# Patient Record
Sex: MALE | Race: WHITE | Employment: OTHER | URBAN - METROPOLITAN AREA
[De-identification: names, ages, dates, MRNs, and addresses within clinical notes are randomized per-mention and may not be internally consistent; named-entity substitution may affect disease eponyms.]

---

## 2022-09-15 ENCOUNTER — OFFICE VISIT (OUTPATIENT)
Dept: ORTHOPEDIC SURGERY | Age: 87
End: 2022-09-15
Payer: MEDICARE

## 2022-09-15 VITALS — HEIGHT: 68 IN | WEIGHT: 182 LBS | BODY MASS INDEX: 27.58 KG/M2

## 2022-09-15 DIAGNOSIS — M25.561 RIGHT KNEE PAIN, UNSPECIFIED CHRONICITY: Primary | ICD-10-CM

## 2022-09-15 DIAGNOSIS — M25.551 RIGHT HIP PAIN: ICD-10-CM

## 2022-09-15 PROCEDURE — 99204 OFFICE O/P NEW MOD 45 MIN: CPT | Performed by: ORTHOPAEDIC SURGERY

## 2022-09-15 PROCEDURE — G8428 CUR MEDS NOT DOCUMENT: HCPCS | Performed by: ORTHOPAEDIC SURGERY

## 2022-09-15 PROCEDURE — G8417 CALC BMI ABV UP PARAM F/U: HCPCS | Performed by: ORTHOPAEDIC SURGERY

## 2022-09-15 PROCEDURE — 4004F PT TOBACCO SCREEN RCVD TLK: CPT | Performed by: ORTHOPAEDIC SURGERY

## 2022-09-15 PROCEDURE — 1123F ACP DISCUSS/DSCN MKR DOCD: CPT | Performed by: ORTHOPAEDIC SURGERY

## 2022-09-15 NOTE — PROGRESS NOTES
Patient ID:    Leny Russell  [id number]  43 y.o.  1934    Today: September 15, 2022          Chief Complaint: Right hip pain        Patient reports longstanding history of right hip pain. The pain is predominately localized to the anterior groin and is described as a  general ache with occasional sharp pain. They rate the pain ranging from 3-8 on the pain scale occurring in a cyclical fashion with periods of acute exacerbation. Symptoms are exacerbated with getting into and out of their vehicle, crossing their legs, and attempting to put on socks/shoes. No significant pain noted with laying on the affected hip. The patient's hip pain cause moderate to severe limitations in the activities of rising from bed, putting on socks/shoes, rising from a sitting position, bending towards the floor and kneeling, twisting and pivoting on the limb and squatting. In addition, at times the patient reports extreme difficulty with these activities. No numbness of tingling going down the extremity. Patient has attempted prior conservative treatment including medications, activity modification, strengthening exercise, weight loss (if applicable), +/-hip injections consisting or either trochanteric bursitis injections or intra-articular injections.         Past Medical History:  Past Medical History:   Diagnosis Date    Arthritis     mild    Arthropathy, unspecified, site unspecified 7/8/2015    Back pain     Chronic renal insufficiency     Diverticulosis of large intestine without diverticulitis 2016    Essential hypertension, benign 7/8/2015    Full dentures     GERD (gastroesophageal reflux disease)     occ    Hand pain, left     Hip pain, right     Hx of colonic polyps 2016    adenoma    Osteoarthritis of finger of left hand     Other abnormal glucose 7/8/2015    Other and unspecified hyperlipidemia 7/8/2015    Shoulder pain        Past Surgical History:  Past Surgical History:   Procedure Laterality Date    COLONOSCOPY last 3/21/16    Dorie--single 3 mm asc TA--5 year recall if healthy and doing well        Medications:     Prior to Admission medications    Medication Sig Start Date End Date Taking? Authorizing Provider   amLODIPine (NORVASC) 5 MG tablet Take 5 mg by mouth 17   Ar Automatic Reconciliation   aspirin 81 MG chewable tablet Take 81 mg by mouth    Ar Automatic Reconciliation   hydroCHLOROthiazide (MICROZIDE) 12.5 MG capsule Take 12.5 mg by mouth 17   Ar Automatic Reconciliation   losartan (COZAAR) 100 MG tablet Take 100 mg by mouth 17   Ar Automatic Reconciliation   traMADol (ULTRAM) 50 MG tablet Take 50 mg by mouth every 6 hours as needed. 10/7/16   Ar Automatic Reconciliation       Family History:     Family History   Problem Relation Age of Onset    Diabetes Daughter     Heart Disease Father        Social History:      Social History     Tobacco Use    Smoking status: Former     Packs/day: 3.00     Types: Cigarettes     Quit date: 1979     Years since quittin.7    Smokeless tobacco: Never   Substance Use Topics    Alcohol use: Yes     Alcohol/week: 6.0 standard drinks         Allergies:    Not on File       ROS:  Review of systems has been performed and reviewed. Pertinent positives and negatives pertaining to orthopaedic issues has been noted. Non-orthopaedic issues are deferred to PCP      Objective:     Vitals:   Ht 5' 8\" (1.727 m)   Wt 182 lb (82.6 kg)   BMI 27.67 kg/m²     General: Awake and alert. AAOx3. The patient ambulates with an antalgic gait. Psych: Mood appropriate  HEENT: Normocephalic, atraumatic  Neck: Supple without obvious mass  CV/Pulm: Breathing even and unlabored  Abdomen: Nondistended on gross examination  Circulation: Normal without obvious arterial or venous deficiency. Pulses palpable bilateral lower extremities. Lymphatic: No obvious lymphedema or lymphadenopathy noted. Skin: No obvious lacerations, lesions or rash noted.   Musculoskeletal: No obvious deformity or pain with active movement of the upper extremities. Neuro: No obvious deficiency or weakness noted of the upper or lower extremities    Hip Exam:   Exam of the hip reveals anterior groin pain with resisted leg raise and FADIR testing. Internal and external rotation is decreased in the hip. No evidence of arterial of venous insufficiency. DP/PT pulses appreciable. Able to plantar- and dorsi-flex the foot/ankle. Imaging:    Images obtained today or prior    XR HIP RT W OR WO PELVIS 2-3 VWS  Views Obtained: AP pelvis, lateral right hip  Indication: Right Hip Pain  Findings:  Xrays including A/P Pelvis and lateral of the hip(s) were reviewed which demonstrate severe joint space narrowing of the right hip. There is osteophyte formation around the acetabulum and femoral head. Subchondral sclerosis noted. No obvious fracture appreciated. There is no acute bony abnormality such as malignancy appreciated. Impression: Osteoarthritis of the right hip    XR Knee 3/4 View  Views: AP knee, skiers PA, lateral knee, sunrise view right knee  Clinical indication: Right Knee Pain   Findings: Evidence of mild osteoarthritic changes. The joint line appears to be fairly well maintained although there appears to be some narrowing of the space. No significant osteophyte formation noted. No advanced subchondral cyst formation noted or sclerosis appreciated. Impression:Knee Osteoarthritis    Migue Baxter MD      Assessment:   Hip Arthritis    Plan:  Differential diagnosis and treatment options have been discussed with the patient. Risks, benefits and alternatives of each were discussed and patient questions answered. At this point the patient would like to proceed with total hip replacement    Treatment:    Total Hip Replacement- The patient has failed previous conservative treatment for this condition.  The patient has pain in the hip which causes daily symptoms which affects the patient's activities of daily living and quality of life. The risks, benefits, alternatives and possible complications of right total hip arthroplasty have been discussed with the patient including but not limited to infection, bleeding, damage to nerves and blood vessels with particular attention given to risk of damage of the femoral, obturator, lateral femoral cutaneous, superior gluteal, inferior gluteal, and sciatic nerve, risk of dislocation, fracture both intra-op and post-op, limb length inequality, polyethylene wear, implant loosening, risk for continued pain, and risk for revision surgery secondary to but not limited to all of these discussed risks. Further we discussed risk of venous thrombo-embolism including deep vein thrombosis and pulmonary embolism despite being on prophylaxis. We have also previously discussed the potential of morbidity and mortality associated with, but not limited to, the actual surgical procedure, anesthesia, prior medical conditions, and/or the administration of medications perioperatively. I have made no guarantees to the patient regarding outcomes and the patient has voiced understanding of that. The patient has been given the opportunity to ask questions all of which have been answered and the patient wishes to proceed.         Signed By: Shabnam Bryson MD  September 15, 2022

## 2022-09-19 DIAGNOSIS — M16.11 PRIMARY OSTEOARTHRITIS OF RIGHT HIP: Primary | ICD-10-CM

## 2022-10-20 ENCOUNTER — PREP FOR PROCEDURE (OUTPATIENT)
Dept: ORTHOPEDIC SURGERY | Age: 87
End: 2022-10-20

## 2022-10-20 DIAGNOSIS — M16.11 PRIMARY OSTEOARTHRITIS OF RIGHT HIP: Primary | ICD-10-CM

## 2022-10-20 RX ORDER — SODIUM CHLORIDE 0.9 % (FLUSH) 0.9 %
5-40 SYRINGE (ML) INJECTION PRN
OUTPATIENT
Start: 2022-10-20

## 2022-10-20 RX ORDER — SODIUM CHLORIDE 0.9 % (FLUSH) 0.9 %
5-40 SYRINGE (ML) INJECTION EVERY 12 HOURS SCHEDULED
OUTPATIENT
Start: 2022-10-20

## 2022-10-20 RX ORDER — SODIUM CHLORIDE 9 MG/ML
INJECTION, SOLUTION INTRAVENOUS PRN
OUTPATIENT
Start: 2022-10-20

## 2022-10-25 ENCOUNTER — HOSPITAL ENCOUNTER (OUTPATIENT)
Dept: REHABILITATION | Age: 87
Discharge: HOME | End: 2022-10-28
Payer: MEDICARE

## 2022-10-25 ENCOUNTER — HOSPITAL ENCOUNTER (OUTPATIENT)
Dept: SURGERY | Age: 87
Discharge: HOME | End: 2022-10-28
Payer: MEDICARE

## 2022-10-25 VITALS
HEIGHT: 69 IN | OXYGEN SATURATION: 96 % | RESPIRATION RATE: 18 BRPM | SYSTOLIC BLOOD PRESSURE: 161 MMHG | HEART RATE: 70 BPM | TEMPERATURE: 97.8 F | DIASTOLIC BLOOD PRESSURE: 60 MMHG | WEIGHT: 178 LBS | BODY MASS INDEX: 26.36 KG/M2

## 2022-10-25 DIAGNOSIS — M16.11 PRIMARY OSTEOARTHRITIS OF RIGHT HIP: ICD-10-CM

## 2022-10-25 LAB
ALBUMIN SERPL-MCNC: 4.1 G/DL (ref 3.2–4.6)
ANION GAP SERPL CALC-SCNC: 7 MMOL/L (ref 2–11)
APTT PPP: 26.5 SEC (ref 24.5–34.2)
BACTERIA SPEC CULT: ABNORMAL
BASOPHILS # BLD: 0 K/UL (ref 0–0.2)
BASOPHILS NFR BLD: 0 % (ref 0–2)
BUN SERPL-MCNC: 14 MG/DL (ref 8–23)
CALCIUM SERPL-MCNC: 9.6 MG/DL (ref 8.3–10.4)
CHLORIDE SERPL-SCNC: 105 MMOL/L (ref 101–110)
CO2 SERPL-SCNC: 28 MMOL/L (ref 21–32)
CREAT SERPL-MCNC: 0.85 MG/DL (ref 0.8–1.5)
DIFFERENTIAL METHOD BLD: NORMAL
EKG ATRIAL RATE: 69 BPM
EKG DIAGNOSIS: NORMAL
EKG P AXIS: 32 DEGREES
EKG P-R INTERVAL: 134 MS
EKG Q-T INTERVAL: 430 MS
EKG QRS DURATION: 136 MS
EKG QTC CALCULATION (BAZETT): 460 MS
EKG R AXIS: 149 DEGREES
EKG T AXIS: 29 DEGREES
EKG VENTRICULAR RATE: 69 BPM
EOSINOPHIL # BLD: 0.1 K/UL (ref 0–0.8)
EOSINOPHIL NFR BLD: 1 % (ref 0.5–7.8)
ERYTHROCYTE [DISTWIDTH] IN BLOOD BY AUTOMATED COUNT: 12.3 % (ref 11.9–14.6)
EST. AVERAGE GLUCOSE BLD GHB EST-MCNC: 123 MG/DL
GLUCOSE SERPL-MCNC: 96 MG/DL (ref 65–100)
HBA1C MFR BLD: 5.9 % (ref 4.8–5.6)
HCT VFR BLD AUTO: 42.9 % (ref 41.1–50.3)
HGB BLD-MCNC: 14.3 G/DL (ref 13.6–17.2)
IMM GRANULOCYTES # BLD AUTO: 0 K/UL (ref 0–0.5)
IMM GRANULOCYTES NFR BLD AUTO: 0 % (ref 0–5)
INR PPP: 0.9
LYMPHOCYTES # BLD: 2.2 K/UL (ref 0.5–4.6)
LYMPHOCYTES NFR BLD: 26 % (ref 13–44)
MCH RBC QN AUTO: 28.9 PG (ref 26.1–32.9)
MCHC RBC AUTO-ENTMCNC: 33.3 G/DL (ref 31.4–35)
MCV RBC AUTO: 86.7 FL (ref 82–102)
MONOCYTES # BLD: 0.7 K/UL (ref 0.1–1.3)
MONOCYTES NFR BLD: 9 % (ref 4–12)
NEUTS SEG # BLD: 5.5 K/UL (ref 1.7–8.2)
NEUTS SEG NFR BLD: 64 % (ref 43–78)
NRBC # BLD: 0 K/UL (ref 0–0.2)
PLATELET # BLD AUTO: 208 K/UL (ref 150–450)
PMV BLD AUTO: 9.7 FL (ref 9.4–12.3)
POTASSIUM SERPL-SCNC: 3.9 MMOL/L (ref 3.5–5.1)
PROTHROMBIN TIME: 13 SEC (ref 12.6–14.3)
RBC # BLD AUTO: 4.95 M/UL (ref 4.23–5.6)
SERVICE CMNT-IMP: ABNORMAL
SODIUM SERPL-SCNC: 140 MMOL/L (ref 133–143)
WBC # BLD AUTO: 8.5 K/UL (ref 4.3–11.1)

## 2022-10-25 PROCEDURE — 82040 ASSAY OF SERUM ALBUMIN: CPT

## 2022-10-25 PROCEDURE — 94760 N-INVAS EAR/PLS OXIMETRY 1: CPT

## 2022-10-25 PROCEDURE — 93005 ELECTROCARDIOGRAM TRACING: CPT

## 2022-10-25 PROCEDURE — 87641 MR-STAPH DNA AMP PROBE: CPT

## 2022-10-25 PROCEDURE — 85610 PROTHROMBIN TIME: CPT

## 2022-10-25 PROCEDURE — 85730 THROMBOPLASTIN TIME PARTIAL: CPT

## 2022-10-25 PROCEDURE — 80048 BASIC METABOLIC PNL TOTAL CA: CPT

## 2022-10-25 PROCEDURE — 85025 COMPLETE CBC W/AUTO DIFF WBC: CPT

## 2022-10-25 PROCEDURE — 83036 HEMOGLOBIN GLYCOSYLATED A1C: CPT

## 2022-10-25 PROCEDURE — 97161 PT EVAL LOW COMPLEX 20 MIN: CPT

## 2022-10-25 PROCEDURE — 80307 DRUG TEST PRSMV CHEM ANLYZR: CPT

## 2022-10-25 RX ORDER — VALSARTAN 160 MG/1
160 TABLET ORAL DAILY
COMMUNITY
Start: 2022-09-17

## 2022-10-25 RX ORDER — M-VIT,TX,IRON,MINS/CALC/FOLIC 27MG-0.4MG
1 TABLET ORAL DAILY
Status: ON HOLD | COMMUNITY
End: 2022-11-19 | Stop reason: HOSPADM

## 2022-10-25 RX ORDER — COVID-19 ANTIGEN TEST
KIT MISCELLANEOUS
Status: ON HOLD | COMMUNITY
End: 2022-11-19 | Stop reason: HOSPADM

## 2022-10-25 ASSESSMENT — HOOS JR
SITTING: 2
LYING IN BED (TURNING OVER, MAINTAINING HIP POSITION): 3
HOOS JR RAW SCORE: 16
HOOS JR RAW SCORE: 16
BENDING TO THE FLOOR TO PICK UP OBJECT: 3
RISING FROM SITTING: 3
GOING UP OR DOWN STAIRS: 2
WALKING ON UNEVEN SURFACE: 3
HOOS JR TOTAL INTERVAL SCORE: 39.902

## 2022-10-25 ASSESSMENT — PAIN DESCRIPTION - LOCATION: LOCATION: HIP

## 2022-10-25 ASSESSMENT — PAIN DESCRIPTION - DESCRIPTORS: DESCRIPTORS: ACHING;THROBBING

## 2022-10-25 ASSESSMENT — PULMONARY FUNCTION TESTS
FEV1 (LITERS): 1.97
FEV1 (%PREDICTED): 77

## 2022-10-25 NOTE — PROGRESS NOTES
10/25/22 1202   Treatment   Treatment Type Bedside spirometry   Oxygen Therapy/Pulse Ox   O2 Therapy Room air   Heart Rate 82   SpO2 95 %   Pulse Oximeter Device Mode Intermittent   $Pulse Oximeter $Spot check (single)   Bedside Spirometry   FEV-1/Actual (Liters) 1.97 Liters   FEV-1/Predicted (Liters) 77 Liters   Initial respiratory Assessment completed with pt. Pt was interviewed and evaluated in [facility] prior to surgery. Patient ID:  Francie Jimenez  [id number]  13 y.o.  1934  Surgeon: Dr. Brandon Arguello  Date of Surgery: [email or web address]  Procedure: Total Right Hip Arthroplasty  Primary Care Physician: Garold Saint, [phone]  Specialists:     BS:clear      Pt taught proper COUGH technique  IS REVIEWED WITH PT AS WELL AS BENEFITS OF USING IS IN SEDENTARY PTS.   DIAPHRAGMATIC BREATHING EXERCISE INSTRUCTIONS GIVEN    History of smoking:   3-4 PPD FOR 20 YEARS                 Quit date:     5    Secondhand smoke:DENIES    Past procedures with Oxygen desaturation or delayed awakening:DENIES    Past Medical History:   Diagnosis Date    Arthritis     mild    Arthropathy, unspecified, site unspecified 7/8/2015    Back pain     Chronic renal insufficiency     pt denies 10/25/22    Diverticulosis of large intestine without diverticulitis 2016    Essential hypertension, benign 7/8/2015    managed with medication    Full dentures     GERD (gastroesophageal reflux disease)     hx-no medication at this time (noted 10/25/22)    Hand pain, left     Hip pain, right     Hx of colonic polyps 2016    adenoma    Osteoarthritis of finger of left hand     Other abnormal glucose 7/8/2015    Other and unspecified hyperlipidemia 7/8/2015    Shoulder pain       HX OF PNA ONCE  Respiratory history:DENIES SOB                                                                  Respiratory meds:  DENIES    FAMILY PRESENT:           WIFE   PAST SLEEP STUDY:                         DENIES  HX OF SANTA: DENIES  SANTA assessment:     DANGERS OF UNTREATED SANTA EXPLAINED TO PT.                                          SLEEPS ON SIDE         PHYSICAL EXAM   Body mass index is 26.29 kg/m².    Vitals:    10/25/22 1204   BP: (!) 161/60   Pulse: 70   Resp: 18   Temp: 97.8 °F (36.6 °C)   SpO2: 96%     Neck circumference: 41     cm    Loud snoring:                                                 SNORES SOME- WIFE SAYS HE GRUNTS  Witnessed apnea or wakening gasping or choking:        DENIES         Awakens with headaches:                                               DENIES  Morning or daytime tiredness/ sleepiness:                             TIRED  Dry mouth or sore throat in morning:            YES                                               Colón stage:  3                                   SACS score:18  Stop Bang                                CS HS  RESPIRATORY ASSESSMENT Q SHIFT   O2 PRN    ALBUTEROL  NEBULIZER Q6 PRN WHEEZING                                               Referrals:    Pt. Phone Number:

## 2022-10-25 NOTE — PERIOP NOTE
The below lab results are within anesthesia limits.       Latest Reference Range & Units 10/25/22 12:25   Sodium 133 - 143 mmol/L 140   Potassium 3.5 - 5.1 mmol/L 3.9   Chloride 101 - 110 mmol/L 105   CO2 21 - 32 mmol/L 28   BUN,BUNPL 8 - 23 MG/DL 14   Creatinine 0.8 - 1.5 MG/DL 0.85   Anion Gap 2 - 11 mmol/L 7   Est, Glom Filt Rate >60 ml/min/1.73m2 >60   Glucose, Random 65 - 100 mg/dL 96   CALCIUM, SERUM, 361346 8.3 - 10.4 MG/DL 9.6   Albumin 3.2 - 4.6 g/dL 4.1   Hemoglobin A1C 4.8 - 5.6 % 5.9 (H)   eAG (mg/dL) mg/dL 123   WBC 4.3 - 11.1 K/uL 8.5   RBC 4.23 - 5.6 M/uL 4.95   Hemoglobin Quant 13.6 - 17.2 g/dL 14.3   Hematocrit 41.1 - 50.3 % 42.9   MCV 82.0 - 102.0 FL 86.7   MCH 26.1 - 32.9 PG 28.9   MCHC 31.4 - 35.0 g/dL 33.3   MPV 9.4 - 12.3 FL 9.7   RDW 11.9 - 14.6 % 12.3   Platelet Count 071 - 450 K/uL 208   Absolute Mono # 0.1 - 1.3 K/UL 0.7   Eosinophils % 0.5 - 7.8 % 1   Basophils Absolute 0.0 - 0.2 K/UL 0.0   Differential Type -   AUTOMATED   Seg Neutrophils 43 - 78 % 64   Segs Absolute 1.7 - 8.2 K/UL 5.5   Lymphocytes 13 - 44 % 26   Absolute Lymph # 0.5 - 4.6 K/UL 2.2   Monocytes 4.0 - 12.0 % 9   Absolute Eos # 0.0 - 0.8 K/UL 0.1   Basophils 0.0 - 2.0 % 0   Immature Granulocytes 0.0 - 5.0 % 0   Nucleated Red Blood Cells 0.0 - 0.2 K/uL 0.00   Absolute Immature Granulocyte 0.0 - 0.5 K/UL 0.0   Prothrombin Time 12.6 - 14.3 sec 13.0   INR -   0.9   PTT 24.5 - 34.2 SEC 26.5   MSSA/MRSA SCREEN BY PCR  Rpt   (H): Data is abnormally high  Rpt: View report in Results Review for more information

## 2022-10-25 NOTE — PROGRESS NOTES
Escobar Paiz  : 1934  Primary: Medicare Part A And B  Secondary:  FOR [address] at [facility]  [phone]        Physical Therapy Prehab Evaluation Summary:10/25/2022   Time In/Out   PT Charge Capture  Episode     MEDICAL/REFERRING DIAGNOSIS: Unilateral primary osteoarthritis, right hip [M16.11]  REFERRING PHYSICIAN: Ana Moseley MD    ICD-10: Treatment Diagnosis:   Pain in Right Hip (M25.551)  Stiffness of Right Hip, Not elsewhere classified (M25.651)  Difficulty in walking, Not elsewhere classified (R26.2)    DATE OF SURGERY: 22  Assessment:   COMMENTS:  Pt. Plans to go home with wife who is here today. 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 He is using one loftstrand crutch. He did report an occasional fall. PROBLEM LIST:   (Impacting functional limitations):  Mr. Abebe Felipe presents with the following lower extremity(s) problems:  Strength  Range of Motion  Home Exercise Program  Pain INTERVENTIONS PLANNED:   (Benefits and precautions of physical therapy have been discussed with the patient.)  Home Exercise Program  Educational Discussion       GOALS: (Goals have been discussed and agreed upon with patient.)  Discharge Goals: Time Frame: 1 Day  Patient will demonstrate independence with a home exercise program designed to increase strength, range of motion, and pain control to minimize functional deficits and optimize patient for total joint replacement.     Subjective:   Past Medical History/Comorbidities:   Mr. Abebe Felipe  has a past medical history of Arthritis, Arthropathy, unspecified, site unspecified, Back pain, Chronic renal insufficiency, Diverticulosis of large intestine without diverticulitis, Essential hypertension, benign, Full dentures, GERD (gastroesophageal reflux disease), Hand pain, left, Hip pain, right, Hx of colonic polyps, Osteoarthritis of finger of left hand, Other abnormal glucose, Other and unspecified hyperlipidemia, and Shoulder pain. Mr. Ronald Worthy  has a past surgical history that includes Colonoscopy (last 3/21/16). Allergies:  Patient has no known allergies.     Current Medications:  Refer to pre-assessment nursing note    Home Set-Up/Prior Level of Function:  Lives With: Spouse  Type of Home: House  Home Layout: One level  Home Access: Stairs to enter with rails  Entrance Stairs - Number of Steps: 3  Bathroom Shower/Tub: Walk-in shower  Bathroom Equipment: Shower chair, Toilet raiser  Home Equipment: Cane, Crutches, Walker, rolling (loftstrand)    Dominant Side:  Dominant Hand: : Right    Current Functional Status:   Ambulation:  [x] Independent  [] Walk Indoors Only  [] Walk Outdoors  [] Use Assistive Device  [] Use Wheelchair Only Dressing:  [x] [address] from Someone for:  [] Sock/Shoes  [] Pants  [] Everything   Bathing/Showering:   [x] Independent  [] Requires Assistance from Someone  [] [address]:  [x] Routine house and yard work  [] Light Housework Only  [] None     Objective:   PAIN:   Pre-Treatment Pain  Pain Assessment: 0-10  Pain Level:  (7 at worst)  Pain Location: Hip  Pain Descriptors: Aching, Throbbing    Post Treatment: hip pain    Outcome Measure:   HOOS-JR:  Total Raw Score (0-24 Scale): 16    KOOS-JR:       LOWER EXTREMITY GROSS EVALUATION:  RIGHT LE   Within Functional Limits   Abnormal   Comments   Strength [] []  Hip <3/5   Range of Motion [] [] AROM RLE (degrees)  RLE AROM: Exceptions  R Hip Flexion 0-125: 90  R Hip ABduction 0-45: 10  R Knee Extension 0: -5      LEFT LE Within Functional Limits   Abnormal   Comments   Strength [x] []     Range of Motion [x] []       UPPER EXTREMITY GROSS EVALUATION:     Within Functional Limits   Abnormal   Comments   Strength [] []    Range of Motion [] []      SENSATION  Sensation [x] COGNITION/  PERCEPTION: Intact Impaired (Comments):   Orientation [x]     Vision [x]     Hearing [x]     Cognition  [x]       TRANSFERS: I Mod I S SBA CGA Min Mod Max Total  NT x2 Comments:   Sit to Stand [] [x] [] [] [] [] [] [] [] [] []    Stand to Sit [] [x] [] [] [] [] [] [] [] [] []    Stand pivot [] [] [] [] [] [] [] [] [] [] []     [] [] [] [] [] [] [] [] [] [] []    I=Independent, Mod I=Modified Independent, S=Supervision, SBA=Standby Assistance, CGA=Contact Guard Assistance,   Min=Minimal Assistance, Mod=Moderate Assistance, Max=Maximal Assistance, Total=Total Assistance, NT=Not Tested    BALANCE: Good Fair+ Fair Fair- Poor NT Comments   Sitting Static [x] [] [] [] [] []    Sitting Dynamic [x] [] [] [] [] []              Standing Static [] [x] [] [] [] [] With crutch   Standing Dynamic [] [x] [] [] [] [] With crutch     Postural Assessment:   Rounded Shoulders  Trunk Flexion    GAIT: I Mod I S SBA CGA Min Mod Max Total  NT x2 Comments:   Level of Assistance [] [x] [] [] [] [] [] [] [] [] []    Weightbearing Status       Distance  200 feet    Gait Quality Decreased jesus , Decreased step length, and Decreased stance    DME One loftstrand crutch      Stairs      Ramp     I=Independent, Mod I=Modified Independent, S=Supervision, SBA=Standby Assistance, CGA=Contact Guard Assistance,   Min=Minimal Assistance, Mod=Moderate Assistance, Max=Maximal Assistance, Total=Total Assistance, NT=Not Tested    TREATMENT:   EVALUATION: LOW COMPLEXITY: (Untimed Charge)    TREATMENT PLAN:   Effective Dates: 10/25/2022 TO 10/25/2022. Treatment/Session Assessment:  Patient was instructed in PT- HEP to increase strength and ROM in LEs. Answered all questions. Frequency/Duration: Patient to continue to perform home exercise program at least twice per day up until his surgery.     EDUCATION: Education Given To: Patient, Family  Education Provided: Role of Therapy, Home Exercise Program  Education Method: Verbal, Demonstration  Education Outcome: Verbalized understanding    MEDICAL NECESSITY: Mr. Donne Spurling is expected to optimize hislower extremity strength and ROM in preparation for joint replacement surgery. REASON FOR CONTINUED SERVICES: Achieve baseline assesment of musculoskeletal system, functional mobility and home environment. , educate in PT HEP in preparation for surgery, educate in hospital plan of care. COMPLIANCE WITH PROGRAM/EXERCISE: compliant most of the time. TOTAL TREATMENT DURATION:  Time In: 1300  Time Out: 1310  Minutes: 10    Regarding Pradip Brady's therapy, I certify that the treatment plan above will be carried out by a therapist or under their direction.   Thank you for this referral,  Felton Arana, PT

## 2022-10-25 NOTE — PERIOP NOTE
Patient verified name and . Order for consent found in EHR and matches case posting; patient verified. Type 3 surgery, joint assessment complete. Labs per surgeon: CBC,BMP, PT/PTT, Albumin, HgbA1c, Nicotine ; results pending. Labs per anesthesia protocol: no additional  EKG: Completed today; results to be reviewed by anesthesia. Charge nurse to f/u. Previous EKG and most recent office note requested from Dr. Alfie Gallegos office for anesthesia reference. EKG results will be reviewed by anesthesia after above records have been received. MRSA/MSSA swab collected; pharmacy to review and dose antibiotic as appropriate. Hospital approved surgical skin cleanser and instructions to return bottle on DOS given per hospital policy. Patient provided with handouts including Guide to Surgery, Pain Management, Hand Hygiene, Blood Transfusion Education, and [facility] Brochure. Patient answered medical/surgical history questions at their best of ability. All prior to admission medications documented in [facility]. Original medication prescription bottle not visualized during patient appointment. Patient instructed to hold all vitamins, supplements, herbals 3 weeks prior to surgery and NSAIDS 5 days prior to surgery. Patient teach back successful and patient demonstrates knowledge of instruction.

## 2022-10-25 NOTE — PERIOP NOTE
PLEASE CONTINUE TAKING ALL PRESCRIPTION MEDICATIONS UP TO THE DAY OF SURGERY UNLESS OTHERWISE DIRECTED BELOW. DISCONTINUE all vitamins, herbals and supplements 21 days prior to surgery. DISCONTINUE Non-Steriodal Anti-Inflammatory (NSAIDS) such as Advil, Ibuprofen, and Aleve 5 days prior to surgery. Home Medications to HOLD       All vitamins, supplements, and herbals stop 21 days prior to surgery. All NSAIDs such as Advil, Aleve, Ibuprofen, Diclofenac, Naproxen, etc. Stop 5 days prior to surgery. *IT IS OK TO TAKE TYLENOL*      Home Medications to take  the day of surgery   Amlodipine           Comments   *The day before surgery, 11/17/22, take Acetaminophen (Tylenol) 1000mg in the morning and again at bedtime. Can substitute 650mg Tylenol*   BRING: incentive spirometer, bottle of tyrel-hex soap             Please do not bring home medications with you on the day of surgery unless otherwise directed by your nurse. If you are instructed to bring home medications, please give them to your nurse as they will be administered by the nursing staff. If you have any questions, please call [address] [place] [address] [phone] or [address] [place] [address] [phone].

## 2022-10-25 NOTE — CARE COORDINATION
[facility] Case Management note:  Patient screened in Prehab for discharge planning needs. Patient scheduled for a future total joint replacement. We discussed Home Health and equipment needed after surgery. List of Home Health providers offered. Patient w/o preference towards provider. We will arrange [facility] on the day of surgery. Has a walker but will need a 3-1 BSC. His wife will be his caregiver.

## 2022-10-26 LAB
COMMENT:: NORMAL
COTININE UR QL SCN: NEGATIVE NG/ML

## 2022-10-26 NOTE — PROGRESS NOTES
Records received from PCP office, no EKG found per office. Scanned into EHR if needed for reference.

## 2022-10-27 NOTE — PROGRESS NOTES
Nicotine Metabolites, Urine  Order: [id number]  Status: Final result    Visible to patient: No (not released)    Next appt: 11/10/2022 at 01:40 PM in Orthopedic Surgery Hong Jackson MD)    Dx: Primary osteoarthritis of right hip    0 Result Notes  Component Ref Range & Units 10/25/22 1225  Resulting Agency   Cotinine Screen, Ur Qgjvrz=756 ng/mL Negative  [facility]   Comment:   Comment  [facility]   Comment: (NOTE)   This analysis is performed by immunoassay. Positive findings are   unconfirmed analytical test results; if results do not support   expected clinical finding, confirmation by an alternate methodology   is recommended. Patient metabolic variables, specific drug chemistry,   and specimen characteristics can affect test outcome. Technical consultation is available at [email or web address], or   call toll free [phone].    Performed At: [facility] & [address] [id number]   Sahil Hamilton MD OK:[id number]   Performed At: [facility]   [address] [id number]   Francisco Hutchison PhD BB:[id number]               Specimen Collected: 10/25/22 12:25 EDT Last Resulted: 10/26/22 18:36 EDT

## 2022-10-31 NOTE — PROGRESS NOTES
Total Joint Surgery Preoperative Chart Review      Patient ID:  Sheeba Mcmahon  [id number]  73 y.o.  1934  Surgeon: Dr. Yordan Jimenez  Date of Surgery: 2022  Procedure: Total Right Hip Arthroplasty  Primary Care Physician: Marky Plunkett [phone]  Specialty Physician(s):      Subjective:   Sheeba Mcmahon is a 80 y.o. White (non-) male who presents for preoperative evaluation for Total Right Hip arthroplasty. This is a preoperative chart review note based on data collected by the nurse at the surgical Pre-Assessment visit. Past Medical History:   Diagnosis Date    Arthritis     mild    Arthropathy, unspecified, site unspecified 2015    Back pain     Chronic renal insufficiency     pt denies 10/25/22    Diverticulosis of large intestine without diverticulitis 2016    Essential hypertension, benign 2015    managed with medication    Full dentures     GERD (gastroesophageal reflux disease)     hx-no medication at this time (noted 10/25/22)    Hand pain, left     Hip pain, right     Hx of colonic polyps     adenoma    Osteoarthritis of finger of left hand     Other abnormal glucose 2015    Other and unspecified hyperlipidemia 2015    Shoulder pain       Past Surgical History:   Procedure Laterality Date    COLONOSCOPY  last 3/21/16    Dorie--single 3 mm asc TA--5 year recall if healthy and doing well     Family History   Problem Relation Age of Onset    Diabetes Daughter     Heart Disease Father       Social History     Tobacco Use    Smoking status: Former     Packs/day: 3.00     Types: Cigarettes     Quit date: 1979     Years since quittin.8    Smokeless tobacco: Never   Substance Use Topics    Alcohol use: Yes     Alcohol/week: 6.0 standard drinks     Comment: social       Prior to Admission medications    Medication Sig Start Date End Date Taking?  Authorizing Provider   Naproxen Sodium (ALEVE) 220 MG CAPS Take by mouth   Yes Historical Provider, MD   Multiple Vitamins-Minerals (THERAPEUTIC MULTIVITAMIN-MINERALS) tablet Take 1 tablet by mouth daily   Yes Historical Provider, MD   valsartan (DIOVAN) 160 MG tablet Take 160 mg by mouth daily 9/17/22   Historical Provider, MD   amLODIPine (NORVASC) 5 MG tablet Take 5 mg by mouth 4/12/17   Ar Automatic Reconciliation     No Known Allergies       Objective:     Physical Exam:   No data found. ECG:    No results found for this or any previous visit (from the past 4464 hour(s)). Data Review:   Labs:   Labs reviewed    Problem List:  )  Patient Active Problem List   Diagnosis    Sciatica    Osteoarthritis of finger of left hand    Arthritis    Abnormal glucose    Benign hypertension    Chronic renal insufficiency    Hyperlipidemia    Other abnormal glucose    Primary osteoarthritis of right hip       Total Joint Surgery Pre-Assessment Recommendations:           Continuous saturation monitoring during hospitalization. O2 prn per respiratory protocol.      Signed By: CALE Beaver - CNP-C    October 31, 2022

## 2022-11-07 DIAGNOSIS — G89.18 POSTOPERATIVE PAIN: Primary | ICD-10-CM

## 2022-11-10 ENCOUNTER — OFFICE VISIT (OUTPATIENT)
Dept: ORTHOPEDIC SURGERY | Age: 87
End: 2022-11-10

## 2022-11-10 DIAGNOSIS — M16.11 PRIMARY OSTEOARTHRITIS OF RIGHT HIP: Primary | ICD-10-CM

## 2022-11-10 NOTE — PROGRESS NOTES
Patient ID:  Alex Claire  [id number]  5 y.o.  1934    Today: November 10, 2022       CC:  Right hip pain    HPI:   The patient has end stage arthritis of the right hip. The patient was evaluated and examined in the office prior to today and was found to have a history on physical exam consistent with intra-articular pathology of the right hip. Patient complains of anterior groin pain predominately. Pain occurs during activity. Patient has difficulty putting on socks/shoes and has notable pain when getting up from a chair and getting out of a car. Patient does not complain of significant lateral hip pain or radicular pain down the leg. There have been no changes to the patient's orthopedic condition since the last office visit    Past Medical History:  Past Medical History:   Diagnosis Date    Arthritis     mild    Arthropathy, unspecified, site unspecified 7/8/2015    Back pain     Chronic renal insufficiency     pt denies 10/25/22    Diverticulosis of large intestine without diverticulitis 2016    Essential hypertension, benign 7/8/2015    managed with medication    Full dentures     GERD (gastroesophageal reflux disease)     hx-no medication at this time (noted 10/25/22)    Hand pain, left     Hip pain, right     Hx of colonic polyps 2016    adenoma    Osteoarthritis of finger of left hand     Other abnormal glucose 7/8/2015    Other and unspecified hyperlipidemia 7/8/2015    Shoulder pain        Past Surgical History:  Past Surgical History:   Procedure Laterality Date    COLONOSCOPY  last 3/21/16    Dorie--single 3 mm asc TA--5 year recall if healthy and doing well        Medications:     Prior to Admission medications    Medication Sig Start Date End Date Taking?  Authorizing Provider   valsartan (DIOVAN) 160 MG tablet Take 160 mg by mouth daily 9/17/22   Historical Provider, MD   Naproxen Sodium (ALEVE) 220 MG CAPS Take by mouth    Historical Provider, MD   Multiple Vitamins-Minerals (THERAPEUTIC MULTIVITAMIN-MINERALS) tablet Take 1 tablet by mouth daily    Historical Provider, MD   amLODIPine (NORVASC) 5 MG tablet Take 5 mg by mouth 17   Ar Automatic Reconciliation       Family History:     Family History   Problem Relation Age of Onset    Diabetes Daughter     Heart Disease Father        Social History:      Social History     Tobacco Use    Smoking status: Former     Packs/day: 3.00     Types: Cigarettes     Quit date: 1979     Years since quittin.8    Smokeless tobacco: Never   Substance Use Topics    Alcohol use: Yes     Alcohol/week: 6.0 standard drinks     Comment: social         Allergies:    No Known Allergies     Vitals: There were no vitals taken for this visit. Objective:         General: No Acute distress                   HEENT: Normocephalic/atramatic                   Lungs:  Breathing non-labored                   Heart:   RRR                    Abdomen: soft       Extremities:  Prior exam done in office has been consistent with end-stage hip arthritis. We have noted pain with ROM of the right hip which manifests as anterior groin pain. There is decreased internal and external rotation of the affected hip. No significant pain with palpation over the greater trochanteric bursa. No radicular pain with straight leg raise. Patient walks with and antalgic gait. Distally patient has no neurologic deficit. Patient Active Problem List   Diagnosis    Sciatica    Osteoarthritis of finger of left hand    Arthritis    Abnormal glucose    Benign hypertension    Chronic renal insufficiency    Hyperlipidemia    Other abnormal glucose    Primary osteoarthritis of right hip       Assessment:    Arthritis of the Right hip    Plan:  The patient has failed previous conservative treatment for this condition. The patient has pain in the right hip which causes daily symptoms which affects the patient's activities of daily living and quality of life.  The risks, benefits, alternatives and possible complications of right total hip arthroplasty have been discussed with the patient including but not limited to infection, bleeding, damage to nerves and blood vessels with particular attention given to risk of damage of the femoral, obturator, lateral femoral cutaneous, superior gluteal, inferior gluteal, and sciatic nerve, risk of dislocation, fracture both intra-op and post-op, limb length inequality, polyethylene wear, implant loosening, risk for continued pain, and risk for revision surgery secondary to but not limited to all of these discussed risks. Further we discussed risk of venous thrombo-embolism including deep vein thrombosis and pulmonary embolism despite being on prophylaxis. We have also previously discussed the potential of morbidity and mortality associated with, but not limited to, the actual surgical procedure, anesthesia, prior medical conditions, and/or the administration of medications perioperatively. I have made no guarantees to the patient regarding outcomes and the patient has voiced understanding of that. The patient has been given the opportunity to ask questions all of which have been answered and the patient wishes to proceed. The patient will be admitted the day of surgery for right total hip replacement.           Signed By: CALE Terry - FREEDOM  November 10, 2022

## 2022-11-11 RX ORDER — ASPIRIN 81 MG/1
81 TABLET ORAL 2 TIMES DAILY
Qty: 60 TABLET | Refills: 0 | Status: SHIPPED | OUTPATIENT
Start: 2022-11-11

## 2022-11-11 RX ORDER — ACETAMINOPHEN 500 MG
1000 TABLET ORAL EVERY 6 HOURS PRN
Qty: 180 TABLET | Refills: 2 | Status: SHIPPED | OUTPATIENT
Start: 2022-11-11

## 2022-11-11 RX ORDER — OMEPRAZOLE 40 MG/1
40 CAPSULE, DELAYED RELEASE ORAL DAILY
Qty: 30 CAPSULE | Refills: 0 | Status: SHIPPED | OUTPATIENT
Start: 2022-11-11

## 2022-11-11 RX ORDER — SENNA PLUS 8.6 MG/1
1 TABLET ORAL 2 TIMES DAILY
Qty: 30 TABLET | Refills: 2 | Status: SHIPPED | OUTPATIENT
Start: 2022-11-11

## 2022-11-11 RX ORDER — OMEPRAZOLE 40 MG/1
40 CAPSULE, DELAYED RELEASE ORAL DAILY
Qty: 90 CAPSULE | OUTPATIENT
Start: 2022-11-11

## 2022-11-11 RX ORDER — OXYCODONE HYDROCHLORIDE 10 MG/1
10 TABLET ORAL EVERY 4 HOURS PRN
Qty: 40 TABLET | Refills: 0 | Status: ON HOLD | OUTPATIENT
Start: 2022-11-11 | End: 2022-11-19 | Stop reason: HOSPADM

## 2022-11-11 RX ORDER — ONDANSETRON 4 MG/1
4 TABLET, FILM COATED ORAL 3 TIMES DAILY PRN
Qty: 30 TABLET | Refills: 1 | Status: ON HOLD | OUTPATIENT
Start: 2022-11-11 | End: 2022-11-19 | Stop reason: HOSPADM

## 2022-11-17 ENCOUNTER — ANESTHESIA EVENT (OUTPATIENT)
Dept: SURGERY | Age: 87
End: 2022-11-17
Payer: MEDICARE

## 2022-11-17 RX ORDER — ACETAMINOPHEN 500 MG
1000 TABLET ORAL ONCE
Status: CANCELLED | OUTPATIENT
Start: 2022-11-17 | End: 2022-11-17

## 2022-11-18 ENCOUNTER — HOSPITAL ENCOUNTER (OUTPATIENT)
Age: 87
Discharge: HOME | End: 2022-11-19
Attending: ORTHOPAEDIC SURGERY | Admitting: ORTHOPAEDIC SURGERY
Payer: MEDICARE

## 2022-11-18 ENCOUNTER — APPOINTMENT (OUTPATIENT)
Dept: GENERAL RADIOLOGY | Age: 87
End: 2022-11-18
Attending: ORTHOPAEDIC SURGERY
Payer: MEDICARE

## 2022-11-18 ENCOUNTER — HOME HEALTH ADMISSION (OUTPATIENT)
Dept: HOME HEALTH SERVICES | Facility: HOME HEALTH | Age: 87
End: 2022-11-18
Payer: MEDICARE

## 2022-11-18 ENCOUNTER — ANESTHESIA (OUTPATIENT)
Dept: SURGERY | Age: 87
End: 2022-11-18
Payer: MEDICARE

## 2022-11-18 DIAGNOSIS — G89.18 POSTOPERATIVE PAIN: ICD-10-CM

## 2022-11-18 DIAGNOSIS — M16.11 PRIMARY OSTEOARTHRITIS OF RIGHT HIP: ICD-10-CM

## 2022-11-18 PROCEDURE — 6360000002 HC RX W HCPCS: Performed by: NURSE PRACTITIONER

## 2022-11-18 PROCEDURE — 2500000003 HC RX 250 WO HCPCS: Performed by: NURSE PRACTITIONER

## 2022-11-18 PROCEDURE — 2500000003 HC RX 250 WO HCPCS: Performed by: NURSE ANESTHETIST, CERTIFIED REGISTERED

## 2022-11-18 PROCEDURE — 94761 N-INVAS EAR/PLS OXIMETRY MLT: CPT

## 2022-11-18 PROCEDURE — 7100000001 HC PACU RECOVERY - ADDTL 15 MIN: Performed by: ORTHOPAEDIC SURGERY

## 2022-11-18 PROCEDURE — 2580000003 HC RX 258: Performed by: ANESTHESIOLOGY

## 2022-11-18 PROCEDURE — C1776 JOINT DEVICE (IMPLANTABLE): HCPCS | Performed by: ORTHOPAEDIC SURGERY

## 2022-11-18 PROCEDURE — 3700000000 HC ANESTHESIA ATTENDED CARE: Performed by: ORTHOPAEDIC SURGERY

## 2022-11-18 PROCEDURE — 6360000002 HC RX W HCPCS: Performed by: ANESTHESIOLOGY

## 2022-11-18 PROCEDURE — 2709999900 HC NON-CHARGEABLE SUPPLY: Performed by: ORTHOPAEDIC SURGERY

## 2022-11-18 PROCEDURE — 6360000002 HC RX W HCPCS: Performed by: ORTHOPAEDIC SURGERY

## 2022-11-18 PROCEDURE — C1713 ANCHOR/SCREW BN/BN,TIS/BN: HCPCS | Performed by: ORTHOPAEDIC SURGERY

## 2022-11-18 PROCEDURE — 97530 THERAPEUTIC ACTIVITIES: CPT

## 2022-11-18 PROCEDURE — 3600000015 HC SURGERY LEVEL 5 ADDTL 15MIN: Performed by: ORTHOPAEDIC SURGERY

## 2022-11-18 PROCEDURE — 27130 TOTAL HIP ARTHROPLASTY: CPT | Performed by: NURSE PRACTITIONER

## 2022-11-18 PROCEDURE — 6360000002 HC RX W HCPCS: Performed by: NURSE ANESTHETIST, CERTIFIED REGISTERED

## 2022-11-18 PROCEDURE — 6370000000 HC RX 637 (ALT 250 FOR IP): Performed by: ANESTHESIOLOGY

## 2022-11-18 PROCEDURE — 7100000000 HC PACU RECOVERY - FIRST 15 MIN: Performed by: ORTHOPAEDIC SURGERY

## 2022-11-18 PROCEDURE — 27130 TOTAL HIP ARTHROPLASTY: CPT | Performed by: ORTHOPAEDIC SURGERY

## 2022-11-18 PROCEDURE — 3600000005 HC SURGERY LEVEL 5 BASE: Performed by: ORTHOPAEDIC SURGERY

## 2022-11-18 PROCEDURE — 2720000010 HC SURG SUPPLY STERILE: Performed by: ORTHOPAEDIC SURGERY

## 2022-11-18 PROCEDURE — 97535 SELF CARE MNGMENT TRAINING: CPT

## 2022-11-18 PROCEDURE — 6370000000 HC RX 637 (ALT 250 FOR IP): Performed by: NURSE PRACTITIONER

## 2022-11-18 PROCEDURE — 97165 OT EVAL LOW COMPLEX 30 MIN: CPT

## 2022-11-18 PROCEDURE — 94760 N-INVAS EAR/PLS OXIMETRY 1: CPT

## 2022-11-18 PROCEDURE — 72170 X-RAY EXAM OF PELVIS: CPT

## 2022-11-18 PROCEDURE — 97161 PT EVAL LOW COMPLEX 20 MIN: CPT

## 2022-11-18 PROCEDURE — 3700000001 HC ADD 15 MINUTES (ANESTHESIA): Performed by: ORTHOPAEDIC SURGERY

## 2022-11-18 DEVICE — STEM FEM SZ 4 L105MM NK L35MM 42MM OFFSET 127DEG HIP TI: Type: IMPLANTABLE DEVICE | Site: HIP | Status: FUNCTIONAL

## 2022-11-18 DEVICE — INSERT ACET F 10 DEG 36 MM HIP X3 TRIDENT: Type: IMPLANTABLE DEVICE | Site: HIP | Status: FUNCTIONAL

## 2022-11-18 DEVICE — SHELL ACET SZ F DIA58MM 5 CLUS H TRITANIUM PRESSFIT PRI: Type: IMPLANTABLE DEVICE | Site: HIP | Status: FUNCTIONAL

## 2022-11-18 DEVICE — HEAD FEM DIA36MM +0MM OFFSET HIP BIOLOX DELT CERAMIC TAPR: Type: IMPLANTABLE DEVICE | Site: HIP | Status: FUNCTIONAL

## 2022-11-18 DEVICE — COMPONENT TOT HIP CAPPED LNR POLYETH H2STRYKER] STRYKER CORP]: Type: IMPLANTABLE DEVICE | Site: HIP | Status: FUNCTIONAL

## 2022-11-18 DEVICE — SCREW BNE L35MM DIA65MM LO PROF HEX TRIDENT LL: Type: IMPLANTABLE DEVICE | Site: HIP | Status: FUNCTIONAL

## 2022-11-18 RX ORDER — ONDANSETRON 4 MG/1
4 TABLET, ORALLY DISINTEGRATING ORAL EVERY 8 HOURS PRN
Status: DISCONTINUED | OUTPATIENT
Start: 2022-11-18 | End: 2022-11-19 | Stop reason: HOSPADM

## 2022-11-18 RX ORDER — OXYCODONE HYDROCHLORIDE 5 MG/1
10 TABLET ORAL PRN
Status: COMPLETED | OUTPATIENT
Start: 2022-11-18 | End: 2022-11-18

## 2022-11-18 RX ORDER — EPHEDRINE SULFATE/0.9% NACL/PF 50 MG/5 ML
SYRINGE (ML) INTRAVENOUS PRN
Status: DISCONTINUED | OUTPATIENT
Start: 2022-11-18 | End: 2022-11-18 | Stop reason: SDUPTHER

## 2022-11-18 RX ORDER — ACETAMINOPHEN 500 MG
1000 TABLET ORAL EVERY 6 HOURS
Status: DISCONTINUED | OUTPATIENT
Start: 2022-11-18 | End: 2022-11-19 | Stop reason: HOSPADM

## 2022-11-18 RX ORDER — SODIUM CHLORIDE 0.9 % (FLUSH) 0.9 %
5-40 SYRINGE (ML) INJECTION PRN
Status: DISCONTINUED | OUTPATIENT
Start: 2022-11-18 | End: 2022-11-18 | Stop reason: HOSPADM

## 2022-11-18 RX ORDER — PROPOFOL 10 MG/ML
INJECTION, EMULSION INTRAVENOUS CONTINUOUS PRN
Status: DISCONTINUED | OUTPATIENT
Start: 2022-11-18 | End: 2022-11-18 | Stop reason: SDUPTHER

## 2022-11-18 RX ORDER — NALOXONE HYDROCHLORIDE 0.4 MG/ML
0.4 INJECTION, SOLUTION INTRAMUSCULAR; INTRAVENOUS; SUBCUTANEOUS PRN
Status: DISCONTINUED | OUTPATIENT
Start: 2022-11-18 | End: 2022-11-19 | Stop reason: HOSPADM

## 2022-11-18 RX ORDER — HYDROMORPHONE HYDROCHLORIDE 1 MG/ML
0.5 INJECTION, SOLUTION INTRAMUSCULAR; INTRAVENOUS; SUBCUTANEOUS EVERY 5 MIN PRN
Status: DISCONTINUED | OUTPATIENT
Start: 2022-11-18 | End: 2022-11-18 | Stop reason: HOSPADM

## 2022-11-18 RX ORDER — DIPHENHYDRAMINE HYDROCHLORIDE 50 MG/ML
12.5 INJECTION INTRAMUSCULAR; INTRAVENOUS
Status: DISCONTINUED | OUTPATIENT
Start: 2022-11-18 | End: 2022-11-18 | Stop reason: HOSPADM

## 2022-11-18 RX ORDER — SODIUM CHLORIDE, SODIUM LACTATE, POTASSIUM CHLORIDE, CALCIUM CHLORIDE 600; 310; 30; 20 MG/100ML; MG/100ML; MG/100ML; MG/100ML
INJECTION, SOLUTION INTRAVENOUS CONTINUOUS
Status: DISCONTINUED | OUTPATIENT
Start: 2022-11-18 | End: 2022-11-18 | Stop reason: HOSPADM

## 2022-11-18 RX ORDER — AMLODIPINE BESYLATE 5 MG/1
5 TABLET ORAL DAILY
Status: DISCONTINUED | OUTPATIENT
Start: 2022-11-19 | End: 2022-11-19 | Stop reason: HOSPADM

## 2022-11-18 RX ORDER — SODIUM CHLORIDE 0.9 % (FLUSH) 0.9 %
5-40 SYRINGE (ML) INJECTION PRN
Status: DISCONTINUED | OUTPATIENT
Start: 2022-11-18 | End: 2022-11-19 | Stop reason: HOSPADM

## 2022-11-18 RX ORDER — ONDANSETRON 2 MG/ML
INJECTION INTRAMUSCULAR; INTRAVENOUS PRN
Status: DISCONTINUED | OUTPATIENT
Start: 2022-11-18 | End: 2022-11-18 | Stop reason: SDUPTHER

## 2022-11-18 RX ORDER — KETOROLAC TROMETHAMINE 30 MG/ML
15 INJECTION, SOLUTION INTRAMUSCULAR; INTRAVENOUS EVERY 8 HOURS
Status: DISCONTINUED | OUTPATIENT
Start: 2022-11-18 | End: 2022-11-19 | Stop reason: HOSPADM

## 2022-11-18 RX ORDER — MIDAZOLAM HYDROCHLORIDE 2 MG/2ML
2 INJECTION, SOLUTION INTRAMUSCULAR; INTRAVENOUS
Status: DISCONTINUED | OUTPATIENT
Start: 2022-11-18 | End: 2022-11-18 | Stop reason: HOSPADM

## 2022-11-18 RX ORDER — SODIUM CHLORIDE 0.9 % (FLUSH) 0.9 %
5-40 SYRINGE (ML) INJECTION PRN
Status: DISCONTINUED | OUTPATIENT
Start: 2022-11-18 | End: 2022-11-18 | Stop reason: SDUPTHER

## 2022-11-18 RX ORDER — OXYCODONE HYDROCHLORIDE 5 MG/1
5 TABLET ORAL PRN
Status: COMPLETED | OUTPATIENT
Start: 2022-11-18 | End: 2022-11-18

## 2022-11-18 RX ORDER — CELECOXIB 100 MG/1
100 CAPSULE ORAL ONCE
Status: COMPLETED | OUTPATIENT
Start: 2022-11-18 | End: 2022-11-18

## 2022-11-18 RX ORDER — TRANEXAMIC ACID 100 MG/ML
INJECTION, SOLUTION INTRAVENOUS PRN
Status: DISCONTINUED | OUTPATIENT
Start: 2022-11-18 | End: 2022-11-18 | Stop reason: SDUPTHER

## 2022-11-18 RX ORDER — DEXAMETHASONE SODIUM PHOSPHATE 10 MG/ML
10 INJECTION INTRAMUSCULAR; INTRAVENOUS ONCE
Status: DISCONTINUED | OUTPATIENT
Start: 2022-11-19 | End: 2022-11-19 | Stop reason: HOSPADM

## 2022-11-18 RX ORDER — DIPHENHYDRAMINE HCL 25 MG
25 CAPSULE ORAL EVERY 6 HOURS PRN
Status: DISCONTINUED | OUTPATIENT
Start: 2022-11-18 | End: 2022-11-19 | Stop reason: HOSPADM

## 2022-11-18 RX ORDER — SENNA AND DOCUSATE SODIUM 50; 8.6 MG/1; MG/1
1 TABLET, FILM COATED ORAL 2 TIMES DAILY
Status: DISCONTINUED | OUTPATIENT
Start: 2022-11-18 | End: 2022-11-19 | Stop reason: HOSPADM

## 2022-11-18 RX ORDER — PROCHLORPERAZINE EDISYLATE 5 MG/ML
5 INJECTION INTRAMUSCULAR; INTRAVENOUS
Status: DISCONTINUED | OUTPATIENT
Start: 2022-11-18 | End: 2022-11-18 | Stop reason: HOSPADM

## 2022-11-18 RX ORDER — SODIUM CHLORIDE 9 MG/ML
INJECTION, SOLUTION INTRAVENOUS PRN
Status: DISCONTINUED | OUTPATIENT
Start: 2022-11-18 | End: 2022-11-18 | Stop reason: SDUPTHER

## 2022-11-18 RX ORDER — SODIUM CHLORIDE 0.9 % (FLUSH) 0.9 %
5-40 SYRINGE (ML) INJECTION EVERY 12 HOURS SCHEDULED
Status: DISCONTINUED | OUTPATIENT
Start: 2022-11-18 | End: 2022-11-19 | Stop reason: HOSPADM

## 2022-11-18 RX ORDER — ASPIRIN 81 MG/1
81 TABLET ORAL 2 TIMES DAILY
Status: DISCONTINUED | OUTPATIENT
Start: 2022-11-18 | End: 2022-11-19 | Stop reason: HOSPADM

## 2022-11-18 RX ORDER — LIDOCAINE HYDROCHLORIDE 10 MG/ML
1 INJECTION, SOLUTION INFILTRATION; PERINEURAL
Status: DISCONTINUED | OUTPATIENT
Start: 2022-11-18 | End: 2022-11-18 | Stop reason: HOSPADM

## 2022-11-18 RX ORDER — VALSARTAN 160 MG/1
160 TABLET ORAL DAILY
Status: DISCONTINUED | OUTPATIENT
Start: 2022-11-18 | End: 2022-11-19 | Stop reason: HOSPADM

## 2022-11-18 RX ORDER — SODIUM CHLORIDE 9 MG/ML
INJECTION, SOLUTION INTRAVENOUS CONTINUOUS
Status: DISCONTINUED | OUTPATIENT
Start: 2022-11-18 | End: 2022-11-19 | Stop reason: HOSPADM

## 2022-11-18 RX ORDER — ONDANSETRON 2 MG/ML
4 INJECTION INTRAMUSCULAR; INTRAVENOUS EVERY 6 HOURS PRN
Status: DISCONTINUED | OUTPATIENT
Start: 2022-11-18 | End: 2022-11-19 | Stop reason: HOSPADM

## 2022-11-18 RX ORDER — LIDOCAINE HYDROCHLORIDE 20 MG/ML
INJECTION, SOLUTION EPIDURAL; INFILTRATION; INTRACAUDAL; PERINEURAL PRN
Status: DISCONTINUED | OUTPATIENT
Start: 2022-11-18 | End: 2022-11-18 | Stop reason: SDUPTHER

## 2022-11-18 RX ORDER — KETOROLAC TROMETHAMINE 30 MG/ML
INJECTION, SOLUTION INTRAMUSCULAR; INTRAVENOUS PRN
Status: DISCONTINUED | OUTPATIENT
Start: 2022-11-18 | End: 2022-11-18 | Stop reason: HOSPADM

## 2022-11-18 RX ORDER — ONDANSETRON 4 MG/1
8 TABLET, ORALLY DISINTEGRATING ORAL EVERY 8 HOURS PRN
Status: DISCONTINUED | OUTPATIENT
Start: 2022-11-18 | End: 2022-11-19 | Stop reason: HOSPADM

## 2022-11-18 RX ORDER — OXYCODONE HYDROCHLORIDE 5 MG/1
5 TABLET ORAL EVERY 4 HOURS PRN
Status: DISCONTINUED | OUTPATIENT
Start: 2022-11-18 | End: 2022-11-19 | Stop reason: HOSPADM

## 2022-11-18 RX ORDER — MELOXICAM 7.5 MG/1
7.5 TABLET ORAL 2 TIMES DAILY
Status: DISCONTINUED | OUTPATIENT
Start: 2022-11-21 | End: 2022-11-19 | Stop reason: HOSPADM

## 2022-11-18 RX ORDER — SODIUM CHLORIDE 0.9 % (FLUSH) 0.9 %
5-40 SYRINGE (ML) INJECTION EVERY 12 HOURS SCHEDULED
Status: DISCONTINUED | OUTPATIENT
Start: 2022-11-18 | End: 2022-11-18 | Stop reason: SDUPTHER

## 2022-11-18 RX ORDER — PANTOPRAZOLE SODIUM 40 MG/1
40 TABLET, DELAYED RELEASE ORAL
Status: DISCONTINUED | OUTPATIENT
Start: 2022-11-19 | End: 2022-11-19 | Stop reason: HOSPADM

## 2022-11-18 RX ORDER — HYDROMORPHONE HYDROCHLORIDE 1 MG/ML
0.25 INJECTION, SOLUTION INTRAMUSCULAR; INTRAVENOUS; SUBCUTANEOUS EVERY 5 MIN PRN
Status: DISCONTINUED | OUTPATIENT
Start: 2022-11-18 | End: 2022-11-18 | Stop reason: HOSPADM

## 2022-11-18 RX ORDER — SODIUM CHLORIDE 0.9 % (FLUSH) 0.9 %
5-40 SYRINGE (ML) INJECTION EVERY 12 HOURS SCHEDULED
Status: DISCONTINUED | OUTPATIENT
Start: 2022-11-18 | End: 2022-11-18 | Stop reason: HOSPADM

## 2022-11-18 RX ORDER — DIPHENHYDRAMINE HYDROCHLORIDE 50 MG/ML
25 INJECTION INTRAMUSCULAR; INTRAVENOUS EVERY 6 HOURS PRN
Status: DISCONTINUED | OUTPATIENT
Start: 2022-11-18 | End: 2022-11-19 | Stop reason: HOSPADM

## 2022-11-18 RX ORDER — ONDANSETRON 2 MG/ML
4 INJECTION INTRAMUSCULAR; INTRAVENOUS
Status: DISCONTINUED | OUTPATIENT
Start: 2022-11-18 | End: 2022-11-18 | Stop reason: HOSPADM

## 2022-11-18 RX ORDER — SODIUM CHLORIDE 9 MG/ML
INJECTION, SOLUTION INTRAVENOUS PRN
Status: DISCONTINUED | OUTPATIENT
Start: 2022-11-18 | End: 2022-11-19 | Stop reason: HOSPADM

## 2022-11-18 RX ORDER — SODIUM CHLORIDE 9 MG/ML
INJECTION, SOLUTION INTRAVENOUS PRN
Status: DISCONTINUED | OUTPATIENT
Start: 2022-11-18 | End: 2022-11-18 | Stop reason: HOSPADM

## 2022-11-18 RX ORDER — ROPIVACAINE HYDROCHLORIDE 2 MG/ML
INJECTION, SOLUTION EPIDURAL; INFILTRATION; PERINEURAL PRN
Status: DISCONTINUED | OUTPATIENT
Start: 2022-11-18 | End: 2022-11-18 | Stop reason: HOSPADM

## 2022-11-18 RX ORDER — TRANEXAMIC ACID 650 MG/1
1300 TABLET ORAL
Status: COMPLETED | OUTPATIENT
Start: 2022-11-18 | End: 2022-11-18

## 2022-11-18 RX ADMIN — PROPOFOL 30 MG: 10 INJECTION, EMULSION INTRAVENOUS at 09:09

## 2022-11-18 RX ADMIN — SODIUM CHLORIDE, SODIUM LACTATE, POTASSIUM CHLORIDE, AND CALCIUM CHLORIDE: 600; 310; 30; 20 INJECTION, SOLUTION INTRAVENOUS at 08:13

## 2022-11-18 RX ADMIN — Medication 5 MG: at 10:17

## 2022-11-18 RX ADMIN — Medication 5 MG: at 09:44

## 2022-11-18 RX ADMIN — Medication 10 MG: at 10:09

## 2022-11-18 RX ADMIN — PHENYLEPHRINE HYDROCHLORIDE 150 MCG: 0.1 INJECTION, SOLUTION INTRAVENOUS at 10:08

## 2022-11-18 RX ADMIN — SENNOSIDES AND DOCUSATE SODIUM 1 TABLET: 50; 8.6 TABLET ORAL at 20:22

## 2022-11-18 RX ADMIN — SODIUM CHLORIDE, SODIUM LACTATE, POTASSIUM CHLORIDE, AND CALCIUM CHLORIDE: 600; 310; 30; 20 INJECTION, SOLUTION INTRAVENOUS at 09:45

## 2022-11-18 RX ADMIN — PHENYLEPHRINE HYDROCHLORIDE 150 MCG: 0.1 INJECTION, SOLUTION INTRAVENOUS at 10:00

## 2022-11-18 RX ADMIN — Medication 10 MG: at 09:16

## 2022-11-18 RX ADMIN — TRANEXAMIC ACID 1000 MG: 100 INJECTION, SOLUTION INTRAVENOUS at 09:42

## 2022-11-18 RX ADMIN — PROPOFOL 75 MCG/KG/MIN: 10 INJECTION, EMULSION INTRAVENOUS at 09:08

## 2022-11-18 RX ADMIN — OXYCODONE 5 MG: 5 TABLET ORAL at 11:08

## 2022-11-18 RX ADMIN — ONDANSETRON 4 MG: 2 INJECTION INTRAMUSCULAR; INTRAVENOUS at 10:09

## 2022-11-18 RX ADMIN — CEFAZOLIN 2000 MG: 10 INJECTION, POWDER, FOR SOLUTION INTRAVENOUS at 16:09

## 2022-11-18 RX ADMIN — PHENYLEPHRINE HYDROCHLORIDE 100 MCG: 0.1 INJECTION, SOLUTION INTRAVENOUS at 09:43

## 2022-11-18 RX ADMIN — TRANEXAMIC ACID 1300 MG: 650 TABLET ORAL at 16:08

## 2022-11-18 RX ADMIN — Medication 10 MG: at 10:14

## 2022-11-18 RX ADMIN — KETOROLAC TROMETHAMINE 15 MG: 30 INJECTION, SOLUTION INTRAMUSCULAR; INTRAVENOUS at 16:13

## 2022-11-18 RX ADMIN — Medication 5 MG: at 09:57

## 2022-11-18 RX ADMIN — OXYCODONE 5 MG: 5 TABLET ORAL at 12:37

## 2022-11-18 RX ADMIN — CELECOXIB 100 MG: 100 CAPSULE ORAL at 08:07

## 2022-11-18 RX ADMIN — HYDROMORPHONE HYDROCHLORIDE 0.25 MG: 1 INJECTION, SOLUTION INTRAMUSCULAR; INTRAVENOUS; SUBCUTANEOUS at 11:22

## 2022-11-18 RX ADMIN — OXYCODONE 5 MG: 5 TABLET ORAL at 16:08

## 2022-11-18 RX ADMIN — PHENYLEPHRINE HYDROCHLORIDE 150 MCG: 0.1 INJECTION, SOLUTION INTRAVENOUS at 10:14

## 2022-11-18 RX ADMIN — ASPIRIN 81 MG: 81 TABLET, COATED ORAL at 20:22

## 2022-11-18 RX ADMIN — MEPIVACAINE HYDROCHLORIDE 55 MG: 20 INJECTION, SOLUTION EPIDURAL; INFILTRATION at 09:02

## 2022-11-18 RX ADMIN — Medication 5 MG: at 09:51

## 2022-11-18 RX ADMIN — Medication 5 MG: at 10:21

## 2022-11-18 RX ADMIN — Medication 5 MG: at 09:36

## 2022-11-18 RX ADMIN — PHENYLEPHRINE HYDROCHLORIDE 150 MCG: 0.1 INJECTION, SOLUTION INTRAVENOUS at 09:48

## 2022-11-18 RX ADMIN — PHENYLEPHRINE HYDROCHLORIDE 100 MCG: 0.1 INJECTION, SOLUTION INTRAVENOUS at 09:30

## 2022-11-18 RX ADMIN — TRANEXAMIC ACID 1000 MG: 100 INJECTION, SOLUTION INTRAVENOUS at 09:21

## 2022-11-18 RX ADMIN — PHENYLEPHRINE HYDROCHLORIDE 100 MCG: 0.1 INJECTION, SOLUTION INTRAVENOUS at 09:36

## 2022-11-18 RX ADMIN — Medication 2000 MG: at 09:11

## 2022-11-18 RX ADMIN — PHENYLEPHRINE HYDROCHLORIDE 50 MCG: 0.1 INJECTION, SOLUTION INTRAVENOUS at 09:51

## 2022-11-18 RX ADMIN — Medication 10 MG: at 10:00

## 2022-11-18 RX ADMIN — LIDOCAINE HYDROCHLORIDE 100 MG: 20 INJECTION, SOLUTION EPIDURAL; INFILTRATION; INTRACAUDAL; PERINEURAL at 09:08

## 2022-11-18 RX ADMIN — PHENYLEPHRINE HYDROCHLORIDE 150 MCG: 0.1 INJECTION, SOLUTION INTRAVENOUS at 09:57

## 2022-11-18 RX ADMIN — PHENYLEPHRINE HYDROCHLORIDE 100 MCG: 0.1 INJECTION, SOLUTION INTRAVENOUS at 09:24

## 2022-11-18 RX ADMIN — ACETAMINOPHEN 1000 MG: 500 TABLET, FILM COATED ORAL at 16:13

## 2022-11-18 RX ADMIN — Medication 5 MG: at 09:30

## 2022-11-18 RX ADMIN — PHENYLEPHRINE HYDROCHLORIDE 50 MCG: 0.1 INJECTION, SOLUTION INTRAVENOUS at 10:17

## 2022-11-18 RX ADMIN — PHENYLEPHRINE HYDROCHLORIDE 100 MCG: 0.1 INJECTION, SOLUTION INTRAVENOUS at 09:21

## 2022-11-18 RX ADMIN — PHENYLEPHRINE HYDROCHLORIDE 50 MCG: 0.1 INJECTION, SOLUTION INTRAVENOUS at 10:21

## 2022-11-18 RX ADMIN — PHENYLEPHRINE HYDROCHLORIDE 100 MCG: 0.1 INJECTION, SOLUTION INTRAVENOUS at 10:24

## 2022-11-18 RX ADMIN — ACETAMINOPHEN 1000 MG: 500 TABLET, FILM COATED ORAL at 12:37

## 2022-11-18 RX ADMIN — OXYCODONE 5 MG: 5 TABLET ORAL at 20:22

## 2022-11-18 RX ADMIN — Medication 5 MG: at 09:24

## 2022-11-18 RX ADMIN — TRANEXAMIC ACID 1300 MG: 650 TABLET ORAL at 12:37

## 2022-11-18 ASSESSMENT — PAIN SCALES - GENERAL
PAINLEVEL_OUTOF10: 3
PAINLEVEL_OUTOF10: 4
PAINLEVEL_OUTOF10: 7
PAINLEVEL_OUTOF10: 7

## 2022-11-18 ASSESSMENT — PAIN DESCRIPTION - LOCATION
LOCATION: HIP

## 2022-11-18 ASSESSMENT — HOOS JR
BENDING TO THE FLOOR TO PICK UP OBJECT: 2
HOOS JR RAW SCORE: 12
HOOS JR RAW SCORE: 12
RISING FROM SITTING: 2
GOING UP OR DOWN STAIRS: 2
SITTING: 2
HOOS JR TOTAL INTERVAL SCORE: 52.965
WALKING ON UNEVEN SURFACE: 2
LYING IN BED (TURNING OVER, MAINTAINING HIP POSITION): 2

## 2022-11-18 ASSESSMENT — PAIN DESCRIPTION - DESCRIPTORS
DESCRIPTORS: DISCOMFORT
DESCRIPTORS: ACHING

## 2022-11-18 ASSESSMENT — PAIN DESCRIPTION - ORIENTATION
ORIENTATION: RIGHT

## 2022-11-18 ASSESSMENT — PAIN - FUNCTIONAL ASSESSMENT: PAIN_FUNCTIONAL_ASSESSMENT: 0-10

## 2022-11-18 NOTE — ANESTHESIA POSTPROCEDURE EVALUATION
Department of Anesthesiology  Postprocedure Note    Patient: Padmini Hidalgo  MRN: [id number]  YOB: 1934  Date of evaluation: 11/18/2022      Procedure Summary     Date: 11/18/22 Room / Location: [facility] OR  / [facility] [place] [facility] MAIN OR    Anesthesia Start: 7783 Anesthesia Stop: 7122    Procedure: RIGHT HIP TOTAL ARTHROPLASTY  EARL (Right: Hip) Diagnosis:       Primary osteoarthritis of right hip      (Primary osteoarthritis of right hip [M16.11])    Surgeons: Hill Johnson MD Responsible Provider: Issa Montoya MD    Anesthesia Type: Not recorded ASA Status: Not recorded          Anesthesia Type: No value filed.     Saige Phase I: Saige Score: 9    Saige Phase II:        Anesthesia Post Evaluation    Patient location during evaluation: PACU  Patient participation: complete - patient participated  Level of consciousness: awake  Airway patency: patent  Nausea & Vomiting: no nausea  Complications: no  Cardiovascular status: blood pressure returned to baseline  Respiratory status: acceptable  Hydration status: euvolemic  Multimodal analgesia pain management approach

## 2022-11-18 NOTE — PROGRESS NOTES
TRANSFER - IN REPORT:    Verbal report received from Hayden Marley RN on Bello Graham  being received from PACU for routine post-op      Report consisted of patient's Situation, Background, Assessment and   Recommendations(SBAR). Information from the following report(s) Nurse Handoff Report was reviewed with the receiving nurse. Opportunity for questions and clarification was provided. Assessment completed upon patient's arrival to unit and care assumed. Oriented to room and bed controls. Family member in room. Foam tape bandage to R hip dry and intact. SCDs and yellow gripper socks to LES. Moving LES and c/o pain.

## 2022-11-18 NOTE — CARE COORDINATION
Medical record reviewed. Pt is an 81 y/o male admitted for a RTHA. CM met with patient and wife to introduce self and explain role in planning. Prior to admission, pt was living independently in his home with his wife. He plans to discharge home when medically stable, likely tomorrow. Pt in agreement with plan for home health services and referral to [facility]. CM will initiate referral. Pt in need of a 3 in 1 bedside commode. No preference in providers. Referral initiated to [facility] DME delivered to pts room by CM. No additional discharge planning needs anticipated. 11/18/22 1204   Service Assessment   Patient Orientation Alert and Oriented;Person;Place;Situation;Self   Cognition Alert   History Provided By Patient   Primary Caregiver Self   Accompanied By/Relationship Wife, Jack Plasencia is: Legal Next of Kin   PCP Verified by CM Yes   Last Visit to PCP Within last 3 months   Prior Functional Level Independent in ADLs/IADLs   Current Functional Level Assistance with the following:;Cooking;Housework; Shopping;Mobility   Family able to assist with home care needs: Yes   Would you like for me to discuss the discharge plan with any other family members/significant others, and if so, who?  Yes  (wife)   Social/Functional History   Lives With Spouse   Type of Home House   ADL Assistance Independent   Homemaking Assistance Independent   Ambulation Assistance Independent   Transfer Assistance Independent

## 2022-11-18 NOTE — PROGRESS NOTES
11/18/22 1413   Treatment   Treatment Type IS   Treatment Tolerance Well   Duration 5   Is patient on O2?  Y   Oxygen Therapy/Pulse Ox   O2 Therapy Room air   O2 Device None (Room air)   Heart Rate 80   SpO2 92 %   $Pulse Oximeter $Spot check (single)   Incentive Spirometry Tx   Treatment Effort Assisted by RT   Achieved Volume (mL) 2000 mL

## 2022-11-18 NOTE — ANESTHESIA PROCEDURE NOTES
Spinal Block    Patient location during procedure: OR  End time: 11/18/2022 9:07 AM  Reason for block: primary anesthetic  Staffing  Performed: anesthesiologist   Anesthesiologist: Anibal Lilly MD  Spinal Block  Patient position: sitting  Prep: ChloraPrep and site prepped and draped  Patient monitoring: cardiac monitor, continuous pulse ox and frequent blood pressure checks  Approach: left paramedian  Location: L2/L3  Provider prep: mask and sterile gloves  Needle  Needle type: Quincke   Needle gauge: 25 G  Needle length: 3.5 in  Assessment  Events: SAB placement uncomplicated. Swirl obtained: Yes  CSF: clear  Attempts: 1  Hemodynamics: stable  Additional Notes  3 cc 1% lidocaine local injected at needle insertion site.   Preanesthetic Checklist  Completed: patient identified, IV checked, risks and benefits discussed, equipment checked, pre-op evaluation, timeout performed, anesthesia consent given, oxygen available and monitors applied/VS acknowledged

## 2022-11-18 NOTE — PERIOP NOTE
TRANSFER - OUT REPORT:    Verbal report given to [facility] RN on Johnny Graves  being transferred to [facility] access hospital for routine progression of patient care       Report consisted of patient's Situation, Background, Assessment and   Recommendations(SBAR). Information from the following report(s) Nurse Handoff Report was reviewed with the receiving nurse. [place] Assessment: No data recorded  Lines:   Peripheral IV 11/18/22 Distal;Left;Posterior Forearm (Active)   Site Assessment Clean, dry & intact 11/18/22 1055   Line Status Infusing 11/18/22 [address] Connections checked and tightened 11/18/22 1055   Phlebitis Assessment No symptoms 11/18/22 1055   Infiltration Assessment 0 11/18/22 1055   Alcohol Cap Used No 11/18/22 1055   Dressing Status Clean, dry & intact 11/18/22 1055   Dressing Type Transparent 11/18/22 1055        Opportunity for questions and clarification was provided.       Patient transported with:  [facility]

## 2022-11-18 NOTE — OP NOTE
Total Hip Procedure Note    Randall Ortar,  [id number],  1934    Pre-operative Diagnosis: Primary osteoarthritis of right hip [M16.11]    Post-operative Diagnosis: Same    Procedure: Right Total Hip Arthroplasty    BMI: Body mass index is 26.73 kg/m². [facility] Location: [address] [place] [address]    Surgeon: Ciaran Roth MD    Assistant: Roderick Figueroa CFA and Nani Clayton NP CFA    Anesthesia: Spinal     Complications: None    EBL: 200cc    Drains: None    Intra-op Findings: Pre-operatively leg lengths were assessed using preop Xrays and with the patient in the lateral decubitus position and operative leg was felt to be similar in length compared to the contralateral leg. The operative hip showed notable cartilage loss of both the femoral head and acetabulum. No intra-operative periprosthetic fracture was encountered. Sciatic nerve was noted to be intact at the end of the procedure. We measured the distance of our resected femoral head/neck from the cut surface to the center of the femoral head to be approximately 35mm. The overall length replaced with the implanted head/neck was 35mm. Intra-operatively we felt that we restored the patients leg lengths to equal lengths using the method described later. Postop with the patient supine we assessed leg lengths and felt they were similar. Patient condition at completion of Procedure: Stable    Implants:   Implant Name Type Inv.  Item Serial No.  Lot No. LRB No. Used Action   SHELL ACET SZ F [id number] CLUS H TRITANIUM PRESSFIT DARINEL - [id number]  SHELL ACET SZ F [id number] CLUS H TRITANIUM PRESSFIT DARINEL  [employer] [facility] [id number] Right 1 Implanted   SCREW BNE L35MM [id number] LO PROF HEX TRIDENT LL - [id number]  SCREW BNE L35MM [id number] LO PROF HEX TRIDENT   [employer] [facility] [id number] Right 1 Implanted   INSERT ACET F 10 DEG 36 MM HIP X3 TRIDENT - [id number]  INSERT ACET F 10 DEG 36 MM HIP X3 TRIDENT  [employer] [facility] [id number] Right 1 Implanted   STEM FEM SZ 4 L105MM NK L35MM 42MM OFFSET 127DEG HIP TI - [id number]  STEM FEM SZ 4 L105MM NK L35MM 42MM OFFSET 127DEG HIP TI  [employer] [facility] [id number] Right 1 Implanted   HEAD FEM [id number] +0MM OFFSET HIP BIOLOX DELT CERAMIC TAPR - [id number]  HEAD FEM [id number] +0MM OFFSET HIP BIOLOX DELT CERAMIC TAPR  [facility] [id number] Right 1 Implanted       Description of Procedure    The complexity of the total joint surgery requires the use of a first assistant. The above individual assisted with positioning, prepping, draping of the patient before the procedure and instrument manipulation, extremity repositioning and retraction during the procedure as well as wound closure, dressing application and brace application after the procedure (if applicable). No intern, resident, or other hospital staff was available to assist during the surgery. Colleen Otero was brought to the operating room. Patient was transferred from the stretcher to OR bed. Anesthesia was induced. IV antibiotics were administered per protocol. Colleen Otero was positioned in the lateral decubitus position and the pelvis/torso stabilized with posts. The right leg was prepped and draped in the usual sterile fashion. A time out identifying the patient, procedure, operative side and surgeon was administered and charted by the OR Nurse. Prior to incision one gram of TXA was given intravenously. A standard posterior approach was utilized to expose the right hip. The incision was carried through the subcutaneous tissue and underlying fascia with hemostasis obtained using the bovie cauterization and Aquamantys. A Charmley retractor was inserted. We resected any redundent bursal tissue off the external rotators. We were able to identify the piriformis tendon. The short external rotators and capsule were dissected off the posterior femur as a single layer just superior to the piriformis tendon.  The sciatic nerve was palpated and protected during dissection. The femoral head was dislocated. The articular surface revealed loss of cartilage, exposed bone and bone spurring. The neck was osteotomized approximately 1cm above the top of the lesser trochanter. We were careful to protect the greater trochanter during osteotomy and protect it from iatrogenic injury. Acetabular retractors were placed both anterior and posterior just superficial to the acetabular labrum. Remaining acetabular labrum was resected and any soft tissue was removed from the acetabulum including any tissue within the codyloid fossa. The acetabular surface revealed loss of cartilage with exposed bone. The acetabulum was sequentially reamed noting proper anteversion and inclination during reaming. The transverse acetabular ligament was used as the primary anatomic landmark to determine version and inclination. The acetabulum was sized to a 58 mm acetabular component. The prepared acetabulum was irrigated of any residual reamings and soft tissue. The permanent acetabular component was impacted into place to achieve appropriate horizontal tilt, anteversion, bone coverage and stability. We visualize that the acetabular component was fully seated. A trial liner was impacted into position. We did not have to excise overhanging osteophytes anterior and posterior  in order to minimize the risk of impingement. We then turned our attention to the proximal femur. A 2-prong proximal femoral retractor was placed. We gained access to the proximal femur using a  and femoral canal finder. The canal was prepared with appropriate lateralization in which we used the initial smaller broaches to remove lateral bone to avoid varus placement of the femoral component. The canal was then broached progressively. The broach was positioned with the appropriate anatomic femoral anteversion. We broached up to a size 4 Accolade II stem. A calcar planar was used.  A trial reduction with a size #4 127 degree Accolade II stem with a +0 neck length and 36 mm head was performed. This was found to be the most stable with maximum hip flexion and with internal/external rotation testing. We did not appreciate any evidence of component or bony impingement. Limb lengths were assessed using three techniques. First, the position of the tip of the operative greater trochanter was compared to the center of the femoral head component and was felt to match this same anatomic relationship as the non-operative hip based on preoperative X-rays. Next, we measured the length of our resected femoral head neck and felt that the trial components matched this resected length as closely as possible when accounting for the length provided by the femoral neck/head. Finally, we compared leg lengths by comparing the possible of the patella with the patients heels together with the patient in the lateral decubitus position. Using these methods it was felt that the patients leg lengths were equilibrated and with appropriate stability as mentioned above. All trial components were removed. Prior to final polyethylene insertion one screw was placed to further stabilize the cup. The final liner was impacted into place which was a 10 degree elevated liner. A cementless size 4 127 degree Accolade II stem was impacted into place carefully. We were careful to observe the calcar region for any iatrogenic fractures during insertion. The permanent femoral head was impacted into place which was a +0mm 36mm ceramic head. Josphine Herminia Jose Antonio's hip was reduced and stability was as mentioned above. Dilute Betadine solution was allowed to sit in the surgical site for a 3 minute period and copious saline was used to irrigate the surgical site. The sciatic nerve was palpated and noted to be intact.  A periarticular of ropivicaine, toradol, and morphine was injected about the surgical site with care being taken not to inject in the vicinity of neurovascular structures. Prior to wound closure one additional gram of TXA was given for a total of 2 grams. The capsule was repaired with a three #2 Fiberwires secured through drill holes placed in the posterior aspect of the trochanter. No drain was placed. The fascia was closed with a #0 Bidirectional Stratafix barbed suture. The sub-Q layer was closed with 2-0 monocril suture. A 3-0 moncril stratafix suture in a running subcuticular fashion was used to close the skin. The incision site was thoroughly cleaned with alcohol and a Prineo wound closure system was applied to seal it off from external contamination after the overlying skin was thoroughly cleaned with alcohol. A thin layer of [place] lubricant was applied over the wound to keep the dressing from adhering to the overlying sterile bandage and said bandage was placed. Drapes were then broken down and patient was transferred carefully back to the OR stretcher. The sponge and needle counts were correct. The patient tolerated the procedure without difficulty and left the operating room in satisfactory condition.     Signed By: Stanley Alva MD

## 2022-11-18 NOTE — PROGRESS NOTES
ACUTE PHYSICAL THERAPY GOALS:   (Developed with and agreed upon by patient and/or caregiver.)  GOALS (1-4 days):  (1.)Mr. Brady will move from supine to sit and sit to supine  in bed with SUPERVISION. (2.)Mr. Brady will transfer from bed to chair and chair to bed with STAND BY ASSIST using the least restrictive device. (3.)Mr. Brady will ambulate with STAND BY ASSIST for 300 feet with the least restrictive device. (4.)Mr. Brady will ambulate up/down 3 steps with right railing with CONTACT GUARD ASSIST.  (5.)Mr. Brady will state/observe MARCELL precautions with No verbal cues.   ________________________________________________________________________________________________     PHYSICAL THERAPY JOINT CAMP: TOTAL HIP ARTHROPLASTY Initial Assessment and PM  (Link to Caseload Tracking: PT Visit Days : 1  Acknowledge Orders  Time In/Out  PT Charge Capture  Rehab Caseload Tracker  Episode   Iron Alvarez is a 80 y.o. male   PRIMARY DIAGNOSIS: Primary osteoarthritis of right hip  Primary osteoarthritis of right hip [M16.11]  Osteoarthritis of right hip, unspecified osteoarthritis type [M16.11]  Procedure(s) (LRB):  RIGHT HIP TOTAL ARTHROPLASTY  EARL (Right)  Day of Surgery  Reason for Referral: Pain in Right Hip (M25.551)  Stiffness of Right Hip, Not elsewhere classified (M25.651)  Difficulty in walking, Not elsewhere classified (R26.2)  Outpatient in a bed: Payor: MEDICARE / Plan: MEDICARE PART A AND B / Product Type: *No Product type* /     REHAB RECOMMENDATIONS:   Recommendation to date pending progress:  Setting:  Home Health Therapy    Equipment:    3 in 1 Bedside Commode  Pt has rolling walker     GAIT: I Mod I S SBA CGA Min Mod Max Total  NT x2 Comments:   Level of Assistance [] [] [] [] [x] [] [] [] [] [] []    Weightbearing Status  Right Lower Extremity Weight Bearing: Weight Bearing As Tolerated    Distance  additional 150ft after an initial 50ft gait assessment     Gait Quality Antalgic, Decreased jesus , Decreased step clearance, Decreased step length, and Decreased stance    DME Rolling Walker     Stairs  NT    Ramp N/A    I=Independent, Mod I=Modified Independent, S=Supervision, SBA=Standby Assistance, CGA=Contact Guard Assistance,   Min=Minimal Assistance, Mod=Moderate Assistance, Max=Maximal Assistance, Total=Total Assistance, NT=Not Tested      ASSESSMENT:   ASSESSMENT:  Mr. Yulia Woodward presented with impaired strength & mobility s/p right MARCELL. Patient also showed decreased stability during out of bed activity. This patient will benefit from follow up therapy to help restore safe function prior to returning home with caregiver. Outcome Measure:   HOOS-JR:  Total Raw Score (0-24 Scale): 12    SUBJECTIVE:   Mr. Yulia Woodward states, that he is agreeable to therapy activity    Home Environment/Prior Level of Function Lives With: Spouse  Type of Home: House  Home Layout: One level  Home Access: Stairs to enter with rails  Entrance Stairs - Number of Steps: 3  Entrance Stairs - Rails: Right  ADL Assistance: Independent  Homemaking Assistance: Independent  Ambulation Assistance:  (with left loft strand for last 6 weeks)  Transfer Assistance: Independent    OBJECTIVE:     PAIN: VITAL SIGNS: LINES/DRAINS:   Pre Treatment:  Pain Assessment: 0-10  Pain Level: 3  Pain Location: Hip  Pain Orientation: Right  Non-Pharmaceutical Pain Intervention(s): Ambulation/Increased Activity; Exercise      Post Treatment: 3/10 Vitals NT       Oxygen NT        IV    RESTRICTIONS/PRECAUTIONS:  Restrictions/Precautions: Weight Bearing, Fall Risk  Right Lower Extremity Weight Bearing: Weight Bearing As Tolerated        Hip dislocation precautions        LOWER EXTREMITY GROSS EVALUATION:  RIGHT LE   Within Functional Limits   Abnormal   Comments   Strength [] [x]  Decreased but functional   Range of Motion [] [x]  Decreased but functional      LEFT LE Within Functional Limits   Abnormal   Comments   Strength [x] []     Range of Motion [x] []       UPPER EXTREMITY GROSS EVALUATION:     Within Functional Limits   Abnormal   Comments   Strength [x] []    Range of Motion [x] []      SENSATION  Sensation [x]  grossly     COGNITION/  PERCEPTION: Intact Impaired (Comments):   Orientation [x]     Vision [x]     Hearing [x]     Cognition  [x]       MOBILITY: I Mod I S SBA CGA Min Mod Max Total  NT x2 Comments:   Bed Mobility    Rolling [] [] [] [x] [] [] [] [] [] [] []    Supine to Sit [] [] [] [x] [] [] [] [] [] [] []    Scooting [] [] [] [x] [] [] [] [] [] [] []    Sit to Supine [] [] [] [] [] [] [] [] [] [] []    Transfers    Sit to Stand [] [] [] [] [x] [] [] [] [] [] []    Bed to Chair [] [] [] [] [x] [] [] [] [] [] [] With walker   Stand to Sit [] [] [] [] [x] [] [] [] [] [] []    Stand Pivot [] [] [] [] [x] [] [] [] [] [] []    Toilet [] [] [] [] [] [] [] [] [] [] []     [] [] [] [] [] [] [] [] [] [] []    I=Independent, Mod I=Modified Independent, S=Supervision, SBA=Standby Assistance, CGA=Contact Guard Assistance,   Min=Minimal Assistance, Mod=Moderate Assistance, Max=Maximal Assistance, Total=Total Assistance, NT=Not Tested    BALANCE: Good Fair+ Fair Fair- Poor NT Comments   Sitting Static [] [] [x] [] [] []    Sitting Dynamic [] [] [x] [] [] []              Standing Static [] [] [x] [x] [] [] With walker   Standing Dynamic [] [] [x] [x] [] [] With walker     PLAN:   FREQUENCY AND DURATION: BID for duration of hospital stay or until stated goals are met, whichever comes first.    THERAPY PROGNOSIS: Good    PROBLEM LIST:   (Skilled intervention is medically necessary to address:)  Decreased ADL/Functional Activities, Decreased Activity Tolerance, Decreased AROM/PROM, Decreased Gait Ability, Decreased Strength, Decreased Transfer Abilities, and Increased Pain   INTERVENTIONS PLANNED:   (Benefits and precautions of physical therapy have been discussed with the patient.)  Therapeutic Activity, Therapeutic Exercise/HEP, Gait Training, Modalities, and Education       TREATMENT:   EVALUATION: LOW COMPLEXITY: (Untimed Charge)    TREATMENT:   Therapeutic Activity (38 Minutes): Therapeutic activity included MARCELL exercises, reviewed PT role, POC, & PT expectations, reviewed hip precautions, reviewed diagonal wt shift to reduce the risk of blood clots & for prep to wean off walker, progressive gait training an additional 150 ft after an initial 50 ft gait assessment to improve functional Activity tolerance, Balance, Coordination, Mobility, and Strength. TREATMENT GRID:  THERAPEUTIC  EXERCISES: DATE:  11/18 DATE:   DATE:      AM PM AM PM AM PM    [] [] [] [] [] []   Ankle Pumps  20       Quad Sets  20       Gluteal Sets  20       Hip Abd/ADduction  20       Knee Slides  20       Short Arc Quads  20       Long Arc Quads  20                                  B = bilateral; AA = active assistive; A = active; P = passive      EDUCATION: Education Given To: Patient, Family, Caregiver  Education Provided: Role of Therapy, Plan of Care, Home Exercise Program, Precautions, Transfer Training, IADL Safety, Family Education, Equipment, Fall Prevention Strategies  Education Method: Demonstration, Verbal, Teach Back  Education Outcome: Continued education needed  EDUCATION:  [x] Home Exercises  [x] Fall Precautions  [x] Hip Precautions  [] D/C Instruction Review [] Hip Prosthesis Review  [x] Walker Management/Safety  [] Adaptive Equipment as Needed     AFTER TREATMENT PRECAUTIONS: Bed/Chair Locked, Call light within reach, Chair, Needs within reach, RN notified, and Visitors at bedside    INTERDISCIPLINARY COLLABORATION:  RN/ PCT and OT/ BRUNSON    Compliance with Program/Exercises: Will assess as treatment progresses. Recommendations/Intent for next treatment session:  Treatment next visit will focus on increasing Mr. Fritz Baez independence with bed mobility, transfers, gait training, strength/ROM exercises, modalities for pain, and patient education.      TIME IN/OUT:  Time In: 1509  Time Out: 1365 [facility]  Minutes: [facility].  Ricci Root

## 2022-11-18 NOTE — H&P
Patient ID:  Renny Pandey  [id number]  72 y.o.  1934    Today: November 18, 2022       CC:  Right hip pain    HPI:   The patient has end stage arthritis of the right hip. The patient was evaluated and examined in the office prior to today and was found to have a history on physical exam consistent with intra-articular pathology of the right hip. Patient complains of anterior groin pain predominately. Pain occurs during activity. Patient has difficulty putting on socks/shoes and has notable pain when getting up from a chair and getting out of a car. Patient does not complain of significant lateral hip pain or radicular pain down the leg. There have been no changes to the patient's orthopedic condition since the last office visit    Past Medical History:  Past Medical History:   Diagnosis Date    Arthritis     mild    Arthropathy, unspecified, site unspecified 7/8/2015    Back pain     Chronic renal insufficiency     pt denies 10/25/22    Diverticulosis of large intestine without diverticulitis 2016    Essential hypertension, benign 7/8/2015    managed with medication    Full dentures     GERD (gastroesophageal reflux disease)     hx-no medication at this time (noted 10/25/22)    Hand pain, left     Hip pain, right     Hx of colonic polyps 2016    adenoma    Osteoarthritis of finger of left hand     Other abnormal glucose 7/8/2015    Other and unspecified hyperlipidemia 7/8/2015    Shoulder pain        Past Surgical History:  Past Surgical History:   Procedure Laterality Date    COLONOSCOPY  last 3/21/16    Dorie--single 3 mm asc TA--5 year recall if healthy and doing well        Medications:     Prior to Admission medications    Medication Sig Start Date End Date Taking?  Authorizing Provider   acetaminophen (TYLENOL) 500 MG tablet Take 2 tablets by mouth every 6 hours as needed for Pain 11/11/22   Mildred Bills MD   aspirin EC 81 MG EC tablet Take 1 tablet by mouth in the morning and at bedtime 22   Sanchez Sher MD   omeprazole (PRILOSEC) 40 MG delayed release capsule Take 1 capsule by mouth daily 22   Sanchez Sher MD   ondansetron (ZOFRAN) 4 MG tablet Take 1 tablet by mouth 3 times daily as needed for Nausea or Vomiting 22   Sanchez Sher MD   oxyCODONE HCl (OXY-IR) 10 MG immediate release tablet Take 1 tablet by mouth every 4 hours as needed for Pain for up to 7 days. 7 day supply 22  Sanchez Sher MD   senna (SENOKOT) 8.6 MG tablet Take 1 tablet by mouth 2 times daily 22   Sanchez Sher MD   valsartan (DIOVAN) 160 MG tablet Take 160 mg by mouth daily 22   Historical Provider, MD   Naproxen Sodium (ALEVE) 220 MG CAPS Take by mouth    Historical Provider, MD   Multiple Vitamins-Minerals (THERAPEUTIC MULTIVITAMIN-MINERALS) tablet Take 1 tablet by mouth daily    Historical Provider, MD   amLODIPine (NORVASC) 5 MG tablet Take 5 mg by mouth 17   Ar Automatic Reconciliation       Family History:     Family History   Problem Relation Age of Onset    Diabetes Daughter     Heart Disease Father        Social History:      Social History     Tobacco Use    Smoking status: Former     Packs/day: 3.00     Types: Cigarettes     Quit date: 1979     Years since quittin.9    Smokeless tobacco: Never   Substance Use Topics    Alcohol use: Yes     Alcohol/week: 6.0 standard drinks     Comment: social         Allergies:    No Known Allergies     Vitals: There were no vitals taken for this visit. Objective:         General: No Acute distress                   HEENT: Normocephalic/atramatic                   Lungs:  Breathing non-labored                   Heart:   RRR                    Abdomen: soft       Extremities:  Prior exam done in office has been consistent with end-stage hip arthritis. We have noted pain with ROM of the right hip which manifests as anterior groin pain. There is decreased internal and external rotation of the affected hip.  No significant pain with palpation over the greater trochanteric bursa. No radicular pain with straight leg raise. Patient walks with and antalgic gait. Distally patient has no neurologic deficit. Patient Active Problem List   Diagnosis    Sciatica    Osteoarthritis of finger of left hand    Arthritis    Abnormal glucose    Benign hypertension    Chronic renal insufficiency    Hyperlipidemia    Other abnormal glucose    Primary osteoarthritis of right hip       Assessment:   1. Arthritis of the Right hip    Plan:   The patient has failed previous conservative treatment for this condition. The patient has pain in the right hip which causes daily symptoms which affects the patient's activities of daily living and quality of life. The risks, benefits, alternatives and possible complications of right total hip arthroplasty have been discussed with the patient including but not limited to infection, bleeding, damage to nerves and blood vessels with particular attention given to risk of damage of the femoral, obturator, lateral femoral cutaneous, superior gluteal, inferior gluteal, and sciatic nerve, risk of dislocation, fracture both intra-op and post-op, limb length inequality, polyethylene wear, implant loosening, risk for continued pain, and risk for revision surgery secondary to but not limited to all of these discussed risks. Further we discussed risk of venous thrombo-embolism including deep vein thrombosis and pulmonary embolism despite being on prophylaxis. We have also previously discussed the potential of morbidity and mortality associated with, but not limited to, the actual surgical procedure, anesthesia, prior medical conditions, and/or the administration of medications perioperatively. I have made no guarantees to the patient regarding outcomes and the patient has voiced understanding of that.  The patient has been given the opportunity to ask questions all of which have been answered and the patient wishes to proceed. The patient will be admitted the day of surgery for right total hip replacement. The patient was counseled at length about the risks of kale Covid-19 during their perioperative period and any recovery window from their procedure. The patient was made aware that kale Covid-19  may worsen their prognosis for recovering from their procedure and lend to a higher morbidity and/or mortality risk. All material risks, benefits, and reasonable alternatives including postponing the procedure were discussed. The patient does  wish to proceed with the procedure at this time.         Signed By: Dontae Peters MD  November 18, 2022

## 2022-11-18 NOTE — ANESTHESIA PRE PROCEDURE
Department of Anesthesiology  Preprocedure Note       Name:  Johnny Graves   Age:  80 y.o.  :  1934                                          MRN:  [id number]         Date:  2022      Surgeon: Mic Stephenson):  Elonda Cushing, MD    Procedure: Procedure(s):  RIGHT HIP TOTAL ARTHROPLASTY  EARL    Medications prior to admission:   Prior to Admission medications    Medication Sig Start Date End Date Taking? Authorizing Provider   acetaminophen (TYLENOL) 500 MG tablet Take 2 tablets by mouth every 6 hours as needed for Pain 22   Elonda Cushing, MD   aspirin EC 81 MG EC tablet Take 1 tablet by mouth in the morning and at bedtime  Patient not taking: Reported on 2022   Elonda Cushing, MD   omeprazole (PRILOSEC) 40 MG delayed release capsule Take 1 capsule by mouth daily  Patient not taking: Reported on 2022   Elonda Cushing, MD   ondansetron (ZOFRAN) 4 MG tablet Take 1 tablet by mouth 3 times daily as needed for Nausea or Vomiting  Patient not taking: Reported on 2022   Elonda Cushing, MD   oxyCODONE HCl (OXY-IR) 10 MG immediate release tablet Take 1 tablet by mouth every 4 hours as needed for Pain for up to 7 days.  7 day supply  Patient not taking: Reported on 2022  Elonda Cushing, MD   senna (SENOKOT) 8.6 MG tablet Take 1 tablet by mouth 2 times daily  Patient not taking: Reported on 2022   Elonda Cushing, MD   valsartan (DIOVAN) 160 MG tablet Take 160 mg by mouth daily 22   Historical Provider, MD   Naproxen Sodium 220 MG CAPS Take by mouth    Historical Provider, MD   Multiple Vitamins-Minerals (THERAPEUTIC MULTIVITAMIN-MINERALS) tablet Take 1 tablet by mouth daily    Historical Provider, MD   amLODIPine (NORVASC) 5 MG tablet Take 5 mg by mouth 17   Ar Automatic Reconciliation       Current medications:    Current Facility-Administered Medications   Medication Dose Route Frequency Provider Last Rate Last Admin    [START ON 11/19/2022] amLODIPine (NORVASC) tablet 5 mg  5 mg Oral Daily Evelin Limb, APRN - CNP        valsartan (DIOVAN) tablet 160 mg  160 mg Oral Daily Evelin Limb, APRN - CNP        0.9 % sodium chloride infusion   IntraVENous Continuous Evelin Limb, APRN - CNP   Stopped at 11/18/22 1159    sodium chloride flush 0.9 % injection 5-40 mL  5-40 mL IntraVENous 2 times per day Veelin Limb, APRN - CNP        sodium chloride flush 0.9 % injection 5-40 mL  5-40 mL IntraVENous PRN Evelin Limb, APRN - CNP        0.9 % sodium chloride infusion   IntraVENous PRN Evelin Limb, APRN - CNP        ceFAZolin (ANCEF) 2000 mg in sterile water 20 mL IV syringe  2,000 mg IntraVENous Q8H Evelin Limb, APRN - CNP        acetaminophen (TYLENOL) tablet 1,000 mg  1,000 mg Oral Q6H Evelin Limb, APRN - CNP   1,000 mg at 11/18/22 1237    HYDROmorphone (DILAUDID) injection 0.5 mg  0.5 mg IntraVENous Q3H PRN Evelin Limb, APRN - CNP        sennosides-docusate sodium (SENOKOT-S) 8.6-50 MG tablet 1 tablet  1 tablet Oral BID Evelin Limb, APRN - CNP        ondansetron (ZOFRAN-ODT) disintegrating tablet 4 mg  4 mg Oral Q8H PRN Evelin Limb, APRN - CNP        Or    ondansetron [facility]) injection 4 mg  4 mg IntraVENous Q6H PRN Evelin Limb, APRN - CNP        diphenhydrAMINE (BENADRYL) capsule 25 mg  25 mg Oral Q6H PRN Evelin Limb, APRN - CNP        Or    diphenhydrAMINE (BENADRYL) injection 25 mg  25 mg IntraVENous Q6H PRN Evelin Limb, APRN - CNP        aspirin EC tablet 81 mg  81 mg Oral BID Evelin Limb, APRN - CNP        oxyCODONE (ROXICODONE) immediate release tablet 5 mg  5 mg Oral Q4H PRN Evelin Limb, APRN - CNP   5 mg at 11/18/22 1237    [START ON 11/19/2022] dexamethasone (DECADRON) injection 10 mg  10 mg IntraVENous Once Evelin Limb, APRN - CNP        ketorolac (TORADOL) injection 15 mg  15 mg IntraVENous q8h Evelin Limb, APRN - CNP       Tyson Johnston ON 11/21/2022] meloxicam (MOBIC) tablet 7.5 mg  7.5 mg Oral BID CALE Fonseca CNP        naloxone [facility]) injection 0.4 mg  0.4 mg IntraVENous PRN CALE Fonseca CNP        ondansetron (ZOFRAN-ODT) disintegrating tablet 8 mg  8 mg Oral Q8H PRN CALE Fonseca CNP        [START ON 11/19/2022] pantoprazole (PROTONIX) tablet 40 mg  40 mg Oral QAM AC CALE Fonseca - CNP        tranexamic acid (LYSTEDA) tablet 1,300 mg  1,300 mg Oral Q3H (SCHEDULED) CALE Fonseca CNP   1,300 mg at 11/18/22 1237       Allergies:  No Known Allergies    Problem List:    Patient Active Problem List   Diagnosis Code    Sciatica M54.30    Osteoarthritis of finger of left hand M19.042    Arthritis M19.90    Abnormal glucose R73.09    Benign hypertension I10    Chronic renal insufficiency N18.9    Hyperlipidemia E78.5    Other abnormal glucose R73.09    Primary osteoarthritis of right hip M16.11    Osteoarthritis of right hip, unspecified osteoarthritis type M16.11       Past Medical History:        Diagnosis Date    Arthritis     mild    Arthropathy, unspecified, site unspecified 7/8/2015    Back pain     Chronic renal insufficiency     pt denies 10/25/22    Diverticulosis of large intestine without diverticulitis 2016    Essential hypertension, benign 7/8/2015    managed with medication    Full dentures     GERD (gastroesophageal reflux disease)     hx-no medication at this time (noted 10/25/22)    Hand pain, left     Hip pain, right     Hx of colonic polyps 2016    adenoma    Osteoarthritis of finger of left hand     Other abnormal glucose 7/8/2015    Other and unspecified hyperlipidemia 7/8/2015    Shoulder pain        Past Surgical History:        Procedure Laterality Date    COLONOSCOPY  last 3/21/16    Dorie--single 3 mm asc TA--5 year recall if healthy and doing well       Social History:    Social History     Tobacco Use    Smoking status: Former Packs/day: 3.00     Types: Cigarettes     Quit date: 1979     Years since quittin.9    Smokeless tobacco: Never   Substance Use Topics    Alcohol use: Yes     Alcohol/week: 6.0 standard drinks     Comment: social                                Counseling given: Not Answered      Vital Signs (Current):   Vitals:    22 1055 22 1100 22 1138 22 1413   BP: (!) 136/98 134/64 (!) 130/58    Pulse: 82 77 72 80   Resp:     Temp:       TempSrc:       SpO2: 97% 97% 92% 92%   Weight:   181 lb (82.1 kg)    Height:   5' 9\" (1.753 m)                                               BP Readings from Last 3 Encounters:   22 (!) 130/58   10/25/22 (!) 161/60       NPO Status: Time of last liquid consumption:                         Time of last solid consumption:                         Date of last liquid consumption: 22                        Date of last solid food consumption: 22    BMI:   Wt Readings from Last 3 Encounters:   22 181 lb (82.1 kg)   10/25/22 178 lb (80.7 kg)   09/15/22 182 lb (82.6 kg)     Body mass index is 26.73 kg/m². CBC:   Lab Results   Component Value Date/Time    WBC 8.5 10/25/2022 12:25 PM    RBC 4.95 10/25/2022 12:25 PM    HGB 14.3 10/25/2022 12:25 PM    HCT 42.9 10/25/2022 12:25 PM    MCV 86.7 10/25/2022 12:25 PM    RDW 12.3 10/25/2022 12:25 PM     10/25/2022 12:25 PM       CMP:   Lab Results   Component Value Date/Time     10/25/2022 12:25 PM    K 3.9 10/25/2022 12:25 PM     10/25/2022 12:25 PM    CO2 28 10/25/2022 12:25 PM    BUN 14 10/25/2022 12:25 PM    CREATININE 0.85 10/25/2022 12:25 PM    LABGLOM >60 10/25/2022 12:25 PM    GLUCOSE 96 10/25/2022 12:25 PM    CALCIUM 9.6 10/25/2022 12:25 PM       POC Tests: No results for input(s): POCGLU, POCNA, POCK, POCCL, POCBUN, POCHEMO, POCHCT in the last 72 hours.     Coags:   Lab Results   Component Value Date/Time    PROTIME 13.0 10/25/2022 12:25 PM    INR 0.9 10/25/2022 12:25 PM    APTT 26.5 10/25/2022 12:25 PM       HCG (If Applicable): No results found for: PREGTESTUR, PREGSERUM, HCG, HCGQUANT     ABGs: No results found for: PHART, PO2ART, RII2DUP, SKS1VIZ, BEART, K5JBWLPZ     Type & Screen (If Applicable):  No results found for: LABABO, LABRH    Drug/Infectious Status (If Applicable):  No results found for: HIV, HEPCAB    COVID-19 Screening (If Applicable): No results found for: COVID19        Anesthesia Evaluation  Patient summary reviewed and Nursing notes reviewed  Airway: Mallampati: II  TM distance: >3 FB   Neck ROM: full  Mouth opening: > = 3 FB   Dental:    (+) edentulous      Pulmonary:Negative Pulmonary ROS breath sounds clear to auscultation                             Cardiovascular:  Exercise tolerance: Limited by hip pain  (+) hypertension:,     (-) murmur      Rhythm: regular  Rate: normal                    Neuro/Psych:   Negative Neuro/Psych ROS              GI/Hepatic/Renal:   (+) renal disease: CRI,           Endo/Other:                     Abdominal:             Vascular: negative vascular ROS. Other Findings:           Anesthesia Plan      spinal     ASA 2           MIPS: Postoperative opioids intended and Prophylactic antiemetics administered. Anesthetic plan and risks discussed with patient and spouse.                         Christine Sánchez MD   11/18/2022

## 2022-11-18 NOTE — PROGRESS NOTES
OCCUPATIONAL THERAPY Initial Assessment, Daily Note, and PM      (Link to Caseload Tracking:    OT Orders   Time  OT Charge Capture  Rehab Caseload Tracker  Episode     Bello Graham is a 80 y.o. male   PRIMARY DIAGNOSIS: Primary osteoarthritis of right hip  Primary osteoarthritis of right hip [M16.11]  Osteoarthritis of right hip, unspecified osteoarthritis type [M16.11]  Procedure(s) (LRB):  RIGHT HIP TOTAL ARTHROPLASTY  EARL (Right)  Day of Surgery  Reason for Referral: Pain in Right Hip (M25.551)  Stiffness of Right Hip, Not elsewhere classified (M25.651)  Outpatient in a bed: Payor: MEDICARE / Plan: MEDICARE PART A AND B / Product Type: *No Product type* /     ASSESSMENT:     REHAB RECOMMENDATIONS:   Recommendation to date pending progress:  Setting:  Home Health Therapy    Equipment:    3 in 1 Bedside Commode  Rolling Walker     ASSESSMENT:  Mr. Vita Padilla is s/p right MARCELL and presents with decreased independence with functional mobility and activities of daily living as compared to baseline level of function and safety. Patient would benefit from skilled Occupational Therapy to maximize independence and safety with self-care task and functional mobility. Patient able to complete  lower body dressing, upper body dressing, and functional mobility  at recliner/chair with minimal assist .  Mobilized from hospital bed to recliner/chair using a rolling walker with assist. Patient is hopeful to spend the night to better prepare for safe discharge home. Pt moves fairly well but will spend the night. OT will follow in the morning for further self care training.       [address] [facility] [address] [address] AM-PAC 6 Clicks Daily Activity Inpatient Short Form:    AM-PAC Daily Activity Inpatient   How much help for putting on and taking off regular lower body clothing?: A Little  How much help for Bathing?: A Little  How much help for Toileting?: A Little  How much help for putting on and taking off regular upper body clothing?: None  How much help for taking care of personal grooming?: None  How much help for eating meals?: None  AM-PAC Inpatient Daily Activity Raw Score: 21  AM-PAC Inpatient ADL T-Scale Score : 44.27  ADL Inpatient CMS 0-100% Score: 32.79  ADL Inpatient CMS G-Code Modifier : CJ     SUBJECTIVE:     Mr. Vonnie Bernal states, his wife went home and he is spending the night      Social/Functional   Lives With: Spouse  Type of Home: House  Home Layout: One level  Home Access: Stairs to enter with rails  Entrance Stairs - Number of Steps: 3  Entrance Stairs - Rails: Right  ADL Assistance: Independent  Homemaking Assistance: Independent  Ambulation Assistance:  (with left loft strand for last 6 weeks)  Transfer Assistance: Independent    OBJECTIVE:     Oscar Victoria / Leigh Ann Rodriguez / Ranjith Hooksp: None    RESTRICTIONS/PRECAUTIONS:  Restrictions/Precautions: Weight Bearing, Fall Risk  Right Lower Extremity Weight Bearing: Weight Bearing As Tolerated    PAIN: VITALS / O2:   Pre Treatment:          Post Treatment: none Vitals          Oxygen        GROSS EVALUATION: INTACT IMPAIRED   (See Comments)   UE AROM [x] []   UE PROM [x] []   Strength [x]       Posture / Balance []  Decreased standing balance    Sensation [x]     Coordination [x]       Tone [x]       Edema []    Activity Tolerance [x]       Hand Dominance R [] L []      COGNITION/  PERCEPTION: INTACT IMPAIRED   (See Comments)   Orientation [x]     Vision [x]     Hearing [x]     Cognition  [x]     Perception [x]       MOBILITY: I Mod I S SBA CGA Min Mod Max Total  NT x2 Comments:   Bed Mobility    Rolling [] [] [] [] [] [] [] [] [] [] []    Supine to Sit [] [] [] [] [] [] [] [] [] [] []    Scooting [] [] [] [] [] [] [] [] [] [] []    Sit to Supine [] [] [] [] [] [] [] [] [] [] []    Transfers    Sit to Stand [] [] [] [] [x] [] [] [] [] [] []    Bed to Chair [] [] [] [] [x] [] [] [] [] [] []    Stand to Sit [] [] [] [] [x] [] [] [] [] [] []    Tub/Shower [] [] [] [] [] [x] [] [] [] [] []       Toilet [] [] [] [] [] [x] [] [] [] [] []        [] [] [] [] [] [] [] [] [] [] []    I=Independent, Mod I=Modified Independent, S=Supervision/Setup, SBA=Standby Assistance, CGA=Contact Guard Assistance, Min=Minimal Assistance, Mod=Moderate Assistance, Max=Maximal Assistance, Total=Total Assistance, NT=Not Tested    ACTIVITIES OF DAILY LIVING: I Mod I S SBA CGA Min Mod Max Total NT Comments   BASIC ADLs:              Upper Body Bathing [] [] [] [x] [] [] [] [] [] []    Lower Body Bathing [] [] [] [] [x] [] [] [] [] []    Toileting [] [] [] [] [x] [] [] [] [] []    Upper Body Dressing [] [] [] [x] [] [] [] [] [] []    Lower Body Dressing [] [] [] [] [] [x] [] [] [] []    Feeding [x] [] [] [] [] [] [] [] [] []    Grooming [] [] [] [x] [] [] [] [] [] []    Personal Device Care [] [] [] [] [] [] [] [] [] []    Functional Mobility [] [] [] [] [x] [] [] [] [] []    I=Independent, Mod I=Modified Independent, S=Supervision/Setup, SBA=Standby Assistance, CGA=Contact Guard Assistance, Min=Minimal Assistance, Mod=Moderate Assistance, Max=Maximal Assistance, Total=Total Assistance, NT=Not Tested    PLAN:     FREQUENCY/DURATION   OT Plan of Care: 1 time/week for duration of hospital stay or until stated goals are met, whichever comes first.    ACUTE OCCUPATIONAL THERAPY GOALS:   (Developed with and agreed upon by patient and/or caregiver.)    GOALS:   DISCHARGE GOALS (in preparation for going home/rehab):  3 days  1. Mr. Jessie Chirinos will perform lower body dressing activity with stand by assist required to demonstrate improved functional mobility and safety. 2.  Mr. Jessie Chirinos will perform bathing activity with stand by assist required to demonstrate improved functional mobility and safety. 3.  Mr. Jessie Chirinos will perform toileting activity with  stand by assist to demonstrate improved functional mobility and safety.   4.  Mr. Jessie Chirinos will perform all functional transfers transfer with stand by assist to demonstrate improved functional mobility and safety. PROBLEM LIST:   (Skilled intervention is medically necessary to address:)  Decreased ADL/Functional Activities  Decreased Balance  Increased Pain   INTERVENTIONS PLANNED:   (Benefits and precautions of occupational therapy have been discussed with the patient.)  Self Care Training  Education         TREATMENT:     EVALUATION: LOW COMPLEXITY: (Untimed Charge)    TREATMENT:   Self Care: (20 min): Procedure(s) (per grid) utilized to improve and/or restore self-care/home management as related to dressing and functional mobility . Required minimal verbal, manual, and   cueing to facilitate activities of daily living skills and compensatory activities.     AFTER TREATMENT PRECAUTIONS: Bed/Chair Locked, Call light within reach, Chair, Needs within reach, RN notified, and Visitors at bedside    INTERDISCIPLINARY COLLABORATION:  RN/ PCT, PT/ PTA, and OT/ BRUNSON    EDUCATION:  Education Given To: Patient  Education Provided: Role of Therapy, Plan of Care, Transfer Training, ADL Adaptive Strategies, Fall Prevention Strategies  Education Outcome: Verbalized understanding, Demonstrated understanding, Continued education needed  [] Safe And Effective Hygiene  [x] Fall Precautions  [x] Hip Precautions  [] D/C Instruction Review [] Prosthesis Review  [x] Walker Management/Safety  [x] Adaptive Equipment as Needed  [x] Therapeutic Resting Position of Joint       TOTAL TREATMENT DURATION AND TIME:  Time In: Iban  Time Out: 8276  Minutes: LIANG Napier

## 2022-11-19 VITALS
HEART RATE: 73 BPM | WEIGHT: 181 LBS | OXYGEN SATURATION: 92 % | RESPIRATION RATE: 18 BRPM | DIASTOLIC BLOOD PRESSURE: 47 MMHG | BODY MASS INDEX: 26.81 KG/M2 | SYSTOLIC BLOOD PRESSURE: 136 MMHG | HEIGHT: 69 IN | TEMPERATURE: 97.7 F

## 2022-11-19 LAB
HCT VFR BLD AUTO: 30.5 % (ref 41.1–50.3)
HGB BLD-MCNC: 10.1 G/DL (ref 13.6–17.2)

## 2022-11-19 PROCEDURE — 36415 COLL VENOUS BLD VENIPUNCTURE: CPT

## 2022-11-19 PROCEDURE — 6370000000 HC RX 637 (ALT 250 FOR IP): Performed by: NURSE PRACTITIONER

## 2022-11-19 PROCEDURE — 97530 THERAPEUTIC ACTIVITIES: CPT

## 2022-11-19 PROCEDURE — 2500000003 HC RX 250 WO HCPCS: Performed by: NURSE PRACTITIONER

## 2022-11-19 PROCEDURE — 85014 HEMATOCRIT: CPT

## 2022-11-19 PROCEDURE — 97535 SELF CARE MNGMENT TRAINING: CPT

## 2022-11-19 PROCEDURE — 6360000002 HC RX W HCPCS: Performed by: NURSE PRACTITIONER

## 2022-11-19 RX ADMIN — PANTOPRAZOLE SODIUM 40 MG: 40 TABLET, DELAYED RELEASE ORAL at 05:07

## 2022-11-19 RX ADMIN — OXYCODONE 5 MG: 5 TABLET ORAL at 09:11

## 2022-11-19 RX ADMIN — CEFAZOLIN 2000 MG: 10 INJECTION, POWDER, FOR SOLUTION INTRAVENOUS at 02:00

## 2022-11-19 RX ADMIN — OXYCODONE 5 MG: 5 TABLET ORAL at 01:59

## 2022-11-19 RX ADMIN — SENNOSIDES AND DOCUSATE SODIUM 1 TABLET: 50; 8.6 TABLET ORAL at 09:11

## 2022-11-19 RX ADMIN — ACETAMINOPHEN 1000 MG: 500 TABLET, FILM COATED ORAL at 05:07

## 2022-11-19 RX ADMIN — ASPIRIN 81 MG: 81 TABLET, COATED ORAL at 09:10

## 2022-11-19 RX ADMIN — VALSARTAN 160 MG: 160 TABLET, FILM COATED ORAL at 09:10

## 2022-11-19 RX ADMIN — AMLODIPINE BESYLATE 5 MG: 5 TABLET ORAL at 09:10

## 2022-11-19 RX ADMIN — KETOROLAC TROMETHAMINE 15 MG: 30 INJECTION, SOLUTION INTRAMUSCULAR; INTRAVENOUS at 01:59

## 2022-11-19 RX ADMIN — ACETAMINOPHEN 1000 MG: 500 TABLET, FILM COATED ORAL at 01:59

## 2022-11-19 ASSESSMENT — PAIN DESCRIPTION - ORIENTATION: ORIENTATION: RIGHT

## 2022-11-19 ASSESSMENT — PAIN SCALES - GENERAL: PAINLEVEL_OUTOF10: 2

## 2022-11-19 ASSESSMENT — PAIN DESCRIPTION - LOCATION: LOCATION: HIP

## 2022-11-19 NOTE — PROGRESS NOTES
11/18/22 2110   Oxygen Therapy/Pulse Ox   O2 Therapy Room air   O2 Device None (Room air)   Heart Rate 92   SpO2 93 %   Pulse Oximeter Device Mode Continuous   Pulse Oximeter Device Location Left;Finger   $Pulse Oximeter $Spot check (multiple/continuous)   Pt on continuous monitor for HS. Alarm limits set. Pt working on IS.

## 2022-11-19 NOTE — PROGRESS NOTES
ACUTE PHYSICAL THERAPY GOALS:   (Developed with and agreed upon by patient and/or caregiver.)  GOALS (1-4 days):  (1.)Mr. Brady will move from supine to sit and sit to supine  in bed with SUPERVISION. (2.)Mr. Brady will transfer from bed to chair and chair to bed with STAND BY ASSIST using the least restrictive device. Met 11/19  (3.)Mr. Brayd will ambulate with STAND BY ASSIST for 300 feet with the least restrictive device. (4.)Mr. Brady will ambulate up/down 3 steps with right railing with CONTACT GUARD ASSIST. Met 11/19  (5.)Mr. Brady will state/observe MARCELL precautions with No verbal cues.   ________________________________________________________________________________________________     PHYSICAL THERAPY JOINT CAMP: TOTAL HIP ARTHROPLASTY Daily Note and AM  (Link to Caseload Tracking: PT Visit Days : 2  Acknowledge Orders  Time In/Out  PT Charge Capture  Rehab Caseload Tracker  Episode   Escobar Has is a 80 y.o. male   PRIMARY DIAGNOSIS: Primary osteoarthritis of right hip  Primary osteoarthritis of right hip [M16.11]  Osteoarthritis of right hip, unspecified osteoarthritis type [M16.11]  Procedure(s) (LRB):  RIGHT HIP TOTAL ARTHROPLASTY  EARL (Right)  1 Day Post-Op  Reason for Referral: Pain in Right Hip (M25.551)  Stiffness of Right Hip, Not elsewhere classified (M25.651)  Difficulty in walking, Not elsewhere classified (R26.2)  Outpatient in a bed: Payor: MEDICARE / Plan: MEDICARE PART A AND B / Product Type: *No Product type* /     REHAB RECOMMENDATIONS:   Recommendation to date pending progress:  Setting:  Home Health Therapy    Equipment:    3 in 1 Bedside Commode  Pt has rolling walker     GAIT: I Mod I S SBA CGA Min Mod Max Total  NT x2 Comments:   Level of Assistance [] [] [] [x] [] [] [] [] [] [] []    Weightbearing Status  Right Lower Extremity Weight Bearing: Weight Bearing As Tolerated    Distance  260 feet    Gait Quality Antalgic, Decreased jesus , Decreased step clearance, Decreased step length, and Decreased stance    DME Rolling Walker     Stairs Stairs/Curb  Stairs?: Yes  Stairs  # Steps : 3  Rails: Right ascending  Device: No Device  Assistance: Stand by assistance    Ramp N/A    I=Independent, Mod I=Modified Independent, S=Supervision, SBA=Standby Assistance, CGA=Contact Guard Assistance,   Min=Minimal Assistance, Mod=Moderate Assistance, Max=Maximal Assistance, Total=Total Assistance, NT=Not Tested      ASSESSMENT:   ASSESSMENT:  Mr. Ingrid Garza performed all exercises without difficulty. This pt showed increased gait distance & improved level of assist  for transfers & gait. This pt has some difficulty retraining precautions & PT expectations for DC. OT will review these weak areas on follow up when performing ADL's this am prior to DC. This pt is ready for the next phase of his rehab program.     Outcome Measure:   HOOS-JR:  Total Raw Score (0-24 Scale): 12    SUBJECTIVE:   Mr. Ingrid Garza states, No concerns    Home Environment/Prior Level of Function Lives With: Spouse  Type of Home: House  Home Layout: One level  Home Access: Stairs to enter with rails  Entrance Stairs - Number of Steps: 3  Entrance Stairs - Rails: Right  ADL Assistance: Independent  Homemaking Assistance: Independent  Ambulation Assistance:  (with left loft strand for last 6 weeks)  Transfer Assistance: Independent    OBJECTIVE:     PAIN: VITAL SIGNS: LINES/DRAINS:   Pre Treatment:  Pain Assessment: 0-10  Pain Level: 2  Pain Location: Hip  Pain Orientation: Right  Non-Pharmaceutical Pain Intervention(s): Ambulation/Increased Activity; Exercise      Post Treatment: 2/10 Vitals NT       Oxygen NT        IV    RESTRICTIONS/PRECAUTIONS:  Restrictions/Precautions: Weight Bearing, Fall Risk  Right Lower Extremity Weight Bearing: Weight Bearing As Tolerated        Hip dislocation precautions        LOWER EXTREMITY GROSS EVALUATION:  RIGHT LE   Within Functional Limits   Abnormal   Comments   Strength [] [x] Decreased but functional   Range of Motion [] [x]  Decreased but functional      LEFT LE Within Functional Limits   Abnormal   Comments   Strength [x] []     Range of Motion [x] []       UPPER EXTREMITY GROSS EVALUATION:     Within Functional Limits   Abnormal   Comments   Strength [x] []    Range of Motion [x] []      SENSATION  Sensation [x]  grossly     COGNITION/  PERCEPTION: Intact Impaired (Comments):   Orientation [x]     Vision [x]     Hearing [x]     Cognition  [x]       MOBILITY: I Mod I S SBA CGA Min Mod Max Total  NT x2 Comments:   Bed Mobility    Rolling [] [] [] [] [] [] [] [] [] [x] []    Supine to Sit [] [] [] [] [] [] [] [] [] [x] []    Scooting [] [] [x] [] [] [] [] [] [] [] []    Sit to Supine [] [] [] [] [] [] [] [] [] [] []    Transfers    Sit to Stand [] [] [] [x] [] [] [] [] [] [] []    Bed to Chair [] [] [] [x] [] [] [] [] [] [] []    Stand to Sit [] [] [] [x] [] [] [] [] [] [] []    Stand Pivot [] [] [] [x] [] [] [] [] [] [] []    Toilet [] [] [] [] [] [] [] [] [] [] []     [] [] [] [] [] [] [] [] [] [] []    I=Independent, Mod I=Modified Independent, S=Supervision, SBA=Standby Assistance, CGA=Contact Guard Assistance,   Min=Minimal Assistance, Mod=Moderate Assistance, Max=Maximal Assistance, Total=Total Assistance, NT=Not Tested    BALANCE: Good Fair+ Fair Fair- Poor NT Comments   Sitting Static [] [] [x] [] [] []    Sitting Dynamic [] [] [x] [] [] []              Standing Static [] [] [x] [x] [] [] With walker   Standing Dynamic [] [] [x] [x] [] [] With walker     PLAN:   FREQUENCY AND DURATION: BID for duration of hospital stay or until stated goals are met, whichever comes first.    THERAPY PROGNOSIS: Good    PROBLEM LIST:   (Skilled intervention is medically necessary to address:)  Decreased ADL/Functional Activities, Decreased Activity Tolerance, Decreased AROM/PROM, Decreased Gait Ability, Decreased Strength, Decreased Transfer Abilities, and Increased Pain   INTERVENTIONS PLANNED: (Benefits and precautions of physical therapy have been discussed with the patient.)  Therapeutic Activity, Therapeutic Exercise/HEP, Gait Training, Modalities, and Education       TREATMENT:       TREATMENT:   Therapeutic Activity (23 Minutes): Therapeutic activity included MARCELL exercises, reviewed POC, & PT expectations for DC, reviewed hip precautions, reviewed diagonal wt shift to reduce the risk of blood clots & for prep to wean off walker, progressive gait training 260 ft & stair training to improve functional Activity tolerance, Balance, Coordination, Mobility, and Strength. TREATMENT GRID:  THERAPEUTIC  EXERCISES: DATE:  11/18 DATE:  11/19 DATE:      AM PM AM PM AM PM    [] [] [] [] [] []   Ankle Pumps  20 20      Quad Sets  20 20      Gluteal Sets  20 20      Hip Abd/ADduction  20 20      Knee Slides  20 20      Short Arc Quads  20 20      Long Arc Quads  20 20                                 B = bilateral; AA = active assistive; A = active; P = passive      EDUCATION: Education Given To: Patient  Education Provided: Plan of Care, Home Exercise Program, Precautions, Transfer Training, IADL Safety, Family Education, Fall Prevention Strategies, Equipment  Education Method: Demonstration, Verbal, Teach Back, Printed Information/Hand-outs  Education Outcome: Verbalized understanding  EDUCATION:  [x] Home Exercises  [x] Fall Precautions  [x] Hip Precautions  [x] D/C Instruction Review [] Hip Prosthesis Review  [x] Walker Management/Safety  [] Adaptive Equipment as Needed     AFTER TREATMENT PRECAUTIONS: Bed/Chair Locked, Call light within reach, Chair, Needs within reach, and RN notified    INTERDISCIPLINARY COLLABORATION:  RN/ PCT    Compliance with Program/Exercises: Will assess as treatment progresses.     Recommendations/Intent for next treatment session:  Treatment next visit will focus on increasing Mr. Douglas Hollidya independence with bed mobility, transfers, gait training, strength/ROM exercises, modalities for pain, and patient education. TIME IN/OUT:  Time In: 2231  Time Out: 0051  Minutes: [facility].  Jenny Haile

## 2022-11-19 NOTE — DISCHARGE INSTRUCTIONS
IF YOU HAVE ANY PROBLEMS ONCE YOU ARE AT HOME CALL THE FOLLOWING NUMBERS:   Main office number: [phone]      Medications    The medications you are to continue on are listed on the medication reconciliation sheet. Narcotic pain medications as well as supplemental iron can cause constipation. If this occurs try stopping the narcotic pain medication and/or the iron. It is important that you take the medication exactly as they are prescribed. Medications which increase your risk of blood clots are listed to stop for 5 weeks after surgery as well as medications or supplements which increase your risk of bleeding complications. Keep your medication in the bottles provided by the pharmacist and keep a list of the medication names, dosages, and times to be taken in your wallet. Do not take other medications without consulting your doctor. Important Information    Do NOT smoke as this will greatly increase your risk of infection! Resume your prehospital diet. If you have excessive nausea or vomitting call your doctor's office     Leg swelling and warmth is normal for 6 months after surgery. If you experience swelling in your leg elevate you leg while laying down with your toes above your heart. If you have sudden onset severe swelling with leg pain call our office. The stitches deep inside take approximately 6 months to dissolve. There will be sharp shooting, stinging and burning pain. This is normal and will resolve between 3-6 months after surgery. Difficulty sleeping is normal following total Knee and Hip replacement. You may try melatonin, an over-the-counter sleep aid or benadryl to help with sleep. Most patients will resume sleeping through the night 8 weeks after surgery. Home Physical Therapy is arranged. Home Health will contact you within 48 hrs of discharge that you have chosen. If you have not received a call within this time frame please contact that provider you chose.  You should be given this information before you leave the hospital.     You are at a risk for falls. Use the rolling walker when walking. Patients who have had a joint replacement should not drive if they are still taking narcotic pain mediation during the daytime hours. Most patients wean themselves off of pain medication within 2-5 weeks after surgery. When to Call the office    - If you have a temperature greater then 101  - Uncontrolled vomiting   - Loose control of your bladder or bowel function  - Are unable to bear any wieght   - Need a pain medication refill     Hip Replacement Surgery (Posterior): What to Expect at Home  Your Recovery  Hip replacement surgery replaces the worn parts of your hip joint. When you leave the hospital, you will probably be walking with crutches or a walker. You may be able to climb a few stairs and get in and out of bed and chairs. But you will need someone to help you at home until you have more energy and can move around better. You will go home with a bandage and stitches, staples, skin glue, or tape strips. You can remove the bandage when your doctor tells you to. If you have stitches or staples, your doctor will remove them about 2 weeks after your surgery. Glue or tape strips will fall off on their own over time. You may still have some mild pain, and the area may be swollen for 3 to 4 months after surgery. Your doctor may give you medicine for the pain. You will continue the rehabilitation program (rehab) you started in the hospital. The better you do with your rehab exercises, the sooner you will get your strength and movement back. Most people are able to return to work 4 weeks to 4 months after surgery. This care sheet gives you a general idea about how long it will take for you to recover. But each person recovers at a different pace. Follow the steps below to get better as quickly as possible. How can you care for yourself at home?   Activity    Your doctor may not want your affected leg to cross the center of your body toward the other leg. If so, your therapist may suggest these ideas:  Do not cross your legs. Be very careful as you get in or out of bed or a car so your leg does not cross the imaginary line in the middle of your body. Go slowly when you climb stairs. Make sure the lights are on. Have someone watch you, if you can. When you climb stairs:  Step up first with your unaffected leg. Then bring the affected leg up to the same step. Bring your crutches or cane up. To go down stairs, reverse the order. First, put your crutches or cane on the lower step. Then bring the affected leg down to that step. Finally, step down with the unaffected leg. You can ride in a car, but stop at least once every hour to get out and walk around. You may want to sleep on your back. Don't reach down too far to pull up blankets when you lie in bed. If your doctor recommends exercises, do them as directed. You can cut back on your exercises if your muscles start to ache, but don't stop doing them. Rest when you feel tired. You may take a nap, but don't stay in bed all day. Work with your physical therapist to learn the best way to exercise. You will probably have to use a walker, crutches, or a cane for at least 4 to 6 weeks. Your doctor may advise you to stay away from activities that put stress on the joint. This includes sports such as tennis, football, and jogging. Try not to sit for too long at one time. You will feel less stiff if you take a short walk about every hour. When you sit, use chairs with arms, and don't sit in low chairs. Do not bend over more than 90 degrees (like the angle in a letter \"L\"). Sleep on your back with your legs slightly apart or on your side with a pillow between your knees for about 6 weeks or as your doctor tells you. Do not sleep on your stomach or affected leg.      Ask your doctor when you can drive again.     Most people are able to return to work 4 weeks to 4 months after surgery. Ask your doctor when it is okay for you to have sex. Diet    By the time you leave the hospital, you will probably be eating your normal diet. Your doctor may recommend that you take iron and vitamin supplements. Drink plenty of fluids (unless your doctor tells you not to). Eat healthy foods, and watch your portion sizes. Try to stay at your ideal weight. Too much weight puts more stress on your new hip joint. You may notice that your bowel movements are not regular right after your surgery. This is common. Try to avoid constipation and straining with bowel movements. You may want to take a fiber supplement every day. If you have not had a bowel movement after a couple of days, ask your doctor about taking a mild laxative. Medicines    Your doctor will tell you if and when you can restart your medicines. You will also get instructions about taking any new medicines. If you stopped taking aspirin or some other blood thinner, your doctor will tell you when to start taking it again. Your doctor may give you a blood-thinning medicine to prevent blood clots. If you take a blood thinner, be sure you get instructions about how to take your medicine safely. Blood thinners can cause serious bleeding problems. This medicine could be in pill form or as a shot (injection). If a shot is necessary, your doctor will tell you how to do this. Be safe with medicines. Take pain medicines exactly as directed. If the doctor gave you a prescription medicine for pain, take it as prescribed. If you are not taking a prescription pain medicine, ask your doctor if you can take an over-the-counter medicine. If you think your pain medicine is making you sick to your stomach: Take your medicine after meals (unless your doctor has told you not to). Ask your doctor for a different pain medicine.      If your doctor prescribed antibiotics, take them as directed. Do not stop taking them just because you feel better. You need to take the full course of antibiotics. Incision care    If your doctor told you how to care for your cut (incision), follow your doctor's instructions. You will have a dressing over the cut. A dressing helps the incision heal and protects it. Your doctor will tell you how to take care of this. If you did not get instructions, follow this general advice: If you have strips of tape on the cut the doctor made, leave the tape on for a week or until it falls off. If you have stitches or staples, your doctor will tell you when to come back to have them removed. If you have skin glue on the cut, leave it on until it falls off. Skin glue is also called skin adhesive or liquid stitches. Change the bandage every day. Wash the area daily with warm water, and pat it dry. Don't use hydrogen peroxide or alcohol. They can slow healing. You may cover the area with a gauze bandage if it oozes fluid or rubs against clothing. You may shower 24 to 48 hours after surgery. Pat the incision dry. Don't swim or take a bath for the first 2 weeks, or until your doctor tells you it is okay. Exercise    Your physical therapist will teach you exercises to do at home. Always do them as your therapist tells you. Avoid activities where you might fall. Ice and elevation    For pain, put ice or a cold pack on the area for 10 to 20 minutes at a time. Put a thin cloth between the ice and your skin. If your doctor recommended cold therapy using a portable machine, follow the instructions that came with the machine. Your ankle may swell for about 3 months. Prop up your ankle when you ice it or anytime you sit or lie down. Try to keep it above the level of your heart. This will help reduce swelling. Other instructions    Wear compression stockings if your doctor told you to. These may help to prevent blood clots.  Your doctor will tell you how long you need to keep wearing the compression stockings. Try to prevent falls. To avoid falling:  Arrange furniture so that you will not trip on it. Get rid of throw rugs, and move electrical cords out of the way. Walk only in areas with plenty of light. Put grab bars in showers and bathtubs. Try to avoid icy or snowy sidewalks. Choose shoes with good traction, or consider using traction devices that attach to your shoes. Wear shoes with sturdy, flat soles. Follow-up care is a key part of your treatment and safety. Be sure to make and go to all appointments, and call your doctor if you are having problems. It's also a good idea to know your test results and keep a list of the medicines you take. When should you call for help? Call 911 anytime you think you may need emergency care. For example, call if:    You passed out (lost consciousness). You have severe trouble breathing. You have sudden chest pain and shortness of breath, or you cough up blood. Call your doctor now or seek immediate medical care if:    You have signs that your hip may be dislocated, including:  Severe pain and not being able to stand. A crooked leg that looks like your hip is out of position. Not being able to bend or straighten your leg. Your leg or foot is cool or pale or changes color. You cannot feel or move your leg. You have signs of a blood clot, such as:  Pain in your calf, back of the knee, thigh, or groin. Redness and swelling in your leg or groin. Your incision comes open and begins to bleed, or the bleeding increases. You feel like your heart is racing or beating irregularly. You have signs of infection, such as: Increased pain, swelling, warmth, or redness. Red streaks leading from the incision. Pus draining from the incision. A fever.    Watch closely for changes in your health, and be sure to contact your doctor if:    You do not have a bowel movement after taking a laxative. You do not get better as expected. Where can you learn more? Go to [email or web address]://[email or web address]. org and sign in to your [facility] account. Enter M870 in the [facility] box to learn more about \"Hip Replacement Surgery (Posterior): What to Expect at Home. \"     If you do not have an account, please click on the \"Sign Up Now\" link. Current as of: March 9, 2022               Content Version: 13.4  © 2006-2022 [employer]. Care instructions adapted under license by [facility] [place] ([facility]). If you have questions about a medical condition or this instruction, always ask your healthcare professional. [address] any warranty or liability for your use of this information.

## 2022-11-19 NOTE — PROGRESS NOTES
2022         Post Op day: 1 Day Post-Op     Admit Date: 2022  Admit Diagnosis: Primary osteoarthritis of right hip [M16.11]  Osteoarthritis of right hip, unspecified osteoarthritis type [M16.11]  No surgery found  No surgery found    Subjective: Doing well, No complaints, No SOB, No Chest Pain, No Nausea or Vomitting. PT/OT:                             Weight Bearing Status: WBAT  BMP:  No results for input(s): CREA, BUN, NA, K, CL, CO2, AGAP, GLU in the last 72 hours. Patient Vitals for the past 8 hrs:   BP Temp Temp src Pulse Resp SpO2   22 0725 (!) 136/47 97.7 °F (36.5 °C) Oral 73 18 92 %   22 0440 (!) 124/53 97.9 °F (36.6 °C) Oral 82 18 90 %     Temp (24hrs), Av.9 °F (36.6 °C), Min:97.7 °F (36.5 °C), Max:98.2 °F (36.8 °C)    CBC:  Recent Labs     22  0408   HGB 10.1*   HCT 30.5*       Microbiology:     Recent Results (from the past 72 hour(s))   Hemoglobin and Hematocrit    Collection Time: 22  4:08 AM   Result Value Ref Range    Hemoglobin 10.1 (L) 13.6 - 17.2 g/dL    Hematocrit 30.5 (L) 41.1 - 50.3 %       Objective: Vital Signs are Stable, No Acute Distress, Alert and Oriented  Dressing is Dry,  Neurovascular exam is normal.    Assessment:  Patient Active Problem List   Diagnosis    Sciatica    Osteoarthritis of finger of left hand    Arthritis    Abnormal glucose    Benign hypertension    Chronic renal insufficiency    Hyperlipidemia    Other abnormal glucose    Primary osteoarthritis of right hip    Osteoarthritis of right hip, unspecified osteoarthritis type       Plan: Continue Physical Therapy  Monitor Hbg and labs.    Dispo soon      Signed By: Raji Iniguez MD

## 2022-11-19 NOTE — PROGRESS NOTES
OCCUPATIONAL THERAPY Daily Note, Discharge, and AM      (Link to Caseload Tracking:    OT Orders   Time  OT Charge Capture  Rehab Caseload Tracker  Episode     Frances Snow is a 80 y.o. male   PRIMARY DIAGNOSIS: Primary osteoarthritis of right hip  Primary osteoarthritis of right hip [M16.11]  Osteoarthritis of right hip, unspecified osteoarthritis type [M16.11]  Procedure(s) (LRB):  RIGHT HIP TOTAL ARTHROPLASTY  EARL (Right)  1 Day Post-Op  Reason for Referral: Pain in Right Hip (M25.551)  Stiffness of Right Hip, Not elsewhere classified (M25.651)  Outpatient in a bed: Payor: MEDICARE / Plan: MEDICARE PART A AND B / Product Type: *No Product type* /     ASSESSMENT:     REHAB RECOMMENDATIONS:   Recommendation to date pending progress:  Setting:  [facility] [place] [facility]:    3 in 1 Bedside Commode  Rolling Walker     ASSESSMENT:  Mr. Yulia Woodward is s/p right MARCELL and presents with decreased independence with functional mobility and activities of daily living as compared to baseline level of function and safety. Patient would benefit from skilled Occupational Therapy to maximize independence and safety with self-care task and functional mobility. Patient able to complete  lower body dressing, upper body dressing, and functional mobility  at recliner/chair with minimal assist .  Mobilized from hospital bed to recliner/chair using a rolling walker with assist. Patient is hopeful to spend the night to better prepare for safe discharge home. Pt moves fairly well but will spend the night. OT will follow in the morning for further self care training. 11/19/2022  Mr. Yulia Woodward is s/p right MARCELL. Patient declined shower simulated transfer and completed dressing as charted below in ADL grid and is ambulating with rolling walker and stand by assist.  Patient has met 4/4 goals and plans to return home with good family support. Family able to provide patient with appropriate level of assistance at this time.   OT reviewed hip precautions throughout session. Patient instructed to call for assistance when needing to get up from recliner and all needs in reach. Patient verbalized understanding of call light.           [address] [address] [facility] 6 Clicks Daily Activity Inpatient Short Form:    AM-PAC Daily Activity Inpatient   How much help for putting on and taking off regular lower body clothing?: A Little  How much help for Bathing?: A Little  How much help for Toileting?: A Little  How much help for putting on and taking off regular upper body clothing?: None  How much help for taking care of personal grooming?: None  How much help for eating meals?: None  [facility] Inpatient Daily Activity Raw Score: 21  AM-PAC Inpatient ADL T-Scale Score : 44.27  ADL Inpatient CMS 0-100% Score: 32.79  ADL Inpatient CMS G-Code Modifier : CJ     SUBJECTIVE:           Social/Functional   Lives With: Spouse  Type of Home: House  Home Layout: One level  Home Access: Stairs to enter with rails  Entrance Stairs - Number of Steps: 3  Entrance Stairs - Rails: Right  ADL Assistance: Independent  Homemaking Assistance: Independent  Ambulation Assistance:  (with left loft strand for last 6 weeks)  Transfer Assistance: Independent    OBJECTIVE:     Edison Salazar / Hannah Dang / Joel Cheyenne: None    RESTRICTIONS/PRECAUTIONS:  Restrictions/Precautions: Weight Bearing, Fall Risk  Right Lower Extremity Weight Bearing: Weight Bearing As Tolerated    PAIN: VITALS / O2:   Pre Treatment:          Post Treatment: none Vitals          Oxygen        GROSS EVALUATION: INTACT IMPAIRED   (See Comments)   UE AROM [x] []   UE PROM [x] []   Strength [x]       Posture / Balance []  Decreased standing balance    Sensation [x]     Coordination [x]       Tone [x]       Edema []    Activity Tolerance [x]       Hand Dominance R [] L []      COGNITION/  PERCEPTION: INTACT IMPAIRED   (See Comments)   Orientation [x]     Vision [x]     Hearing [x]     Cognition  [x]     Perception [x] MOBILITY: I Mod I S SBA CGA Min Mod Max Total  NT x2 Comments:   Bed Mobility    Rolling [] [] [] [] [] [] [] [] [] [] []    Supine to Sit [] [] [] [] [] [] [] [] [] [] []    Scooting [] [] [] [] [] [] [] [] [] [] []    Sit to Supine [] [] [] [] [] [] [] [] [] [] []    Transfers    Sit to Stand [] [] [] [] [x] [] [] [] [] [] []    Bed to Chair [] [] [] [] [x] [] [] [] [] [] []    Stand to Sit [] [] [] [] [x] [] [] [] [] [] []    Tub/Shower [] [] [] [] [x] [] [] [] [] [] []       Toilet [] [] [] [] [x] [] [] [] [] [] []        [] [] [] [] [] [] [] [] [] [] []    I=Independent, Mod I=Modified Independent, S=Supervision/Setup, SBA=Standby Assistance, CGA=Contact Guard Assistance, Min=Minimal Assistance, Mod=Moderate Assistance, Max=Maximal Assistance, Total=Total Assistance, NT=Not Tested    ACTIVITIES OF DAILY LIVING: I Mod I S SBA CGA Min Mod Max Total NT Comments   BASIC ADLs:              Upper Body Bathing [] [] [] [x] [] [] [] [] [] []    Lower Body Bathing [] [] [] [x] [] [] [] [] [] []    Toileting [] [] [] [x] [] [] [] [] [] []    Upper Body Dressing [] [] [] [x] [] [] [] [] [] []    Lower Body Dressing [] [] [] [x] [] [] [] [] [] []    Feeding [x] [] [] [] [] [] [] [] [] []    Grooming [] [] [] [x] [] [] [] [] [] []    Personal Device Care [] [] [] [] [] [] [] [] [] []    Functional Mobility [] [] [] [x] [] [] [] [] [] []    I=Independent, Mod I=Modified Independent, S=Supervision/Setup, SBA=Standby Assistance, CGA=Contact Guard Assistance, Min=Minimal Assistance, Mod=Moderate Assistance, Max=Maximal Assistance, Total=Total Assistance, NT=Not Tested    PLAN:     FREQUENCY/DURATION   OT Plan of Care: 1 time/week for duration of hospital stay or until stated goals are met, whichever comes first.    ACUTE OCCUPATIONAL THERAPY GOALS:   (Developed with and agreed upon by patient and/or caregiver.)    GOALS:   DISCHARGE GOALS (in preparation for going home/rehab):  3 days  1.   Mr. Ahsan Beltran will perform lower body dressing activity with stand by assist required to demonstrate improved functional mobility and safety. 2.  Mr. Karely Keys will perform bathing activity with stand by assist required to demonstrate improved functional mobility and safety. 3.  Mr. Karely Keys will perform toileting activity with  stand by assist to demonstrate improved functional mobility and safety. 4.  Mr. Karely Keys will perform all functional transfers transfer with stand by assist to demonstrate improved functional mobility and safety. PROBLEM LIST:   (Skilled intervention is medically necessary to address:)  Decreased ADL/Functional Activities  Decreased Balance  Increased Pain   INTERVENTIONS PLANNED:   (Benefits and precautions of occupational therapy have been discussed with the patient.)  Self Care Training  Education         TREATMENT:       TREATMENT:   Self Care: (40 min): Procedure(s) (per grid) utilized to improve and/or restore self-care/home management as related to dressing, toileting, grooming, and functional mobility . Required minimal verbal, manual, and   cueing to facilitate activities of daily living skills and compensatory activities.     AFTER TREATMENT PRECAUTIONS: Bed/Chair Locked, Call light within reach, Chair, Needs within reach, RN notified, and Visitors at bedside    INTERDISCIPLINARY COLLABORATION:  RN/ PCT, PT/ PTA, and OT/ BRUNSON    EDUCATION:  Education Given To: Patient  Education Provided: Role of Therapy, Plan of Care, Transfer Training, ADL Adaptive Strategies, Fall Prevention Strategies  Education Outcome: Verbalized understanding, Demonstrated understanding  [] Safe And Effective Hygiene  [x] Fall Precautions  [x] Hip Precautions  [x] D/C Instruction Review [] Prosthesis Review  [x] Walker Management/Safety  [x] Adaptive Equipment as Needed  [x] Therapeutic Resting Position of Joint       TOTAL TREATMENT DURATION AND TIME:  Time In: 0940  Time Out: 18839 Juan Willson  Minutes: [address]ont, OT

## 2022-11-21 ENCOUNTER — HOME CARE VISIT (OUTPATIENT)
Dept: SCHEDULING | Facility: HOME HEALTH | Age: 87
End: 2022-11-21
Payer: MEDICARE

## 2022-11-21 VITALS
TEMPERATURE: 98.3 F | DIASTOLIC BLOOD PRESSURE: 66 MMHG | BODY MASS INDEX: 27.43 KG/M2 | SYSTOLIC BLOOD PRESSURE: 138 MMHG | WEIGHT: 181 LBS | HEIGHT: 68 IN | HEART RATE: 70 BPM | RESPIRATION RATE: 18 BRPM | OXYGEN SATURATION: 96 %

## 2022-11-21 PROCEDURE — 1090000001 HH PPS REVENUE CREDIT

## 2022-11-21 PROCEDURE — G0151 HHCP-SERV OF PT,EA 15 MIN: HCPCS

## 2022-11-21 PROCEDURE — 400013 HH SOC

## 2022-11-21 PROCEDURE — 0221000100 HH NO PAY CLAIM PROCEDURE

## 2022-11-21 PROCEDURE — 1090000002 HH PPS REVENUE DEBIT

## 2022-11-21 ASSESSMENT — ENCOUNTER SYMPTOMS
PAIN LOCATION - PAIN QUALITY: SHOOTING
DYSPNEA ACTIVITY LEVEL: AFTER AMBULATING 10 - 20 FT

## 2022-11-22 PROCEDURE — 1090000001 HH PPS REVENUE CREDIT

## 2022-11-22 PROCEDURE — 1090000002 HH PPS REVENUE DEBIT

## 2022-11-23 ENCOUNTER — HOME CARE VISIT (OUTPATIENT)
Dept: SCHEDULING | Facility: HOME HEALTH | Age: 87
End: 2022-11-23
Payer: MEDICARE

## 2022-11-23 PROCEDURE — G0157 HHC PT ASSISTANT EA 15: HCPCS

## 2022-11-23 PROCEDURE — 1090000002 HH PPS REVENUE DEBIT

## 2022-11-23 PROCEDURE — 1090000001 HH PPS REVENUE CREDIT

## 2022-11-24 VITALS
DIASTOLIC BLOOD PRESSURE: 60 MMHG | TEMPERATURE: 97.7 F | OXYGEN SATURATION: 96 % | RESPIRATION RATE: 17 BRPM | SYSTOLIC BLOOD PRESSURE: 130 MMHG | HEART RATE: 93 BPM

## 2022-11-24 PROCEDURE — 1090000001 HH PPS REVENUE CREDIT

## 2022-11-24 PROCEDURE — 1090000002 HH PPS REVENUE DEBIT

## 2022-11-24 ASSESSMENT — ENCOUNTER SYMPTOMS: PAIN LOCATION - PAIN QUALITY: DULL

## 2022-11-25 ENCOUNTER — HOME CARE VISIT (OUTPATIENT)
Dept: SCHEDULING | Facility: HOME HEALTH | Age: 87
End: 2022-11-25
Payer: MEDICARE

## 2022-11-25 VITALS
SYSTOLIC BLOOD PRESSURE: 128 MMHG | HEART RATE: 90 BPM | RESPIRATION RATE: 17 BRPM | DIASTOLIC BLOOD PRESSURE: 62 MMHG | TEMPERATURE: 97.9 F | OXYGEN SATURATION: 98 %

## 2022-11-25 PROCEDURE — 1090000001 HH PPS REVENUE CREDIT

## 2022-11-25 PROCEDURE — 1090000002 HH PPS REVENUE DEBIT

## 2022-11-25 PROCEDURE — G0157 HHC PT ASSISTANT EA 15: HCPCS

## 2022-11-25 ASSESSMENT — ENCOUNTER SYMPTOMS: PAIN LOCATION - PAIN QUALITY: ACHE SORE

## 2022-11-26 PROCEDURE — 1090000002 HH PPS REVENUE DEBIT

## 2022-11-26 PROCEDURE — 1090000001 HH PPS REVENUE CREDIT

## 2022-11-27 PROCEDURE — 1090000001 HH PPS REVENUE CREDIT

## 2022-11-27 PROCEDURE — 1090000002 HH PPS REVENUE DEBIT

## 2022-11-28 PROCEDURE — 1090000001 HH PPS REVENUE CREDIT

## 2022-11-28 PROCEDURE — 1090000002 HH PPS REVENUE DEBIT

## 2022-11-29 ENCOUNTER — HOME CARE VISIT (OUTPATIENT)
Dept: SCHEDULING | Facility: HOME HEALTH | Age: 87
End: 2022-11-29
Payer: MEDICARE

## 2022-11-29 VITALS
RESPIRATION RATE: 16 BRPM | SYSTOLIC BLOOD PRESSURE: 160 MMHG | HEART RATE: 75 BPM | DIASTOLIC BLOOD PRESSURE: 70 MMHG | OXYGEN SATURATION: 98 % | TEMPERATURE: 99 F

## 2022-11-29 PROCEDURE — 1090000001 HH PPS REVENUE CREDIT

## 2022-11-29 PROCEDURE — G0151 HHCP-SERV OF PT,EA 15 MIN: HCPCS

## 2022-11-29 PROCEDURE — 1090000002 HH PPS REVENUE DEBIT

## 2022-11-29 ASSESSMENT — ENCOUNTER SYMPTOMS: PAIN LOCATION - PAIN QUALITY: SHARP

## 2022-11-30 PROCEDURE — 1090000002 HH PPS REVENUE DEBIT

## 2022-11-30 PROCEDURE — 1090000001 HH PPS REVENUE CREDIT

## 2022-12-01 ENCOUNTER — HOME CARE VISIT (OUTPATIENT)
Dept: SCHEDULING | Facility: HOME HEALTH | Age: 87
End: 2022-12-01
Payer: MEDICARE

## 2022-12-01 VITALS
TEMPERATURE: 97.8 F | RESPIRATION RATE: 17 BRPM | HEART RATE: 76 BPM | OXYGEN SATURATION: 96 % | SYSTOLIC BLOOD PRESSURE: 150 MMHG | DIASTOLIC BLOOD PRESSURE: 65 MMHG

## 2022-12-01 PROCEDURE — G0157 HHC PT ASSISTANT EA 15: HCPCS

## 2022-12-01 PROCEDURE — 1090000002 HH PPS REVENUE DEBIT

## 2022-12-01 PROCEDURE — 1090000001 HH PPS REVENUE CREDIT

## 2022-12-01 ASSESSMENT — ENCOUNTER SYMPTOMS: PAIN LOCATION - PAIN QUALITY: ACHING, SHARP

## 2022-12-05 ENCOUNTER — HOME CARE VISIT (OUTPATIENT)
Dept: SCHEDULING | Facility: HOME HEALTH | Age: 87
End: 2022-12-05
Payer: MEDICARE

## 2022-12-05 VITALS
OXYGEN SATURATION: 96 % | SYSTOLIC BLOOD PRESSURE: 138 MMHG | HEART RATE: 78 BPM | RESPIRATION RATE: 17 BRPM | DIASTOLIC BLOOD PRESSURE: 64 MMHG | TEMPERATURE: 97.7 F

## 2022-12-05 PROCEDURE — G0157 HHC PT ASSISTANT EA 15: HCPCS

## 2022-12-08 ENCOUNTER — HOME CARE VISIT (OUTPATIENT)
Dept: SCHEDULING | Facility: HOME HEALTH | Age: 87
End: 2022-12-08
Payer: MEDICARE

## 2022-12-08 ENCOUNTER — OFFICE VISIT (OUTPATIENT)
Dept: ORTHOPEDIC SURGERY | Age: 87
End: 2022-12-08

## 2022-12-08 VITALS
SYSTOLIC BLOOD PRESSURE: 130 MMHG | OXYGEN SATURATION: 97 % | DIASTOLIC BLOOD PRESSURE: 60 MMHG | RESPIRATION RATE: 17 BRPM | HEART RATE: 81 BPM | TEMPERATURE: 98.1 F

## 2022-12-08 DIAGNOSIS — Z96.641 STATUS POST RIGHT HIP REPLACEMENT: Primary | ICD-10-CM

## 2022-12-08 PROCEDURE — G0157 HHC PT ASSISTANT EA 15: HCPCS

## 2022-12-08 ASSESSMENT — ENCOUNTER SYMPTOMS: PAIN LOCATION - PAIN QUALITY: SORE

## 2022-12-08 NOTE — PROGRESS NOTES
Patient ID:  Sheeba Mcmahon  [id number]  81 y.o.  1934    Today: December 8, 2022       CHIEF COMPLAINT:  Follow-up of right total hip replacement    HISTORY:  The patient presents today for 3-week follow-up after total hip replacement. The patient continues to improve gradually. Working with physical therapy on strengthening and stretching. They are on aspirin for DVT prophylaxis. Using assistive walking device appropriately. Continues to take medication appropriately. PE:  Incision is examined which is healing well. No erythema, induration or drainage. No significant fluid accumulation around the surgical site distally. Distally able to grossly plantar and dorsiflex foot and ankle. Sensation intact. Limb is perfused. No sign of DVT. X-RAYS:    XR Pelvis 2/3 Views  Views Obtained: AP Pelvis and Frog leg lateral of right hip  Indication: Postop Right Total Hip Replacement  Findings:   X-rays are viewed which show total hip replacement in place on the right side. All hardware appears to be stable compared to immediate postoperative films. The acetabular component appears to be in good position, no evidence of loosening. Anteversion and inclination angle appear to be within acceptable criteria. The stem appears to be appropriately sized without any evidence of subsidence. No evidence of proximal femoral periprosthetic fracture. Hip is reduced. Impression: Normal Xray after total hip replacement    CALE JEFFERSON - CNP    ASSESSMENT:  3 Weeks S/P Right Total Hip Replacement    PLAN:  Continue activity and current weight bearing status. Will refer to outpatient physical therapy to work on strengthening and stretching of the right leg. Continue posterior hip precautions if applicable. Continue to take the aspirin for another week for a full month of DVT prophylaxis. They are given a refill on their pain medication if they feel they are going to run out.   The patient is given a script for antibiotics to be taken for dental prophylaxis. I have asked the patient not to submerge the incision under water or place any creams or oils over this for another week or two. I will plan to check them back in 3 months for follow-up or sooner if they have any issues, questions or concerns.        Signed By: CALE Lozano - FREEDOM  December 8, 2022

## 2022-12-12 ENCOUNTER — HOME CARE VISIT (OUTPATIENT)
Dept: SCHEDULING | Facility: HOME HEALTH | Age: 87
End: 2022-12-12
Payer: MEDICARE

## 2022-12-12 VITALS
TEMPERATURE: 98.8 F | HEART RATE: 87 BPM | SYSTOLIC BLOOD PRESSURE: 162 MMHG | OXYGEN SATURATION: 96 % | RESPIRATION RATE: 16 BRPM | DIASTOLIC BLOOD PRESSURE: 60 MMHG

## 2022-12-12 PROCEDURE — G0151 HHCP-SERV OF PT,EA 15 MIN: HCPCS

## 2022-12-12 ASSESSMENT — ENCOUNTER SYMPTOMS: PAIN LOCATION - PAIN QUALITY: ACHE

## 2022-12-16 ENCOUNTER — OFFICE VISIT (OUTPATIENT)
Dept: ORTHOPEDIC SURGERY | Age: 87
End: 2022-12-16
Payer: MEDICARE

## 2022-12-16 DIAGNOSIS — M25.559 HIP PAIN: ICD-10-CM

## 2022-12-16 DIAGNOSIS — Z96.641 STATUS POST RIGHT HIP REPLACEMENT: Primary | ICD-10-CM

## 2022-12-16 DIAGNOSIS — R29.898 IMPAIRED STRENGTH OF HIP MUSCLES: ICD-10-CM

## 2022-12-16 DIAGNOSIS — Z78.9 DECREASED ACTIVITIES OF DAILY LIVING (ADL): ICD-10-CM

## 2022-12-16 DIAGNOSIS — Z74.09 IMPAIRED FUNCTIONAL MOBILITY, BALANCE, GAIT, AND ENDURANCE: ICD-10-CM

## 2022-12-16 DIAGNOSIS — R68.89 DECREASED ACTIVITY TOLERANCE: ICD-10-CM

## 2022-12-16 DIAGNOSIS — M25.651 IMPAIRED RANGE OF MOTION OF RIGHT HIP: ICD-10-CM

## 2022-12-16 PROCEDURE — 97110 THERAPEUTIC EXERCISES: CPT | Performed by: PHYSICAL THERAPIST

## 2022-12-16 PROCEDURE — 97162 PT EVAL MOD COMPLEX 30 MIN: CPT | Performed by: PHYSICAL THERAPIST

## 2022-12-16 NOTE — PROGRESS NOTES
OF INJURY:   2 - 3 hx of R hip pain w hx of falls prior to MARCELL. Date symptoms began: Fri. 11/18/22  Lupe Sada of condition: Recent onset (initial onset within last 3 months)  Primary cause of current episode: Post-surgical  How did symptoms start: MARCELL  Describe current symptoms: Stiff R hip; aching, spasms. Received previous outpatient physical therapy for this problem? Yes; Number of therapy visits in the last 90 days: 8    PAIN ASSESSMENT:  Pain location: R anterior thigh pain    Current (0-10 pain scale): 0/10  Average Pain/symptom intensity (0-10 scale)  Last 24 hours: 2/10  Last week (1-7 days): 2/10  How often do you feel symptoms? Occasionally (26-50%)  Description: aching and spasm  Aggravating factors: Movement related turning LE a certain way  Alleviating factors: Rest, modified activities, Tylenol. NEURO SCREEN: denies numbness, tingling, and radiating pain    SOCIAL/FUNCTIONAL HISTORY:  How would you rate your overall health? very good  Pt lives with independent spouse in a(n) 1 story house with entry steps. Current DME: straight cane  Work Status: Retired   Sleep: normal  PLOF & Social Hx/Interests: Independent and active without physical limitations and participated in senior Olympics/sports  Current level of function: modified independent with cane    FUNCTIONAL OUTCOME QUESTIONNARE: HOS: 27/68= 40% function     DIAGNOSTIC EXAMS (per chart review): 12/8/22: Findings:   X-rays are viewed which show total hip replacement in place on the right side. All hardware appears to be stable compared to immediate postoperative films. The acetabular component appears to be in good position, no evidence of loosening. Anteversion and inclination angle appear to be within acceptable criteria. The stem appears to be appropriately sized without any evidence of subsidence. No evidence of proximal femoral periprosthetic fracture. Hip is reduced.    Impression: Normal Xray after total hip replacement    PATIENT STATED GOALS: Get back to playing senior softball and the senior games and to be able to walk proper again    OBJECTIVE     OBSERVATION:   POSTURE: The pelvis is level, LE alignment is symmetrical in the frontal and sagittal planes. SKIN INTEGRITY: Incision well healed   ATROPHY: R glute and thigh  PALPATION: Tender over the posterolateral R hip. HOS FUNCTIONAL QUESTIONNAIRE 12/16/22   RAW SCORE 27/68   % FUNCTION 40   % DYSFUNCTION/LIMITATION 60     PAIN RATING (0 - 10) 12/16/22    PRE 2    POST       LEFT (NON - INVOLVED) LQ AROM (?) RIGHT (INVOLVED)   NT HIP ER NT   NT HIP IR NT   105 HIP FLEXION 85   20 HIP ABDUCTION 20   NT HIP EXTENSION NT   130 KNEE FLEXION 105     LEFT (NON - INVOLVED) LQ MMT (0 - 5) RIGHT (INVOLVED)    HIP FLEXION     HIP ABDUCTION     HIP EXTENSION     HIP ADDUCTION     HIP ER     HIP IR     KNEE EXTENSION     KNEE FLEXION      FUNCTION:   Gait: Independent w SPC. Mild antalgia noted. Strides are shortened. Stair management: Negotiates stairs w a step to pattern ascending w the non - involved LE and descending w the involved LE. Sit to stand: Independent w equal use of both LEs. Modest use of UEs to assist in transitions to/from standing. WBing and performance. Today's treatment consisted of an initial evaluation f/b:   PATIENT EDUCATION: Re the findings of the PT evaluation and the PT POC; the principles of symptom limited activity and exercise and the use of elevation and cold for pain and swelling control. Instruction in/practice of home exercise program requiring verbal and tactile cues by PT. Patient provided w exercise illustrations through [address] [place] [address]. [address] - THERAPEUTIC EXERCISE: 30 MIN. To address pain and deficits related to ROM and mm function - force production, endurance, neuromuscular coordination for completing ADLs. Access Code: [email or web address]  URL: [email or web address]  Date: 12/16/2022  Prepared by: Loretta Gautam    Exercises  Supine Heel ADLs  (16349) Manual therapy techniques to improve joint and/or soft tissue mobility, ROM, and function as well as helping to decrease pain/spasms and swelling  Home exercise program (HEP) development  Modalities prn to address pain, spasms, and swelling: (87019/) Electrical stimulation- unattended  (53375) Vasopneumatic compression  (21795) Hot/cold pack    The referring physician has reviewed and approved this evaluation and plan of care as noted by the electronic signature attached to note. GOALS     SHORT - TERM GOALS TO BE ATTAINED BY 1/13/23:  Patient will voice understanding of symptom limited activity principle. Patient will report maintaining pain ? 4/10 w symptom limited activity, use of modalities of cold and/or heat, positioning, meds and exercise. Patient will report compliance w home program of exercise, cold and elevation. Patient will demonstrate independent skill w initial HEP. Improve HOS score to ? 50% limitation. LONG - TERM GOALS TO BE ATTAINED BY 3/10/23:  Improve HOS score to ? 20% limitation. Patient will return to normal sleep pattern re quality and duration. Patient will demonstrate independent skill w maintenance exercise program.  Patient will attain asymptomatic hip function that allows patient to perform ADLs involving FBing, lifting, carrying, reaching and twisting. Patient will tolerate sustained position of sitting for ? 60 min for self - care/dressing/grooming, driving, travel, work, family or social activities. Patient will tolerate sustained position of standing for ? 45 min for self - care/dressing/grooming, preparing meals/cleaning home, work, family or social activities. Patient will tolerate walking for as necessary for travel, work, family or social activities. Patient will be able to lift from the floor 20 lbs for travel, work, family or social activities.   Patient will attain hip function - ROM, mm strength - endurance, coordination of movement - that allows patient to resume desired activities/attain patient goals of: resuming training for the [employer].     Ben Marquis

## 2022-12-21 ENCOUNTER — OFFICE VISIT (OUTPATIENT)
Dept: ORTHOPEDIC SURGERY | Age: 87
End: 2022-12-21
Payer: MEDICARE

## 2022-12-21 DIAGNOSIS — M25.559 HIP PAIN: ICD-10-CM

## 2022-12-21 DIAGNOSIS — Z96.641 STATUS POST RIGHT HIP REPLACEMENT: Primary | ICD-10-CM

## 2022-12-21 DIAGNOSIS — M25.651 IMPAIRED RANGE OF MOTION OF RIGHT HIP: ICD-10-CM

## 2022-12-21 DIAGNOSIS — Z74.09 IMPAIRED FUNCTIONAL MOBILITY, BALANCE, GAIT, AND ENDURANCE: ICD-10-CM

## 2022-12-21 DIAGNOSIS — R68.89 DECREASED ACTIVITY TOLERANCE: ICD-10-CM

## 2022-12-21 DIAGNOSIS — R29.898 IMPAIRED STRENGTH OF HIP MUSCLES: ICD-10-CM

## 2022-12-21 DIAGNOSIS — Z78.9 DECREASED ACTIVITIES OF DAILY LIVING (ADL): ICD-10-CM

## 2022-12-21 PROCEDURE — 97110 THERAPEUTIC EXERCISES: CPT | Performed by: PHYSICAL THERAPIST

## 2022-12-21 NOTE — PROGRESS NOTES
[facility]  [address],[address] [address]  Dept: [phone]    PHYSICAL THERAPY DAILY NOTE     DATE of EVAL: 12/16/22    POC ENDS: 3/10/23    PT SESSION: 2 of 20    DATE of LAST PROGRESS NOTE: ---    DATE of SURGERY: Fri. 11/18/22  WEEKS POST OP: 4+    TOTAL TIMED PROCEDURE CODES: *** MIN. TOTAL TIME: *** MIN. REFERRING PROVIDER: Enedina Olszewski, MD  DIAGNOSIS:     ICD-10-CM    1. Status post right hip replacement  Z96.641       2. Hip pain  M25.559       3. Impaired range of motion of right hip  M25.651       4. Impaired strength of hip muscles  R29.898       5. Impaired functional mobility, balance, gait, and endurance  Z74.09       6. Decreased activities of daily living (ADL)  Z78.9       7. Decreased activity tolerance  R68.89          THERAPY PRECAUTIONS:None  CO - MORBIDITIES AFFECTING PLAN OF CARE: None    PERTINENT MEDICAL HISTORY     Past Medical History:   Diagnosis Date    Arthritis     mild    Arthropathy, unspecified, site unspecified 7/8/2015    Back pain     Chronic renal insufficiency     pt denies 10/25/22    Diverticulosis of large intestine without diverticulitis 2016    Essential hypertension, benign 7/8/2015    managed with medication    Full dentures     GERD (gastroesophageal reflux disease)     hx-no medication at this time (noted 10/25/22)    Hand pain, left     Hip pain, right     Hx of colonic polyps 2016    adenoma    Osteoarthritis of finger of left hand     Other abnormal glucose 7/8/2015    Other and unspecified hyperlipidemia 7/8/2015    Shoulder pain         HISTORY RELATED TO PRESENT COMPLAINT      CHIEF COMPLAINTS: Stiff R hip. Cant bend over to tie shoes, etc.     HISTORY OF INJURY:   2 - 3 hx of R hip pain w hx of falls prior to MARCELL.   Date symptoms began: Fri. 11/18/22  Thadhene Memory of condition: Recent onset (initial onset within last 3 months)  Primary cause of current episode: Post-surgical  How did symptoms start: MARCELL  Describe current symptoms: Stiff R hip; aching, spasms. Received previous outpatient physical therapy for this problem? Yes; Number of therapy visits in the last 90 days: 8     PAIN ASSESSMENT:  Pain location: R anterior thigh pain    Current (0-10 pain scale): 0/10  Average Pain/symptom intensity (0-10 scale)  Last 24 hours: 2/10  Last week (1-7 days): 2/10  How often do you feel symptoms? Occasionally (26-50%)  Description: aching and spasm  Aggravating factors: Movement related turning LE a certain way  Alleviating factors: Rest, modified activities, Tylenol. NEURO SCREEN: denies numbness, tingling, and radiating pain     SOCIAL/FUNCTIONAL HISTORY:  How would you rate your overall health? very good  Pt lives with independent spouse in a(n) 1 story house with entry steps. 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 All hardware appears to be stable compared to immediate postoperative films. The acetabular component appears to be in good position, no evidence of loosening. Anteversion and inclination angle appear to be within acceptable criteria. The stem appears to be appropriately sized without any evidence of subsidence. No evidence of proximal femoral periprosthetic fracture. Hip is reduced. Impression: Normal Xray after total hip replacement     PATIENT STATED GOALS: Get back to playing senior softball and the senior games and to be able to walk proper again    SUBJECTIVE     Pt reports:  I am doing so so.   The hip is sore, stiff     OBJECTIVE     FINDINGS:  OBSERVATION:   POSTURE: The pelvis is level, LE alignment is symmetrical in the frontal and sagittal planes. SKIN INTEGRITY: Incision well healed           ATROPHY: R glute and thigh  PALPATION: Tender over the posterolateral R hip. HOS FUNCTIONAL QUESTIONNAIRE 12/16/22   RAW SCORE 27/68   % FUNCTION 40   % DYSFUNCTION/LIMITATION 60      PAIN RATING (0 - 10) 12/16/22     PRE 2     POST          LEFT (NON - INVOLVED) LQ AROM (?) RIGHT (INVOLVED)   NT HIP ER NT   NT HIP IR NT   105 HIP FLEXION 85   20 HIP ABDUCTION 20   NT HIP EXTENSION NT   130 KNEE FLEXION 105      LEFT (NON - INVOLVED) LQ MMT (0 - 5) RIGHT (INVOLVED)   5  HIP FLEXION 5    5 HIP ABDUCTION 2    5 HIP EXTENSION  ***    5 KNEE EXTENSION  5    5 KNEE FLEXION  5      TREATMENT PROVIDED TODAY:  PATIENT EDUCATION: Reviewed initial POC including modification of activities and the use of heat and/or cold. Reviewed initial HEP w patient demonstrating each exercise and PT correcting technique as needed w redemonstration, verbal or tactile cueing. PT answering any patient questions. Exercise illustrations provided by Kyle sIrael. [id number] - THERAPEUTIC EXERCISE: *** MIN. To address pain and deficits related to ROM and mm function - force production, endurance, neuromuscular coordination for completing ADLs. EXERCISES:  Stationary bike (seat position/intensity/duration): 6/2/5  PT directed stretching of hip:  -ER  -abduction  - extension  -flexion (mindful of healing restrictions)  Review of initial hep:  Heel slides 2 min  Bridging 10  Bent knee fall out 15    Prone:  Knee flexion  Hip ER stretch  Hip extension stretch    Supine:   Anterior hip/rectus femoris stretch    Access Code: [email or web address]  URL: [email or web address]  Date: 12/16/2022  Prepared by: Avril Morton     Exercises  Supine Heel Slide - 2 x daily - 6 x weekly - 1 sets - 1 - 2 minutes duration:  Supine Bridge - 2 x daily - 6 x weekly - 1 sets - 5 reps  Supine Single Bent Knee Fallout - 2 x daily - 6 x weekly - 1 sets - 10 reps  Supine Hip Abduction on Slider - 2 x daily - 6 x weekly - 1 sets - 10 reps  Standing Hip Abduction AROM - 2 x daily - 6 x weekly - 1 sets - 10 reps  Squat with Counter Support - 2 x daily - 6 x weekly - 1 sets - 10 reps    ASSESSMENT     ***     PLAN     ***       GOALS      SHORT - TERM GOALS TO BE ATTAINED BY 1/13/23:  Patient will voice understanding of symptom limited activity principle. Patient will report maintaining pain ? 4/10 w symptom limited activity, use of modalities of cold and/or heat, positioning, meds and exercise. Patient will report compliance w home program of exercise, cold and elevation. Patient will demonstrate independent skill w initial HEP. Improve HOS score to ? 50% limitation. LONG - TERM GOALS TO BE ATTAINED BY 3/10/23:  Improve HOS score to ? 20% limitation. Patient will return to normal sleep pattern re quality and duration. Patient will demonstrate independent skill w maintenance exercise program.  Patient will attain asymptomatic hip function that allows patient to perform ADLs involving FBing, lifting, carrying, reaching and twisting. Patient will tolerate sustained position of sitting for ? 60 min for self - care/dressing/grooming, driving, travel, work, family or social activities. Patient will tolerate sustained position of standing for ? 45 min for self - care/dressing/grooming, preparing meals/cleaning home, work, family or social activities. Patient will tolerate walking for as necessary for travel, work, family or social activities. Patient will be able to lift from the floor 20 lbs for travel, work, family or social activities. Patient will attain hip function - ROM, mm strength - endurance, coordination of movement - that allows patient to resume desired activities/attain patient goals of: resuming training for the [employer].      Dennis Clemons

## 2022-12-23 ENCOUNTER — OFFICE VISIT (OUTPATIENT)
Dept: ORTHOPEDIC SURGERY | Age: 87
End: 2022-12-23

## 2022-12-23 DIAGNOSIS — Z78.9 DECREASED ACTIVITIES OF DAILY LIVING (ADL): ICD-10-CM

## 2022-12-23 DIAGNOSIS — R68.89 DECREASED ACTIVITY TOLERANCE: ICD-10-CM

## 2022-12-23 DIAGNOSIS — R29.898 IMPAIRED STRENGTH OF HIP MUSCLES: ICD-10-CM

## 2022-12-23 DIAGNOSIS — M25.559 HIP PAIN: ICD-10-CM

## 2022-12-23 DIAGNOSIS — Z74.09 IMPAIRED FUNCTIONAL MOBILITY, BALANCE, GAIT, AND ENDURANCE: ICD-10-CM

## 2022-12-23 DIAGNOSIS — Z96.641 STATUS POST RIGHT HIP REPLACEMENT: Primary | ICD-10-CM

## 2022-12-23 DIAGNOSIS — M25.651 IMPAIRED RANGE OF MOTION OF RIGHT HIP: ICD-10-CM

## 2022-12-23 NOTE — PROGRESS NOTES
[address] [facility]  [address] [facility] [place] [address]  Dept: [phone]    PHYSICAL THERAPY DAILY NOTE     DATE of EVAL: 12/16/22    POC ENDS: 3/10/23    PT SESSION: 3 of 20    DATE of LAST PROGRESS NOTE: ---    DATE of SURGERY: Fri. 11/18/22  WEEKS POST OP: 5    TOTAL TIMED PROCEDURE CODES: 39 MIN. TOTAL TIME: 45 MIN. REFERRING PROVIDER: Kunal Piña MD  DIAGNOSIS:     ICD-10-CM    1. Status post right hip replacement  Z96.641       2. Hip pain  M25.559       3. Impaired range of motion of right hip  M25.651       4. Impaired strength of hip muscles  R29.898       5. Impaired functional mobility, balance, gait, and endurance  Z74.09       6. Decreased activities of daily living (ADL)  Z78.9       7. Decreased activity tolerance  R68.89            THERAPY PRECAUTIONS:None  CO - MORBIDITIES AFFECTING PLAN OF CARE: None    PERTINENT MEDICAL HISTORY     Past Medical History:   Diagnosis Date    Arthritis     mild    Arthropathy, unspecified, site unspecified 7/8/2015    Back pain     Chronic renal insufficiency     pt denies 10/25/22    Diverticulosis of large intestine without diverticulitis 2016    Essential hypertension, benign 7/8/2015    managed with medication    Full dentures     GERD (gastroesophageal reflux disease)     hx-no medication at this time (noted 10/25/22)    Hand pain, left     Hip pain, right     Hx of colonic polyps 2016    adenoma    Osteoarthritis of finger of left hand     Other abnormal glucose 7/8/2015    Other and unspecified hyperlipidemia 7/8/2015    Shoulder pain         HISTORY RELATED TO PRESENT COMPLAINT      CHIEF COMPLAINTS: Stiff R hip. Cant bend over to tie shoes, etc.     HISTORY OF INJURY:   2 - 3 hx of R hip pain w hx of falls prior to MARCELL.   Date symptoms began: Fri. 11/18/22  Moises Judge of condition: Recent onset (initial onset within last 3 months)  Primary cause of current episode: Post-surgical  How did symptoms start: MARCELL  Describe current symptoms: Stiff R hip; aching, spasms. Received previous outpatient physical therapy for this problem? Yes; Number of therapy visits in the last 90 days: 8     PAIN ASSESSMENT:  Pain location: R anterior thigh pain    Current (0-10 pain scale): 0/10  Average Pain/symptom intensity (0-10 scale)  Last 24 hours: 2/10  Last week (1-7 days): 2/10  How often do you feel symptoms? Occasionally (26-50%)  Description: aching and spasm  Aggravating factors: Movement related turning LE a certain way  Alleviating factors: Rest, modified activities, Tylenol. NEURO SCREEN: denies numbness, tingling, and radiating pain     SOCIAL/FUNCTIONAL HISTORY:  How would you rate your overall health? very good  Pt lives with independent spouse in a(n) 1 story house with entry steps. 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 All hardware appears to be stable compared to immediate postoperative films. The acetabular component appears to be in good position, no evidence of loosening. Anteversion and inclination angle appear to be within acceptable criteria. The stem appears to be appropriately sized without any evidence of subsidence. No evidence of proximal femoral periprosthetic fracture. Hip is reduced. Impression: Normal Xray after total hip replacement     PATIENT STATED GOALS: Get back to playing senior softball and the senior games and to be able to walk proper again    SUBJECTIVE     Pt reports:  Feeling much better. Some stiffness and a little discomfort but would not call it pain.     OBJECTIVE FINDINGS:  OBSERVATION:   POSTURE: The pelvis is level, LE alignment is symmetrical in the frontal and sagittal planes. SKIN INTEGRITY: Incision well healed           ATROPHY: R glute and thigh  PALPATION: Tender over the posterolateral R hip. HOS FUNCTIONAL QUESTIONNAIRE 12/16/22   RAW SCORE 27/68   % FUNCTION 40   % DYSFUNCTION/LIMITATION 60      PAIN RATING (0 - 10) 12/16/22 12/23/22    PRE 2  0   POST          LEFT (NON - INVOLVED) LQ AROM (?) RIGHT (INVOLVED)   NT HIP ER NT   NT HIP IR NT   105 HIP FLEXION 85   20 HIP ABDUCTION 20   NT HIP EXTENSION NT   130 KNEE FLEXION 105      LEFT (NON - INVOLVED) LQ MMT (0 - 5) RIGHT (INVOLVED)   5  HIP FLEXION 5    5 HIP ABDUCTION 2    5 HIP EXTENSION  NT    5 KNEE EXTENSION  5    5 KNEE FLEXION  5      TREATMENT PROVIDED TODAY:  PATIENT EDUCATION: PT supervised all exercises utilizing verbal and tactile cues as needed to correct technique. [id number] - THERAPEUTIC EXERCISE: 45 MIN. To address pain and deficits related to ROM and mm function - force production, endurance, neuromuscular coordination for completing ADLs. EXERCISES:  Stationary bike (seat position/intensity/duration): 6/2/5  PT directed stretching of hip:  -ER  - abduction  - flexion (mindful of healing restrictions)      Supine:   Anterior hip/rectus femoris stretch  Heel slides 2 min  Bridging 2 - 15  Bent knee fall out 2 min  Hip abduction 2 min - supine   L/R hip extension -  standing FBnt over plinth  R front step ups:  Lateral stepping:  Monster walk:  Diagonal stepping:     Access Code: [email or web address]  URL: [email or web address]  Date: 12/16/2022  Prepared by: Junious Hove     Exercises  Supine Heel Slide - 2 x daily - 6 x weekly - 1 sets - 1 - 2 minutes duration:  Supine Bridge - 2 x daily - 6 x weekly - 1 sets - 5 reps  Supine Single Bent Knee Fallout - 2 x daily - 6 x weekly - 1 sets - 10 reps  Supine Hip Abduction on Slider - 2 x daily - 6 x weekly - 1 sets - 10 reps  Standing Hip Abduction AROM - 2 x daily - 6 x weekly - 1 sets - 10 reps  Squat with Counter Support - 2 x daily - 6 x weekly - 1 sets - 10 reps    ASSESSMENT     Today's session focused on improving mm strength and function of the hip and LE musculature    The following impairments are present:  R hip pain  Limited R hip ROM - P & A  Hip strength  Balance - static & dynamic    These impairments result in the following activity limitations:  [place]bing ADLs involving - standing, walking, squatting, kneeling, lifting, carrying    PLAN     Evaluate response to session. Progress TE and MT to restore hip function       GOALS      SHORT - TERM GOALS TO BE ATTAINED BY 1/13/23:  Patient will voice understanding of symptom limited activity principle. Patient will report maintaining pain ? 4/10 w symptom limited activity, use of modalities of cold and/or heat, positioning, meds and exercise. Patient will report compliance w home program of exercise, cold and elevation. Patient will demonstrate independent skill w initial HEP. Improve HOS score to ? 50% limitation. LONG - TERM GOALS TO BE ATTAINED BY 3/10/23:  Improve HOS score to ? 20% limitation. Patient will return to normal sleep pattern re quality and duration. Patient will demonstrate independent skill w maintenance exercise program.  Patient will attain asymptomatic hip function that allows patient to perform ADLs involving FBing, lifting, carrying, reaching and twisting. Patient will tolerate sustained position of sitting for ? 60 min for self - care/dressing/grooming, driving, travel, work, family or social activities. Patient will tolerate sustained position of standing for ? 45 min for self - care/dressing/grooming, preparing meals/cleaning home, work, family or social activities. Patient will tolerate walking for as necessary for travel, work, family or social activities.   Patient will be able to lift from the floor 20 lbs for travel, work, family or social activities. Patient will attain hip function - ROM, mm strength - endurance, coordination of movement - that allows patient to resume desired activities/attain patient goals of: resuming training for the [employer].      [address] [facility]

## 2022-12-23 NOTE — PROGRESS NOTES
[address] [facility]  [address] [facility],[address] [address]  Dept: [phone]    PHYSICAL THERAPY DAILY NOTE     DATE of EVAL: 12/16/22    POC ENDS: 3/10/23    PT SESSION: 2 of 20    DATE of LAST PROGRESS NOTE: ---    DATE of SURGERY: Fri. 11/18/22  WEEKS POST OP: 4+    TOTAL TIMED PROCEDURE CODES: 39 MIN. TOTAL TIME: 45 MIN. REFERRING PROVIDER: rEica Griffith MD  DIAGNOSIS:     ICD-10-CM    1. Status post right hip replacement  Z96.641       2. Hip pain  M25.559       3. Impaired range of motion of right hip  M25.651       4. Impaired strength of hip muscles  R29.898       5. Impaired functional mobility, balance, gait, and endurance  Z74.09       6. Decreased activities of daily living (ADL)  Z78.9       7. Decreased activity tolerance  R68.89          THERAPY PRECAUTIONS:None  CO - MORBIDITIES AFFECTING PLAN OF CARE: None    PERTINENT MEDICAL HISTORY     Past Medical History:   Diagnosis Date    Arthritis     mild    Arthropathy, unspecified, site unspecified 7/8/2015    Back pain     Chronic renal insufficiency     pt denies 10/25/22    Diverticulosis of large intestine without diverticulitis 2016    Essential hypertension, benign 7/8/2015    managed with medication    Full dentures     GERD (gastroesophageal reflux disease)     hx-no medication at this time (noted 10/25/22)    Hand pain, left     Hip pain, right     Hx of colonic polyps 2016    adenoma    Osteoarthritis of finger of left hand     Other abnormal glucose 7/8/2015    Other and unspecified hyperlipidemia 7/8/2015    Shoulder pain         HISTORY RELATED TO PRESENT COMPLAINT      CHIEF COMPLAINTS: Stiff R hip. Cant bend over to tie shoes, etc.     HISTORY OF INJURY:   2 - 3 hx of R hip pain w hx of falls prior to MARCELL.   Date symptoms began: Fri. 11/18/22  Nehemias Williamson of condition: Recent onset (initial onset within last 3 months)  Primary cause of current episode: Post-surgical  How did symptoms start: MARCELL  Describe current symptoms: Stiff R hip; aching, spasms. Received previous outpatient physical therapy for this problem? Yes; Number of therapy visits in the last 90 days: 8     PAIN ASSESSMENT:  Pain location: R anterior thigh pain    Current (0-10 pain scale): 0/10  Average Pain/symptom intensity (0-10 scale)  Last 24 hours: 2/10  Last week (1-7 days): 2/10  How often do you feel symptoms? Occasionally (26-50%)  Description: aching and spasm  Aggravating factors: Movement related turning LE a certain way  Alleviating factors: Rest, modified activities, Tylenol. NEURO SCREEN: denies numbness, tingling, and radiating pain     SOCIAL/FUNCTIONAL HISTORY:  How would you rate your overall health? very good  Pt lives with independent spouse in a(n) 1 story house with entry steps. 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 All hardware appears to be stable compared to immediate postoperative films. The acetabular component appears to be in good position, no evidence of loosening. Anteversion and inclination angle appear to be within acceptable criteria. The stem appears to be appropriately sized without any evidence of subsidence. No evidence of proximal femoral periprosthetic fracture. Hip is reduced. Impression: Normal Xray after total hip replacement     PATIENT STATED GOALS: Get back to playing senior softball and the senior games and to be able to walk proper again    SUBJECTIVE     Pt reports:  I am doing so so.   The hip is sore, stiff     OBJECTIVE     FINDINGS:  OBSERVATION:   POSTURE: The pelvis is level, LE alignment is symmetrical in the frontal and sagittal planes. SKIN INTEGRITY: Incision well healed           ATROPHY: R glute and thigh  PALPATION: Tender over the posterolateral R hip. HOS FUNCTIONAL QUESTIONNAIRE 12/16/22   RAW SCORE 27/68   % FUNCTION 40   % DYSFUNCTION/LIMITATION 60      PAIN RATING (0 - 10) 12/16/22     PRE 2     POST          LEFT (NON - INVOLVED) LQ AROM (?) RIGHT (INVOLVED)   NT HIP ER NT   NT HIP IR NT   105 HIP FLEXION 85   20 HIP ABDUCTION 20   NT HIP EXTENSION NT   130 KNEE FLEXION 105      LEFT (NON - INVOLVED) LQ MMT (0 - 5) RIGHT (INVOLVED)   5  HIP FLEXION 5    5 HIP ABDUCTION 2    5 HIP EXTENSION  NT    5 KNEE EXTENSION  5    5 KNEE FLEXION  5      TREATMENT PROVIDED TODAY:  PATIENT EDUCATION: Reviewed initial POC including modification of activities and the use of heat and/or cold. Reviewed initial HEP w patient demonstrating each exercise and PT correcting technique as needed w redemonstration, verbal or tactile cueing. PT answering any patient questions. Exercise illustrations provided by [address] [place] [address]. [id number] - THERAPEUTIC EXERCISE: 45 MIN. To address pain and deficits related to ROM and mm function - force production, endurance, neuromuscular coordination for completing ADLs. EXERCISES:  Stationary bike (seat position/intensity/duration): 6/2/5  PT directed stretching of hip:  -ER  -abduction  - extension  -flexion (mindful of healing restrictions)  Review of initial hep:  Heel slides 2 min  Bridging 10  Bent knee fall out 15    Prone:  Knee flexion  Hip ER stretch  Hip extension stretch    Supine:   Anterior hip/rectus femoris stretch    Access Code: [email or web address]  URL: [email or web address]  Date: 12/16/2022  Prepared by: Gina Martínez     Exercises  Supine Heel Slide - 2 x daily - 6 x weekly - 1 sets - 1 - 2 minutes duration:  Supine Bridge - 2 x daily - 6 x weekly - 1 sets - 5 reps  Supine Single Bent Knee Fallout - 2 x daily - 6 x weekly - 1 sets - 10 reps  Supine Hip Abduction on Slider - 2 x daily - 6 x weekly - 1 sets - 10 reps  Standing Hip Abduction AROM - 2 x daily - 6 x weekly - 1 sets - 10 reps  Squat with Counter Support - 2 x daily - 6 x weekly - 1 sets - 10 reps    ASSESSMENT     Today's session focused on improving hip motion and strength. PLAN     Evaluate response to session. Progress TE and MT to restore hip function       GOALS      SHORT - TERM GOALS TO BE ATTAINED BY 1/13/23:  Patient will voice understanding of symptom limited activity principle. Patient will report maintaining pain ? 4/10 w symptom limited activity, use of modalities of cold and/or heat, positioning, meds and exercise. Patient will report compliance w home program of exercise, cold and elevation. Patient will demonstrate independent skill w initial HEP. Improve HOS score to ? 50% limitation. LONG - TERM GOALS TO BE ATTAINED BY 3/10/23:  Improve HOS score to ? 20% limitation. Patient will return to normal sleep pattern re quality and duration. Patient will demonstrate independent skill w maintenance exercise program.  Patient will attain asymptomatic hip function that allows patient to perform ADLs involving FBing, lifting, carrying, reaching and twisting. Patient will tolerate sustained position of sitting for ? 60 min for self - care/dressing/grooming, driving, travel, work, family or social activities. Patient will tolerate sustained position of standing for ? 45 min for self - care/dressing/grooming, preparing meals/cleaning home, work, family or social activities. Patient will tolerate walking for as necessary for travel, work, family or social activities. Patient will be able to lift from the floor 20 lbs for travel, work, family or social activities.   Patient will attain hip function - ROM, mm strength - endurance, coordination of movement - that allows patient to resume desired activities/attain patient goals of: resuming training for the [employer]

## 2022-12-29 ENCOUNTER — OFFICE VISIT (OUTPATIENT)
Dept: ORTHOPEDIC SURGERY | Age: 87
End: 2022-12-29
Payer: MEDICARE

## 2022-12-29 DIAGNOSIS — R68.89 DECREASED ACTIVITY TOLERANCE: ICD-10-CM

## 2022-12-29 DIAGNOSIS — M25.651 IMPAIRED RANGE OF MOTION OF RIGHT HIP: ICD-10-CM

## 2022-12-29 DIAGNOSIS — M25.559 HIP PAIN: ICD-10-CM

## 2022-12-29 DIAGNOSIS — R29.898 IMPAIRED STRENGTH OF HIP MUSCLES: ICD-10-CM

## 2022-12-29 DIAGNOSIS — Z78.9 DECREASED ACTIVITIES OF DAILY LIVING (ADL): ICD-10-CM

## 2022-12-29 DIAGNOSIS — Z96.641 STATUS POST RIGHT HIP REPLACEMENT: Primary | ICD-10-CM

## 2022-12-29 DIAGNOSIS — Z74.09 IMPAIRED FUNCTIONAL MOBILITY, BALANCE, GAIT, AND ENDURANCE: ICD-10-CM

## 2022-12-29 PROCEDURE — 97110 THERAPEUTIC EXERCISES: CPT | Performed by: PHYSICAL THERAPIST

## 2022-12-29 NOTE — PROGRESS NOTES
[facility]  [address] [facility] [address]  Dept: [phone]    PHYSICAL THERAPY DAILY NOTE     DATE of EVAL: 12/16/22    POC ENDS: 3/10/23    PT SESSION: 4 of 20    DATE of LAST PROGRESS NOTE: ---    DATE of SURGERY: Fri. 11/18/22  WEEKS POST OP: 5+    TOTAL TIMED PROCEDURE CODES: 39 MIN. TOTAL TIME: 45 MIN. REFERRING PROVIDER: Aiden García MD  DIAGNOSIS:     ICD-10-CM    1. Status post right hip replacement  Z96.641       2. Hip pain  M25.559       3. Impaired range of motion of right hip  M25.651       4. Impaired strength of hip muscles  R29.898       5. Impaired functional mobility, balance, gait, and endurance  Z74.09       6. Decreased activities of daily living (ADL)  Z78.9       7. Decreased activity tolerance  R68.89            THERAPY PRECAUTIONS:None  CO - MORBIDITIES AFFECTING PLAN OF CARE: None    PERTINENT MEDICAL HISTORY     Past Medical History:   Diagnosis Date    Arthritis     mild    Arthropathy, unspecified, site unspecified 7/8/2015    Back pain     Chronic renal insufficiency     pt denies 10/25/22    Diverticulosis of large intestine without diverticulitis 2016    Essential hypertension, benign 7/8/2015    managed with medication    Full dentures     GERD (gastroesophageal reflux disease)     hx-no medication at this time (noted 10/25/22)    Hand pain, left     Hip pain, right     Hx of colonic polyps 2016    adenoma    Osteoarthritis of finger of left hand     Other abnormal glucose 7/8/2015    Other and unspecified hyperlipidemia 7/8/2015    Shoulder pain         HISTORY RELATED TO PRESENT COMPLAINT      CHIEF COMPLAINTS: Stiff R hip. Cant bend over to tie shoes, etc.     HISTORY OF INJURY:   2 - 3 hx of R hip pain w hx of falls prior to MARCELL.   Date symptoms began: Fri. 11/18/22  Manuel Math of condition: Recent onset (initial onset within last 3 months)  Primary cause of current episode: Post-surgical  How did symptoms start: MARCELL  Describe current symptoms: Stiff R hip; aching, spasms. Received previous outpatient physical therapy for this problem? Yes; Number of therapy visits in the last 90 days: 8     PAIN ASSESSMENT:  Pain location: R anterior thigh pain    Current (0-10 pain scale): 0/10  Average Pain/symptom intensity (0-10 scale)  Last 24 hours: 2/10  Last week (1-7 days): 2/10  How often do you feel symptoms? Occasionally (26-50%)  Description: aching and spasm  Aggravating factors: Movement related turning LE a certain way  Alleviating factors: Rest, modified activities, Tylenol. NEURO SCREEN: denies numbness, tingling, and radiating pain     SOCIAL/FUNCTIONAL HISTORY:  How would you rate your overall health? very good  Pt lives with independent spouse in a(n) 1 story house with entry steps. 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 All hardware appears to be stable compared to immediate postoperative films. The acetabular component appears to be in good position, no evidence of loosening. Anteversion and inclination angle appear to be within acceptable criteria. The stem appears to be appropriately sized without any evidence of subsidence. No evidence of proximal femoral periprosthetic fracture. Hip is reduced. Impression: Normal Xray after total hip replacement     PATIENT STATED GOALS: Get back to playing senior softball and the senior games and to be able to walk proper again    SUBJECTIVE     Pt reports:  Felt good after last session.    Later that day and the following day I had some mild discomfort ( ? 1/10) and sense of stiffness. Libra Palomino did fine overall. OBJECTIVE     FINDINGS:  OBSERVATION:   POSTURE: The pelvis is level, LE alignment is symmetrical in the frontal and sagittal planes. SKIN INTEGRITY: Incision well healed           ATROPHY: R glute and thigh  PALPATION: Tender over the posterolateral R hip. HOS FUNCTIONAL QUESTIONNAIRE 22   RAW SCORE 27/68   % FUNCTION 40   % DYSFUNCTION/LIMITATION 60      PAIN RATING (0 - 10) 22   PRE 2  0 0   POST           LEFT (NON - INVOLVED) LQ AROM (?) RIGHT (INVOLVED)   NT HIP ER NT   NT HIP IR NT   105 HIP FLEXION 85   20 HIP ABDUCTION 20   NT HIP EXTENSION NT   130 KNEE FLEXION 105      LEFT (NON - INVOLVED) LQ MMT (0 - 5) RIGHT (INVOLVED)   5  HIP FLEXION 5    5 HIP ABDUCTION 2    5 HIP EXTENSION  NT    5 KNEE EXTENSION  5    5 KNEE FLEXION  5      TREATMENT PROVIDED TODAY:  PATIENT EDUCATION: PT supervised all exercises utilizing verbal and tactile cues as needed to correct technique. 26542 - THERAPEUTIC EXERCISE: 45 MIN. To address pain and deficits related to ROM and mm function - force production, endurance, neuromuscular coordination for completing ADLs. EXERCISES:  Stationary bike (seat position/intensity/duration):     Standing:  R/L front step ups: 10  R/L hip extension -  standing FBnt over plinth: 2/10 - Lt Bl R: Loop just above the knees  Lateral steppin cycles - Lt Bl R: Loop just above the knees  Diagonal steppin cycle      PT directed stretching of hip:  -ER  - abduction  - flexion (mindful of healing restrictions)      Supine:   Bent knee fall out: 2/10 - Lt Bl R: Loop just above the knees  Bridging 2 - 15  Hip abduction 2 min - supine   Anterior hip/rectus femoris stretch - LE dropped off edge of plinth  Heel slides 2 min    Access Code: [email or web address]  URL: [email or web address]  Date: 2022  Prepared by: Veronika Hills     Exercises  Supine Heel Slide - 2 x daily - 6 x weekly - 1 sets - 1 - 2 minutes duration:  Supine Bridge - 2 x daily - 6 x weekly - 1 sets - 5 reps  Supine Single Bent Knee Fallout - 2 x daily - 6 x weekly - 1 sets - 10 reps  Supine Hip Abduction on Slider - 2 x daily - 6 x weekly - 1 sets - 10 reps  Standing Hip Abduction AROM - 2 x daily - 6 x weekly - 1 sets - 10 reps  Squat with Counter Support - 2 x daily - 6 x weekly - 1 sets - 10 reps    ASSESSMENT     Today's session focused on improving mm strength and function of the hip and LE musculature. No complaints post session. Post session patient does appear to ambulate more stiffly. Patient cautioned re post session soreness. The following impairments are present:  R hip discomfort  Limited R hip ROM - P & A  Hip strength  Balance - static & dynamic    These impairments result in the following activity limitations:  Wbing ADLs involving - standing, walking, squatting, kneeling, lifting, carrying    PLAN     Evaluate response to session. Progress TE and MT to restore hip function       GOALS      SHORT - TERM GOALS TO BE ATTAINED BY 1/13/23:  Patient will voice understanding of symptom limited activity principle. Patient will report maintaining pain ? 4/10 w symptom limited activity, use of modalities of cold and/or heat, positioning, meds and exercise. Patient will report compliance w home program of exercise, cold and elevation. Patient will demonstrate independent skill w initial HEP. Improve HOS score to ? 50% limitation. LONG - TERM GOALS TO BE ATTAINED BY 3/10/23:  Improve HOS score to ? 20% limitation. Patient will return to normal sleep pattern re quality and duration. Patient will demonstrate independent skill w maintenance exercise program.  Patient will attain asymptomatic hip function that allows patient to perform ADLs involving FBing, lifting, carrying, reaching and twisting. Patient will tolerate sustained position of sitting for ?  60 min for self - care/dressing/grooming, driving, travel, work, family or social activities. Patient will tolerate sustained position of standing for ? 45 min for self - care/dressing/grooming, preparing meals/cleaning home, work, family or social activities. Patient will tolerate walking for as necessary for travel, work, family or social activities. Patient will be able to lift from the floor 20 lbs for travel, work, family or social activities. Patient will attain hip function - ROM, mm strength - endurance, coordination of movement - that allows patient to resume desired activities/attain patient goals of: resuming training for the [employer].      [address] [facility]

## 2022-12-30 ENCOUNTER — OFFICE VISIT (OUTPATIENT)
Dept: ORTHOPEDIC SURGERY | Age: 87
End: 2022-12-30
Payer: MEDICARE

## 2022-12-30 DIAGNOSIS — R29.898 IMPAIRED STRENGTH OF HIP MUSCLES: ICD-10-CM

## 2022-12-30 DIAGNOSIS — M25.559 HIP PAIN: ICD-10-CM

## 2022-12-30 DIAGNOSIS — Z78.9 DECREASED ACTIVITIES OF DAILY LIVING (ADL): ICD-10-CM

## 2022-12-30 DIAGNOSIS — Z74.09 IMPAIRED FUNCTIONAL MOBILITY, BALANCE, GAIT, AND ENDURANCE: ICD-10-CM

## 2022-12-30 DIAGNOSIS — Z96.641 STATUS POST RIGHT HIP REPLACEMENT: Primary | ICD-10-CM

## 2022-12-30 DIAGNOSIS — R68.89 DECREASED ACTIVITY TOLERANCE: ICD-10-CM

## 2022-12-30 DIAGNOSIS — M25.651 IMPAIRED RANGE OF MOTION OF RIGHT HIP: ICD-10-CM

## 2022-12-30 PROCEDURE — 97110 THERAPEUTIC EXERCISES: CPT | Performed by: PHYSICAL THERAPIST

## 2022-12-30 NOTE — PROGRESS NOTES
[facility]  [address] [facility] [place],[address] [address]  Dept: [phone]    PHYSICAL THERAPY DAILY NOTE     DATE of EVAL: 12/16/22    POC ENDS: 3/10/23    PT SESSION: 5 of 20    DATE of LAST PROGRESS NOTE: ---    DATE of SURGERY: Fri. 11/18/22  WEEKS POST OP: 6    TOTAL TIMED PROCEDURE CODES: 40 MIN. TOTAL TIME: 40 MIN. REFERRING PROVIDER: Dakota Gary MD  DIAGNOSIS:     ICD-10-CM    1. Status post right hip replacement  Z96.641       2. Hip pain  M25.559       3. Impaired range of motion of right hip  M25.651       4. Impaired strength of hip muscles  R29.898       5. Impaired functional mobility, balance, gait, and endurance  Z74.09       6. Decreased activities of daily living (ADL)  Z78.9       7. Decreased activity tolerance  R68.89              THERAPY PRECAUTIONS:None  CO - MORBIDITIES AFFECTING PLAN OF CARE: None    PERTINENT MEDICAL HISTORY     Past Medical History:   Diagnosis Date    Arthritis     mild    Arthropathy, unspecified, site unspecified 7/8/2015    Back pain     Chronic renal insufficiency     pt denies 10/25/22    Diverticulosis of large intestine without diverticulitis 2016    Essential hypertension, benign 7/8/2015    managed with medication    Full dentures     GERD (gastroesophageal reflux disease)     hx-no medication at this time (noted 10/25/22)    Hand pain, left     Hip pain, right     Hx of colonic polyps 2016    adenoma    Osteoarthritis of finger of left hand     Other abnormal glucose 7/8/2015    Other and unspecified hyperlipidemia 7/8/2015    Shoulder pain         HISTORY RELATED TO PRESENT COMPLAINT      CHIEF COMPLAINTS: Stiff R hip. Cant bend over to tie shoes, etc.     HISTORY OF INJURY:   2 - 3 hx of R hip pain w hx of falls prior to MARCELL.   Date symptoms began: Fri. 11/18/22  Martyrell Mendoza of condition: Recent onset (initial onset within last 3 months)  Primary cause of current episode: Post-surgical  How did symptoms start: MARCELL  Describe current symptoms: Stiff R hip; aching, spasms. Received previous outpatient physical therapy for this problem? Yes; Number of therapy visits in the last 90 days: 8     PAIN ASSESSMENT:  Pain location: R anterior thigh pain    Current (0-10 pain scale): 0/10  Average Pain/symptom intensity (0-10 scale)  Last 24 hours: 2/10  Last week (1-7 days): 2/10  How often do you feel symptoms? Occasionally (26-50%)  Description: aching and spasm  Aggravating factors: Movement related turning LE a certain way  Alleviating factors: Rest, modified activities, Tylenol. NEURO SCREEN: denies numbness, tingling, and radiating pain     SOCIAL/FUNCTIONAL HISTORY:  How would you rate your overall health? very good  Pt lives with independent spouse in a(n) 1 story house with entry steps. 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 All hardware appears to be stable compared to immediate postoperative films. The acetabular component appears to be in good position, no evidence of loosening. Anteversion and inclination angle appear to be within acceptable criteria. The stem appears to be appropriately sized without any evidence of subsidence. No evidence of proximal femoral periprosthetic fracture. Hip is reduced. Impression: Normal Xray after total hip replacement     PATIENT STATED GOALS: Get back to playing senior softball and the senior games and to be able to walk proper again    SUBJECTIVE     Pt reports:  Felt good when I left here; went home sat down for about an hour and was stiff when I stood back up.     OBJECTIVE FINDINGS:  OBSERVATION:   POSTURE: The pelvis is level, LE alignment is symmetrical in the frontal and sagittal planes. SKIN INTEGRITY: Incision well healed           ATROPHY: R glute and thigh  PALPATION: Tender over the posterolateral R hip. HOS FUNCTIONAL QUESTIONNAIRE 22   RAW SCORE 27/68   % FUNCTION 40   % DYSFUNCTION/LIMITATION 60      PAIN RATING (0 - 10) 22   PRE 2  0 0   POST           LEFT (NON - INVOLVED) LQ AROM (?) RIGHT (INVOLVED)   NT HIP ER NT   NT HIP IR NT   105 HIP FLEXION 85   20 HIP ABDUCTION 20   NT HIP EXTENSION NT   130 KNEE FLEXION 105      LEFT (NON - INVOLVED) LQ MMT (0 - 5) RIGHT (INVOLVED)   5  HIP FLEXION 5    5 HIP ABDUCTION 2    5 HIP EXTENSION  NT    5 KNEE EXTENSION  5    5 KNEE FLEXION  5      TREATMENT PROVIDED TODAY:  PATIENT EDUCATION: PT supervised all exercises utilizing verbal and tactile cues as needed to correct technique. 78314 - THERAPEUTIC EXERCISE: 40 MIN. To address pain and deficits related to ROM and mm function - force production, endurance, neuromuscular coordination for completing ADLs. EXERCISES:  Stationary bike (seat position/intensity/duration):     Standing:  R/L front step ups: 2/10  R/L hip extension -  standing FBnt over plinth: 2/10 - Lt Bl R: Loop just above the knees  Lateral steppin cycles - Lt Bl R: Loop just above the knees  Diagonal steppin cycle    Side lying:  R hip abduction: 15  R clamshell: 10    PT directed stretching of hip:  -ER  - abduction  - flexion (mindful of healing restrictions)      Supine:   Bent knee fall out: 2/10 - Lt Bl R: Loop just above the knees  Bridging - two position:  - 10 - Lt Bl R: Loop just above the knees  Hip abduction 2 min - supine   Anterior hip/rectus femoris stretch - LE dropped off edge of plinth  Heel slides 2 min    Access Code: [email or web address]  URL: [email or web address]  Date: 2022  Prepared by: Courtney Murrieta     Exercises  Supine Heel Slide - 2 x daily - 6 x weekly - 1 sets - 1 - 2 minutes duration:  Supine Bridge - 2 x daily - 6 x weekly - 1 sets - 5 reps  Supine Single Bent Knee Fallout - 2 x daily - 6 x weekly - 1 sets - 10 reps  Supine Hip Abduction on Slider - 2 x daily - 6 x weekly - 1 sets - 10 reps  Standing Hip Abduction AROM - 2 x daily - 6 x weekly - 1 sets - 10 reps  Squat with Counter Support - 2 x daily - 6 x weekly - 1 sets - 10 reps    ASSESSMENT     Patient is now 6 weeks post R MARCELL. Today's session focused on improving mm strength and function of the hip and LE musculature. No complaints post session. Patient demonstrates improved R hip abduction strength being able to perform hip abduction in side lying for the first time. The following impairments are present:  R hip discomfort  Limited R hip ROM - P & A  Hip strength  Balance - static & dynamic    These impairments result in the following activity limitations:  Wbing ADLs involving - standing, walking, squatting, kneeling, lifting, carrying    PLAN     Evaluate response to session. Progress TE and MT to restore hip function. GOALS      SHORT - TERM GOALS TO BE ATTAINED BY 1/13/23:  Patient will voice understanding of symptom limited activity principle. Patient will report maintaining pain ? 4/10 w symptom limited activity, use of modalities of cold and/or heat, positioning, meds and exercise. Patient will report compliance w home program of exercise, cold and elevation. Patient will demonstrate independent skill w initial HEP. Improve HOS score to ? 50% limitation. LONG - TERM GOALS TO BE ATTAINED BY 3/10/23:  Improve HOS score to ? 20% limitation. Patient will return to normal sleep pattern re quality and duration. Patient will demonstrate independent skill w maintenance exercise program.  Patient will attain asymptomatic hip function that allows patient to perform ADLs involving FBing, lifting, carrying, reaching and twisting.   Patient will tolerate sustained position of sitting for ? 60 min for self - care/dressing/grooming, driving, travel, work, family or social activities. Patient will tolerate sustained position of standing for ? 45 min for self - care/dressing/grooming, preparing meals/cleaning home, work, family or social activities. Patient will tolerate walking for as necessary for travel, work, family or social activities. Patient will be able to lift from the floor 20 lbs for travel, work, family or social activities. Patient will attain hip function - ROM, mm strength - endurance, coordination of movement - that allows patient to resume desired activities/attain patient goals of: resuming training for the [employer].      Balinda Medico

## 2023-01-04 ENCOUNTER — OFFICE VISIT (OUTPATIENT)
Dept: ORTHOPEDIC SURGERY | Age: 88
End: 2023-01-04
Payer: MEDICARE

## 2023-01-04 DIAGNOSIS — Z78.9 DECREASED ACTIVITIES OF DAILY LIVING (ADL): ICD-10-CM

## 2023-01-04 DIAGNOSIS — Z96.641 STATUS POST RIGHT HIP REPLACEMENT: Primary | ICD-10-CM

## 2023-01-04 DIAGNOSIS — M25.651 IMPAIRED RANGE OF MOTION OF RIGHT HIP: ICD-10-CM

## 2023-01-04 DIAGNOSIS — M25.559 HIP PAIN: ICD-10-CM

## 2023-01-04 DIAGNOSIS — R68.89 DECREASED ACTIVITY TOLERANCE: ICD-10-CM

## 2023-01-04 DIAGNOSIS — R29.898 IMPAIRED STRENGTH OF HIP MUSCLES: ICD-10-CM

## 2023-01-04 DIAGNOSIS — Z74.09 IMPAIRED FUNCTIONAL MOBILITY, BALANCE, GAIT, AND ENDURANCE: ICD-10-CM

## 2023-01-04 PROCEDURE — 97110 THERAPEUTIC EXERCISES: CPT | Performed by: PHYSICAL THERAPIST

## 2023-01-04 NOTE — PROGRESS NOTES
[address] [facility]  [address] [facility],[address] [address]  Dept: [phone]    PHYSICAL THERAPY DAILY NOTE     DATE of EVAL: 12/16/22    POC ENDS: 3/10/23    PT SESSION: 6 of 20    DATE of LAST PROGRESS NOTE: ---    DATE of SURGERY: Fri. 11/18/22  WEEKS POST OP: 6+    TOTAL TIMED PROCEDURE CODES: 39 MIN. TOTAL TIME: 45 MIN. REFERRING PROVIDER: Gladys Pimentel MD  DIAGNOSIS:     ICD-10-CM    1. Status post right hip replacement  Z96.641       2. Hip pain  M25.559       3. Impaired range of motion of right hip  M25.651       4. Impaired strength of hip muscles  R29.898       5. Impaired functional mobility, balance, gait, and endurance  Z74.09       6. Decreased activities of daily living (ADL)  Z78.9       7. Decreased activity tolerance  R68.89                THERAPY PRECAUTIONS:None  CO - MORBIDITIES AFFECTING PLAN OF CARE: None    PERTINENT MEDICAL HISTORY     Past Medical History:   Diagnosis Date    Arthritis     mild    Arthropathy, unspecified, site unspecified 7/8/2015    Back pain     Chronic renal insufficiency     pt denies 10/25/22    Diverticulosis of large intestine without diverticulitis 2016    Essential hypertension, benign 7/8/2015    managed with medication    Full dentures     GERD (gastroesophageal reflux disease)     hx-no medication at this time (noted 10/25/22)    Hand pain, left     Hip pain, right     Hx of colonic polyps 2016    adenoma    Osteoarthritis of finger of left hand     Other abnormal glucose 7/8/2015    Other and unspecified hyperlipidemia 7/8/2015    Shoulder pain         HISTORY RELATED TO PRESENT COMPLAINT      CHIEF COMPLAINTS: Stiff R hip. Cant bend over to tie shoes, etc.     HISTORY OF INJURY:   2 - 3 hx of R hip pain w hx of falls prior to MARCELL.   Date symptoms began: Fri. 11/18/22  True [place] of condition: Recent onset (initial onset within last 3 months)  Primary cause of current episode: Post-surgical  How did symptoms start: MARCELL  Describe current symptoms: Stiff R hip; aching, spasms. Received previous outpatient physical therapy for this problem? Yes; Number of therapy visits in the last 90 days: 8     PAIN ASSESSMENT:  Pain location: R anterior thigh pain    Current (0-10 pain scale): 0/10  Average Pain/symptom intensity (0-10 scale)  Last 24 hours: 2/10  Last week (1-7 days): 2/10  How often do you feel symptoms? Occasionally (26-50%)  Description: aching and spasm  Aggravating factors: Movement related turning LE a certain way  Alleviating factors: Rest, modified activities, Tylenol. NEURO SCREEN: denies numbness, tingling, and radiating pain     SOCIAL/FUNCTIONAL HISTORY:  How would you rate your overall health? very good  Pt lives with independent spouse in a(n) 1 story house with entry steps. 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 All hardware appears to be stable compared to immediate postoperative films. The acetabular component appears to be in good position, no evidence of loosening. Anteversion and inclination angle appear to be within acceptable criteria. The stem appears to be appropriately sized without any evidence of subsidence. No evidence of proximal femoral periprosthetic fracture. Hip is reduced. Impression: Normal Xray after total hip replacement     PATIENT STATED GOALS: Get back to playing senior softball and the senior games and to be able to walk proper again    SUBJECTIVE     Pt reports:  Variable stiffness, discomfort.     OBJECTIVE     FINDINGS:  OBSERVATION:   POSTURE: The pelvis is level, LE alignment is symmetrical in the frontal and sagittal planes. SKIN INTEGRITY: Incision well healed           ATROPHY: R glute and thigh  PALPATION: Tender over the posterolateral R hip. HOS FUNCTIONAL QUESTIONNAIRE 22   RAW SCORE 27/68   % FUNCTION 40   % DYSFUNCTION/LIMITATION 60      PAIN RATING (0 - 10) 22   PRE 2  0 0 1   POST            LEFT (NON - INVOLVED) LQ AROM (?) RIGHT (INVOLVED)   13 HIP ER 8   (20 passive)   13 HIP IR 18   101 HIP FLEXION 85   18 HIP ABDUCTION 15   NT HIP EXTENSION NT   121 KNEE FLEXION 122      LEFT (NON - INVOLVED) LQ MMT (0 - 5) RIGHT (INVOLVED)   5  HIP FLEXION 5    5 HIP ABDUCTION 3    5 HIP EXTENSION  NT    5 KNEE EXTENSION  5    5 KNEE FLEXION  5      TREATMENT PROVIDED TODAY:  PATIENT EDUCATION: PT supervised all exercises utilizing verbal and tactile cues as needed to correct technique.  - THERAPEUTIC EXERCISE: 45 MIN. To address pain and deficits related to ROM and mm function - force production, endurance, neuromuscular coordination for completing ADLs. Hip AROM reassessed. EXERCISES:  Stationary bike (seat position/intensity/duration):     Standing:  R/L front step ups: 2/10  Lateral steppin cycles - Lt Bl R: Loop just above the knees *  Monster walk: 1 cycle - Lt Bl R: Loop just above the knees*  *SBA due to occasional stumbles    Side lying:  R hip abduction: 15  R clamshell: 10    PT directed stretching of hip:  -ER  - abduction  - flexion (mindful of healing restrictions)    Prone:  Knee flexion  Hip ER stretch  Hip extension stretch    Supine:   Bent knee fall out: 2/10 - Lt Bl R: Loop just above the knees  Bridging - two position: 2 - 10 - Lt Bl R: Loop just above the knees  Hip abduction 2 min - supine   Anterior hip/rectus femoris stretch - LE dropped off edge of plinth  Heel slides 2 min    Access Code: [email or web address]  URL: [email or web address]  Date: 2022  Prepared by: John Bills Exercises  Supine Heel Slide - 2 x daily - 6 x weekly - 1 sets - 1 - 2 minutes duration:  Supine Bridge - 2 x daily - 6 x weekly - 1 sets - 5 reps  Supine Single Bent Knee Fallout - 2 x daily - 6 x weekly - 1 sets - 10 reps  Supine Hip Abduction on Slider - 2 x daily - 6 x weekly - 1 sets - 10 reps  Standing Hip Abduction AROM - 2 x daily - 6 x weekly - 1 sets - 10 reps  Squat with Counter Support - 2 x daily - 6 x weekly - 1 sets - 10 reps    ASSESSMENT     Today's session focused on reassessing hip AROM f/b improving mm strength and function of the hip and LE musculature. Post session patient reports 'hip feels looser'. W AROM patient remains limited w hip flexion and ER where no change noted w hip flexion and hip Erfrom IE. The following impairments are present:  R hip discomfort  Limited R hip ROM - P & A  Hip strength  Balance - static & dynamic    These impairments result in the following activity limitations:  Wbing ADLs involving - standing, walking, squatting, kneeling, lifting, carrying    PLAN     Evaluate response to session. Progress TE and MT to restore hip function. GOALS      SHORT - TERM GOALS TO BE ATTAINED BY 1/13/23:  Patient will voice understanding of symptom limited activity principle. Patient will report maintaining pain ? 4/10 w symptom limited activity, use of modalities of cold and/or heat, positioning, meds and exercise. Patient will report compliance w home program of exercise, cold and elevation. Patient will demonstrate independent skill w initial HEP. Improve HOS score to ? 50% limitation. LONG - TERM GOALS TO BE ATTAINED BY 3/10/23:  Improve HOS score to ? 20% limitation. Patient will return to normal sleep pattern re quality and duration. Patient will demonstrate independent skill w maintenance exercise program.  Patient will attain asymptomatic hip function that allows patient to perform ADLs involving FBing, lifting, carrying, reaching and twisting.   Patient will tolerate sustained position of sitting for ? 60 min for self - care/dressing/grooming, driving, travel, work, family or social activities. Patient will tolerate sustained position of standing for ? 45 min for self - care/dressing/grooming, preparing meals/cleaning home, work, family or social activities. Patient will tolerate walking for as necessary for travel, work, family or social activities. Patient will be able to lift from the floor 20 lbs for travel, work, family or social activities. Patient will attain hip function - ROM, mm strength - endurance, coordination of movement - that allows patient to resume desired activities/attain patient goals of: resuming training for the [employer].      Sriram Rodriguez

## 2023-01-06 ENCOUNTER — OFFICE VISIT (OUTPATIENT)
Dept: ORTHOPEDIC SURGERY | Age: 88
End: 2023-01-06
Payer: MEDICARE

## 2023-01-06 DIAGNOSIS — Z96.641 STATUS POST RIGHT HIP REPLACEMENT: Primary | ICD-10-CM

## 2023-01-06 DIAGNOSIS — Z74.09 IMPAIRED FUNCTIONAL MOBILITY, BALANCE, GAIT, AND ENDURANCE: ICD-10-CM

## 2023-01-06 DIAGNOSIS — M25.559 HIP PAIN: ICD-10-CM

## 2023-01-06 DIAGNOSIS — Z78.9 DECREASED ACTIVITIES OF DAILY LIVING (ADL): ICD-10-CM

## 2023-01-06 DIAGNOSIS — R68.89 DECREASED ACTIVITY TOLERANCE: ICD-10-CM

## 2023-01-06 DIAGNOSIS — M25.651 IMPAIRED RANGE OF MOTION OF RIGHT HIP: ICD-10-CM

## 2023-01-06 DIAGNOSIS — R29.898 IMPAIRED STRENGTH OF HIP MUSCLES: ICD-10-CM

## 2023-01-06 PROCEDURE — 97110 THERAPEUTIC EXERCISES: CPT | Performed by: PHYSICAL THERAPIST

## 2023-01-06 NOTE — PROGRESS NOTES
[address] [facility]  [address] [facility] [address]  Dept: [phone]    PHYSICAL THERAPY DAILY NOTE     DATE of EVAL: 12/16/22    POC ENDS: 3/10/23    PT SESSION: 7 of 20    DATE of LAST PROGRESS NOTE: ---    DATE of SURGERY: Fri. 11/18/22  WEEKS POST OP: 7    TOTAL TIMED PROCEDURE CODES: 39 MIN. TOTAL TIME: 45 MIN. REFERRING PROVIDER: Martin Alaniz MD  DIAGNOSIS:     ICD-10-CM    1. Status post right hip replacement  Z96.641       2. Hip pain  M25.559       3. Impaired range of motion of right hip  M25.651       4. Impaired strength of hip muscles  R29.898       5. Impaired functional mobility, balance, gait, and endurance  Z74.09       6. Decreased activities of daily living (ADL)  Z78.9       7. Decreased activity tolerance  R68.89                THERAPY PRECAUTIONS:None  CO - MORBIDITIES AFFECTING PLAN OF CARE: None    PERTINENT MEDICAL HISTORY     Past Medical History:   Diagnosis Date    Arthritis     mild    Arthropathy, unspecified, site unspecified 7/8/2015    Back pain     Chronic renal insufficiency     pt denies 10/25/22    Diverticulosis of large intestine without diverticulitis 2016    Essential hypertension, benign 7/8/2015    managed with medication    Full dentures     GERD (gastroesophageal reflux disease)     hx-no medication at this time (noted 10/25/22)    Hand pain, left     Hip pain, right     Hx of colonic polyps 2016    adenoma    Osteoarthritis of finger of left hand     Other abnormal glucose 7/8/2015    Other and unspecified hyperlipidemia 7/8/2015    Shoulder pain         HISTORY RELATED TO PRESENT COMPLAINT      CHIEF COMPLAINTS: Stiff R hip. Cant bend over to tie shoes, etc.     HISTORY OF INJURY:   2 - 3 hx of R hip pain w hx of falls prior to MARCELL.   Date symptoms began: Fri. 11/18/22  [place] of condition: Recent onset (initial onset within last 3 months)  Primary cause of current episode: Post-surgical  How did symptoms start: MARCELL  Describe current symptoms: Stiff R hip; aching, spasms. Received previous outpatient physical therapy for this problem? Yes; Number of therapy visits in the last 90 days: 8     PAIN ASSESSMENT:  Pain location: R anterior thigh pain    Current (0-10 pain scale): 0/10  Average Pain/symptom intensity (0-10 scale)  Last 24 hours: 2/10  Last week (1-7 days): 2/10  How often do you feel symptoms? Occasionally (26-50%)  Description: aching and spasm  Aggravating factors: Movement related turning LE a certain way  Alleviating factors: Rest, modified activities, Tylenol. NEURO SCREEN: denies numbness, tingling, and radiating pain     SOCIAL/FUNCTIONAL HISTORY:  How would you rate your overall health? very good  Pt lives with independent spouse in a(n) 1 story house with entry steps. 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 All hardware appears to be stable compared to immediate postoperative films. The acetabular component appears to be in good position, no evidence of loosening. Anteversion and inclination angle appear to be within acceptable criteria. The stem appears to be appropriately sized without any evidence of subsidence. No evidence of proximal femoral periprosthetic fracture. Hip is reduced. Impression: Normal Xray after total hip replacement     PATIENT STATED GOALS: Get back to playing senior softball and the senior games and to be able to walk proper again    SUBJECTIVE     Pt reports:  CC: stiffness.     OBJECTIVE     FINDINGS:  OBSERVATION:   POSTURE: The pelvis is level, LE alignment is symmetrical in the frontal and sagittal planes. SKIN INTEGRITY: Incision well healed           ATROPHY: R glute and thigh  PALPATION: Tender over the posterolateral R hip. HOS FUNCTIONAL QUESTIONNAIRE 22   RAW SCORE 27/68   % FUNCTION 40   % DYSFUNCTION/LIMITATION 60      PAIN RATING (0 - 10) 22   PRE 2  0 0 1   POST            LEFT (NON - INVOLVED) LQ AROM (?) RIGHT (INVOLVED)   13 HIP ER 8   (20 passive)   13 HIP IR 18   101 HIP FLEXION 85   18 HIP ABDUCTION 15   NT HIP EXTENSION NT   121 KNEE FLEXION 122      LEFT (NON - INVOLVED) LQ MMT (0 - 5) RIGHT (INVOLVED)   5  HIP FLEXION 5    5 HIP ABDUCTION 3    5 HIP EXTENSION  NT    5 KNEE EXTENSION  5    5 KNEE FLEXION  5      TREATMENT PROVIDED TODAY:  PATIENT EDUCATION: PT supervised all exercises utilizing verbal and tactile cues as needed to correct technique. [id number] - THERAPEUTIC EXERCISE: 45 MIN. To address pain and deficits related to ROM and mm function - force production, endurance, neuromuscular coordination for completing ADLs. Hip AROM reassessed. EXERCISES:  Stationary bike (seat position/intensity/duration):     Standing:  *R/L front step ups: Bl 3/10 - 0#, 3#, 5#  *Lateral steppin cycles - Lt Bl R: Loop just above the knees   *Monster walk: 1 cycle - Lt Bl R: Loop just above the knees  *SBA due to occasional stumbles    Side lying:  R hip abduction:1/10  R clamshell: 1/10    PT directed stretching of hip:  -ER  - abduction  - flexion (mindful of healing restrictions)    Prone:  Knee flexion  Hip ER stretch  Hip extension stretch    Supine:   Bent knee fall out: 2/15 - Lt Bl R: Loop just above the knees  Bridging - two position:  -  - Lt Bl R: Loop just above the knees  Hip abduction 1/15 - Lt Bl R: Loop just above the knees  Anterior hip/rectus femoris stretch - LE dropped off edge of plinth:  min. Access Code: [email or web address]  URL: [email or web address]  Date: 2022  Prepared by: Collins Renee     Exercises  Supine Heel Slide - 2 x daily - 6 x weekly - 1 sets - 1 - 2 minutes duration:  Supine Bridge - 2 x daily - 6 x weekly - 1 sets - 5 reps  Supine Single Bent Knee Fallout - 2 x daily - 6 x weekly - 1 sets - 10 reps  Supine Hip Abduction on Slider - 2 x daily - 6 x weekly - 1 sets - 10 reps  Standing Hip Abduction AROM - 2 x daily - 6 x weekly - 1 sets - 10 reps  Squat with Counter Support - 2 x daily - 6 x weekly - 1 sets - 10 reps    ASSESSMENT     Today's session focused on  improving mm strength and function of the hip and LE musculature. W AROM patient remains limited w hip flexion and ER where no change noted w hip flexion and hip ER from IE. Requires SBA due to episodic stumbling w closed chain activities. The following impairments are present:  R hip discomfort  Limited R hip ROM - P & A  Hip strength  Balance - static & dynamic    These impairments result in the following activity limitations:  Wbing ADLs involving - standing, walking, squatting, kneeling, lifting, carrying    PLAN     Evaluate response to session. Progress TE and MT to restore hip function. GOALS      SHORT - TERM GOALS TO BE ATTAINED BY 1/13/23:  Patient will voice understanding of symptom limited activity principle. Patient will report maintaining pain ? 4/10 w symptom limited activity, use of modalities of cold and/or heat, positioning, meds and exercise. Patient will report compliance w home program of exercise, cold and elevation. Patient will demonstrate independent skill w initial HEP. Improve HOS score to ? 50% limitation. LONG - TERM GOALS TO BE ATTAINED BY 3/10/23:  Improve HOS score to ? 20% limitation. Patient will return to normal sleep pattern re quality and duration.   Patient will demonstrate independent skill w maintenance exercise program.  Patient will attain asymptomatic hip function that allows patient to perform ADLs involving FBing, lifting, carrying, reaching and twisting. Patient will tolerate sustained position of sitting for ? 60 min for self - care/dressing/grooming, driving, travel, work, family or social activities. Patient will tolerate sustained position of standing for ? 45 min for self - care/dressing/grooming, preparing meals/cleaning home, work, family or social activities. Patient will tolerate walking for as necessary for travel, work, family or social activities. Patient will be able to lift from the floor 20 lbs for travel, work, family or social activities. Patient will attain hip function - ROM, mm strength - endurance, coordination of movement - that allows patient to resume desired activities/attain patient goals of: resuming training for the [employer].      Galdino Kika

## 2023-01-11 ENCOUNTER — OFFICE VISIT (OUTPATIENT)
Dept: ORTHOPEDIC SURGERY | Age: 88
End: 2023-01-11
Payer: MEDICARE

## 2023-01-11 DIAGNOSIS — R68.89 DECREASED ACTIVITY TOLERANCE: ICD-10-CM

## 2023-01-11 DIAGNOSIS — Z74.09 IMPAIRED FUNCTIONAL MOBILITY, BALANCE, GAIT, AND ENDURANCE: ICD-10-CM

## 2023-01-11 DIAGNOSIS — M25.651 IMPAIRED RANGE OF MOTION OF RIGHT HIP: ICD-10-CM

## 2023-01-11 DIAGNOSIS — R29.898 IMPAIRED STRENGTH OF HIP MUSCLES: ICD-10-CM

## 2023-01-11 DIAGNOSIS — Z78.9 DECREASED ACTIVITIES OF DAILY LIVING (ADL): ICD-10-CM

## 2023-01-11 DIAGNOSIS — M25.559 HIP PAIN: ICD-10-CM

## 2023-01-11 DIAGNOSIS — Z96.641 STATUS POST RIGHT HIP REPLACEMENT: Primary | ICD-10-CM

## 2023-01-11 PROCEDURE — 97110 THERAPEUTIC EXERCISES: CPT | Performed by: PHYSICAL THERAPIST

## 2023-01-11 NOTE — PROGRESS NOTES
[address] [facility]  [address] [facility] [place],[address] [address]  Dept: [phone]    PHYSICAL THERAPY DAILY NOTE     DATE of EVAL: 12/16/22    POC ENDS: 3/10/23    PT SESSION: 8 of 20    DATE of LAST PROGRESS NOTE: ---    DATE of SURGERY: Fri. 11/18/22  WEEKS POST OP: 8+    TOTAL TIMED PROCEDURE CODES: 54 MIN. TOTAL TIME: 55 MIN. REFERRING PROVIDER: Dakota Gary MD  DIAGNOSIS:     ICD-10-CM    1. Status post right hip replacement  Z96.641       2. Hip pain  M25.559       3. Impaired range of motion of right hip  M25.651       4. Impaired strength of hip muscles  R29.898       5. Impaired functional mobility, balance, gait, and endurance  Z74.09       6. Decreased activities of daily living (ADL)  Z78.9       7. Decreased activity tolerance  R68.89          THERAPY PRECAUTIONS:None  CO - MORBIDITIES AFFECTING PLAN OF CARE: None    PERTINENT MEDICAL HISTORY     Past Medical History:   Diagnosis Date    Arthritis     mild    Arthropathy, unspecified, site unspecified 7/8/2015    Back pain     Chronic renal insufficiency     pt denies 10/25/22    Diverticulosis of large intestine without diverticulitis 2016    Essential hypertension, benign 7/8/2015    managed with medication    Full dentures     GERD (gastroesophageal reflux disease)     hx-no medication at this time (noted 10/25/22)    Hand pain, left     Hip pain, right     Hx of colonic polyps 2016    adenoma    Osteoarthritis of finger of left hand     Other abnormal glucose 7/8/2015    Other and unspecified hyperlipidemia 7/8/2015    Shoulder pain         HISTORY RELATED TO PRESENT COMPLAINT      CHIEF COMPLAINTS: Stiff R hip. Cant bend over to tie shoes, etc.     HISTORY OF INJURY:   2 - 3 hx of R hip pain w hx of falls prior to MARCELL.   Date symptoms began: Fri. 11/18/22  Martyrell Brome of condition: Recent onset (initial onset within last 3 months)  Primary cause of current episode: Post-surgical  How did symptoms start: MARCELL  Describe current symptoms: Stiff R hip; aching, spasms. Received previous outpatient physical therapy for this problem? Yes; Number of therapy visits in the last 90 days: 8     PAIN ASSESSMENT:  Pain location: R anterior thigh pain    Current (0-10 pain scale): 0/10  Average Pain/symptom intensity (0-10 scale)  Last 24 hours: 2/10  Last week (1-7 days): 2/10  How often do you feel symptoms? Occasionally (26-50%)  Description: aching and spasm  Aggravating factors: Movement related turning LE a certain way  Alleviating factors: Rest, modified activities, Tylenol. NEURO SCREEN: denies numbness, tingling, and radiating pain     SOCIAL/FUNCTIONAL HISTORY:  How would you rate your overall health? very good  Pt lives with independent spouse in a(n) 1 story house with entry steps. 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 All hardware appears to be stable compared to immediate postoperative films. The acetabular component appears to be in good position, no evidence of loosening. Anteversion and inclination angle appear to be within acceptable criteria. The stem appears to be appropriately sized without any evidence of subsidence. No evidence of proximal femoral periprosthetic fracture. Hip is reduced. Impression: Normal Xray after total hip replacement     PATIENT STATED GOALS: Get back to playing senior softball and the senior games and to be able to walk proper again    SUBJECTIVE     Pt reports:  CC: this mm here - indicates anterior thigh. I feel it w attempting to lift the leg (SLR).     OBJECTIVE FINDINGS:  OBSERVATION:   POSTURE: The pelvis is level, LE alignment is symmetrical in the frontal and sagittal planes. SKIN INTEGRITY: Incision well healed           ATROPHY: R glute and thigh  PALPATION: Tender over the anterior thigh (RF) & posterolateral R hip. HOS FUNCTIONAL QUESTIONNAIRE 22   RAW SCORE 27/68   % FUNCTION 40   % DYSFUNCTION/LIMITATION 60      PAIN RATING (0 - 10) 22   PRE 2  0 0 1   POST            LEFT (NON - INVOLVED) LQ AROM (?) RIGHT (INVOLVED)   13 HIP ER (45°) 8   (20 passive)   13 HIP IR (45°) 18   101 HIP FLEXION (120°) 85   18 HIP ABDUCTION (45°) 15   NT HIP EXTENSION (10 - 30°) NT   121 KNEE FLEXION (140°) 122      LEFT (NON - INVOLVED) LQ MMT (0 - 5) RIGHT (INVOLVED)   5  HIP FLEXION 5    5 HIP ABDUCTION 3    5 HIP EXTENSION  NT    5 KNEE EXTENSION  5    5 KNEE FLEXION  5      TREATMENT PROVIDED TODAY:  PATIENT EDUCATION: PT supervised all exercises utilizing verbal and tactile cues as needed to correct technique. 75672 - THERAPEUTIC EXERCISE: 55 MIN. To address pain and deficits related to ROM and mm function - force production, endurance, neuromuscular coordination for completing ADLs. Hip AROM reassessed.   EXERCISES:  Stationary bike (seat position/intensity/duration): 6/3/5    Standing:  R/L hip hikin/10  *R/L front step ups: Bl 2/10 - 5#, 6#.  *Lateral steppin cycles - Lt Bl R: Loop just above the knees   *Monster walk: 2 cycle - Lt Bl R: Loop just above the knees  *SBA due to occasional stumbles    Side lying:  R hip abduction:1/10  R clamshell: 1/10    PT directed stretching of hip:  -ER  - abduction  - flexion (mindful of healing restrictions)    Prone:  Knee flexion  Hip ER stretch  Hip extension stretch    Supine:   Bent knee fall out:  - Green R: Loop just above the knees  Bridging - two position: 2 - 10 - Green R: Loop just above the knees  Hip abduction 2/10 - Green R: Loop just above the knees  Anterior hip/rectus femoris stretch - LE dropped off edge of plinth: 2/1 min. Access Code: [email or web address]  URL: [email or web address]  Date: 12/16/2022  Prepared by: Stephany Alicia     Exercises  Supine Heel Slide - 2 x daily - 6 x weekly - 1 sets - 1 - 2 minutes duration:  Supine Bridge - 2 x daily - 6 x weekly - 1 sets - 5 reps  Supine Single Bent Knee Fallout - 2 x daily - 6 x weekly - 1 sets - 10 reps  Supine Hip Abduction on Slider - 2 x daily - 6 x weekly - 1 sets - 10 reps  Standing Hip Abduction AROM - 2 x daily - 6 x weekly - 1 sets - 10 reps  Squat with Counter Support - 2 x daily - 6 x weekly - 1 sets - 10 reps    ASSESSMENT     Today's session focused on: improving mobility of the rectus femoris  improving mm strength and function of the hip and LE musculature. W AROM patient remains limited w hip flexion and extension, abduction and ER. Requires SBA due to episodic stumbling w closed chain activities. Patient reports/demonstrates the following improvements:  Hip flexion to ~100°     The following impairments are present:  R hip discomfort  Limited R hip ROM - P & A  Hip strength  Balance - static & dynamic    These impairments result in the following activity limitations:  Wbing ADLs involving - standing, walking, squatting, kneeling, lifting, carrying    PLAN     Assess STGs. Evaluate response to session. Progress TE and MT to restore hip function. GOALS      SHORT - TERM GOALS TO BE ATTAINED BY 1/13/23:  Patient will voice understanding of symptom limited activity principle. Patient will report maintaining pain ? 4/10 w symptom limited activity, use of modalities of cold and/or heat, positioning, meds and exercise. Patient will report compliance w home program of exercise, cold and elevation. Patient will demonstrate independent skill w initial HEP. Improve HOS score to ? 50% limitation.      LONG - TERM GOALS TO BE ATTAINED BY 3/10/23:  Improve HOS score to ? 20% limitation. Patient will return to normal sleep pattern re quality and duration. Patient will demonstrate independent skill w maintenance exercise program.  Patient will attain asymptomatic hip function that allows patient to perform ADLs involving FBing, lifting, carrying, reaching and twisting. Patient will tolerate sustained position of sitting for ? 60 min for self - care/dressing/grooming, driving, travel, work, family or social activities. Patient will tolerate sustained position of standing for ? 45 min for self - care/dressing/grooming, preparing meals/cleaning home, work, family or social activities. Patient will tolerate walking for as necessary for travel, work, family or social activities. Patient will be able to lift from the floor 20 lbs for travel, work, family or social activities. Patient will attain hip function - ROM, mm strength - endurance, coordination of movement - that allows patient to resume desired activities/attain patient goals of: resuming training for the [employer].      [facility]

## 2023-01-13 ENCOUNTER — OFFICE VISIT (OUTPATIENT)
Dept: ORTHOPEDIC SURGERY | Age: 88
End: 2023-01-13
Payer: MEDICARE

## 2023-01-13 DIAGNOSIS — Z78.9 DECREASED ACTIVITIES OF DAILY LIVING (ADL): ICD-10-CM

## 2023-01-13 DIAGNOSIS — M25.651 IMPAIRED RANGE OF MOTION OF RIGHT HIP: ICD-10-CM

## 2023-01-13 DIAGNOSIS — Z96.641 STATUS POST RIGHT HIP REPLACEMENT: Primary | ICD-10-CM

## 2023-01-13 DIAGNOSIS — M25.559 HIP PAIN: ICD-10-CM

## 2023-01-13 DIAGNOSIS — R29.898 IMPAIRED STRENGTH OF HIP MUSCLES: ICD-10-CM

## 2023-01-13 DIAGNOSIS — R68.89 DECREASED ACTIVITY TOLERANCE: ICD-10-CM

## 2023-01-13 DIAGNOSIS — Z74.09 IMPAIRED FUNCTIONAL MOBILITY, BALANCE, GAIT, AND ENDURANCE: ICD-10-CM

## 2023-01-13 PROCEDURE — 97110 THERAPEUTIC EXERCISES: CPT | Performed by: PHYSICAL THERAPIST

## 2023-01-13 NOTE — PROGRESS NOTES
[address] [facility]  [address] [facility],[address] [address]  Dept: [phone]    PHYSICAL THERAPY DAILY NOTE     DATE of EVAL: 12/16/22    POC ENDS: 3/10/23    PT SESSION: 9 of 20    DATE of LAST PROGRESS NOTE: ---    DATE of SURGERY: Fri. 11/18/22  WEEKS POST OP: 8    TOTAL TIMED PROCEDURE CODES: 39 MIN. TOTAL TIME: 45 MIN. REFERRING PROVIDER: Analilia Luis MD  DIAGNOSIS:     ICD-10-CM    1. Status post right hip replacement  Z96.641       2. Hip pain  M25.559       3. Impaired range of motion of right hip  M25.651       4. Impaired strength of hip muscles  R29.898       5. Impaired functional mobility, balance, gait, and endurance  Z74.09       6. Decreased activities of daily living (ADL)  Z78.9       7. Decreased activity tolerance  R68.89          THERAPY PRECAUTIONS:None  CO - MORBIDITIES AFFECTING PLAN OF CARE: None    PERTINENT MEDICAL HISTORY     Past Medical History:   Diagnosis Date    Arthritis     mild    Arthropathy, unspecified, site unspecified 7/8/2015    Back pain     Chronic renal insufficiency     pt denies 10/25/22    Diverticulosis of large intestine without diverticulitis 2016    Essential hypertension, benign 7/8/2015    managed with medication    Full dentures     GERD (gastroesophageal reflux disease)     hx-no medication at this time (noted 10/25/22)    Hand pain, left     Hip pain, right     Hx of colonic polyps 2016    adenoma    Osteoarthritis of finger of left hand     Other abnormal glucose 7/8/2015    Other and unspecified hyperlipidemia 7/8/2015    Shoulder pain         HISTORY RELATED TO PRESENT COMPLAINT      CHIEF COMPLAINTS: Stiff R hip. Cant bend over to tie shoes, etc.     HISTORY OF INJURY:   2 - 3 hx of R hip pain w hx of falls prior to MARCELL.   Date symptoms began: Fri. 11/18/22  Zandra  of condition: Recent onset (initial onset within last 3 months)  Primary cause of current episode: Post-surgical  How did symptoms start: MARCELL  Describe current symptoms: Stiff R hip; aching, spasms. Received previous outpatient physical therapy for this problem? Yes; Number of therapy visits in the last 90 days: 8     PAIN ASSESSMENT:  Pain location: R anterior thigh pain    Current (0-10 pain scale): 0/10  Average Pain/symptom intensity (0-10 scale)  Last 24 hours: 2/10  Last week (1-7 days): 2/10  How often do you feel symptoms? Occasionally (26-50%)  Description: aching and spasm  Aggravating factors: Movement related turning LE a certain way  Alleviating factors: Rest, modified activities, Tylenol. NEURO SCREEN: denies numbness, tingling, and radiating pain     SOCIAL/FUNCTIONAL HISTORY:  How would you rate your overall health? very good  Pt lives with independent spouse in a(n) 1 story house with entry steps. 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 All hardware appears to be stable compared to immediate postoperative films. The acetabular component appears to be in good position, no evidence of loosening. Anteversion and inclination angle appear to be within acceptable criteria. The stem appears to be appropriately sized without any evidence of subsidence. No evidence of proximal femoral periprosthetic fracture. Hip is reduced. Impression: Normal Xray after total hip replacement     PATIENT STATED GOALS: Get back to playing senior softball and the senior games and to be able to walk proper again    SUBJECTIVE     Pt reports:  Some soreness of thigh, lateral hip.     OBJECTIVE     FINDINGS:  OBSERVATION:   POSTURE: The pelvis is level, LE alignment is symmetrical in the frontal and sagittal planes. SKIN INTEGRITY: Incision well healed           ATROPHY: R glute and thigh  PALPATION: Tender over the anterior thigh (RF) & posterolateral R hip. HOS FUNCTIONAL QUESTIONNAIRE 22   RAW SCORE  45/68   % FUNCTION 40 66   % DYSFUNCTION/LIMITATION 60 34      PAIN RATING (0 - 10) 22   PRE 2  0 0 1  ? 1   POST             LEFT (NON - INVOLVED) LQ AROM (?) RIGHT (23)   13 HIP ER (45°) 12 (+4)   13 HIP IR (45°) 19 (+1)   101 HIP FLEXION (120°) 93 (+8)   18 HIP ABDUCTION (45°) 15 (NC)   NT HIP EXTENSION (10 - 30°) NT   121 KNEE FLEXION (140°) 115(-7)      LEFT (NON - INVOLVED) LQ MMT (0 - 5) RIGHT (23)   5  HIP FLEXION 5    5 HIP ABDUCTION 4    5 HIP EXTENSION  NT    5 KNEE EXTENSION  5    5 KNEE FLEXION  5      TREATMENT PROVIDED TODAY:  PATIENT EDUCATION: PT supervised all exercises utilizing verbal and tactile cues as needed to correct technique. 92253 - THERAPEUTIC EXERCISE: 45 MIN. To address pain and deficits related to ROM and mm function - force production, endurance, neuromuscular coordination for completing ADLs. Hip AROM reassessed. EXERCISES:  Stationary bike (seat position/intensity/duration): 6/3/5        Standing:  R/L hip hikin/10  *Lateral steppin cycles - Lt Bl R: Loop just above the knees   *Monster walk: 2 cycle - Lt Bl R: Loop just above the knees  *SBA due to occasional stumbles    Side lying:  R hip abduction:  R clamshell:     Prone:  Knee flexion  Hip ER stretch  Hip extension stretch    Supine:   Bridging - two position:  - Anterior hip/rectus femoris stretch - LE dropped off edge of plinth:  min. PT directed stretching of hip:  -ER  - abduction  - flexion (mindful of healing restrictions)    Access Code: [email or web address]  URL: [email or web address]  Date: 2022  Prepared by: Swapna Gonzales     Exercises  Supine Heel Slide - 2 x daily - 6 x weekly - 1 sets - 1 - 2 minutes duration:  Supine Bridge - 2 x daily - 6 x weekly - 1 sets - 5 reps  Supine Single Bent Knee Fallout - 2 x daily - 6 x weekly - 1 sets - 10 reps  Supine Hip Abduction on Slider - 2 x daily - 6 x weekly - 1 sets - 10 reps  Standing Hip Abduction AROM - 2 x daily - 6 x weekly - 1 sets - 10 reps  Squat with Counter Support - 2 x daily - 6 x weekly - 1 sets - 10 reps    ASSESSMENT     Today's session focused on: 1. Assessment of STGs, f/b TE to restore hip motion and improve the mm function of the R LE. Continuing SBA due to episodic stumbling w closed chain activities. Patient reports/demonstrates the following improvements:  Hip AROM: flexion to 93 (+8), ER to 12 (+4). Function per HOS improved 26 percentage points from IE. Strength of R hip abductors improved 1 grade to 4/5. The following impairments are present:  R hip discomfort  Limited R hip ROM - P & A  Hip strength  Balance - static & dynamic    These impairments result in the following activity limitations:  Wbing ADLs involving - standing, walking > 15 min, squatting, kneeling, lifting, carrying, negotiating stairs or curbs, light or heavy work and recreational activities. PT GOAL(S) ATTAINED:  Patient will voice understanding of symptom limited activity principle. Patient will report maintaining pain ? 4/10 w symptom limited activity, use of modalities of cold and/or heat, positioning, meds and exercise. Patient will report compliance w home program of exercise, cold and elevation. Patient will demonstrate independent skill w initial HEP. Improve HOS score to ? 50% limitation. PT GOAL(S) NOT ATTAINED      PLAN     Evaluate response to session. Complete STEADI  Progress TE and MT to restore hip function. GOALS      SHORT - TERM GOALS TO BE ATTAINED BY 2/13/23:  Improve HOS score to ? 20% limitation. LONG - TERM GOALS TO BE ATTAINED BY 3/10/23:  Improve HOS score to ? 20% limitation.   Patient will return to normal sleep pattern re quality and duration.  Patient will demonstrate independent skill w maintenance exercise program.  Patient will attain asymptomatic hip function that allows patient to perform ADLs involving FBing, lifting, carrying, reaching and twisting.  Patient will tolerate sustained position of sitting for ? 60 min for self - care/dressing/grooming, driving, travel, work, family or social activities.  Patient will tolerate sustained position of standing for ? 45 min for self - care/dressing/grooming, preparing meals/cleaning home, work, family or social activities.  Patient will tolerate walking for as necessary for travel, work, family or social activities.  Patient will be able to lift from the floor 20 lbs for travel, work, family or social activities.  Patient will attain hip function - ROM, mm strength - endurance, coordination of movement - that allows patient to resume desired activities/attain patient goals of: resuming training for the [employer].     [employer]

## 2023-01-16 ENCOUNTER — OFFICE VISIT (OUTPATIENT)
Age: 88
End: 2023-01-16
Payer: MEDICARE

## 2023-01-16 DIAGNOSIS — M25.559 HIP PAIN: ICD-10-CM

## 2023-01-16 DIAGNOSIS — Z96.641 STATUS POST RIGHT HIP REPLACEMENT: Primary | ICD-10-CM

## 2023-01-16 DIAGNOSIS — R29.898 IMPAIRED STRENGTH OF HIP MUSCLES: ICD-10-CM

## 2023-01-16 DIAGNOSIS — R68.89 DECREASED ACTIVITY TOLERANCE: ICD-10-CM

## 2023-01-16 DIAGNOSIS — Z78.9 DECREASED ACTIVITIES OF DAILY LIVING (ADL): ICD-10-CM

## 2023-01-16 DIAGNOSIS — M25.651 IMPAIRED RANGE OF MOTION OF RIGHT HIP: ICD-10-CM

## 2023-01-16 DIAGNOSIS — Z74.09 IMPAIRED FUNCTIONAL MOBILITY, BALANCE, GAIT, AND ENDURANCE: ICD-10-CM

## 2023-01-16 PROCEDURE — 97530 THERAPEUTIC ACTIVITIES: CPT | Performed by: PHYSICAL THERAPIST

## 2023-01-16 PROCEDURE — 97110 THERAPEUTIC EXERCISES: CPT | Performed by: PHYSICAL THERAPIST

## 2023-01-16 NOTE — PROGRESS NOTES
NIMESH MARTINS [facility]  [address] [facility] [address]  Dept: [phone]    PHYSICAL THERAPY DAILY NOTE     DATE of EVAL: 12/16/22    POC ENDS: 3/10/23    PT SESSION: 10 of 20    DATE of LAST PROGRESS NOTE: 1/16/23    DATE of SURGERY: Fri. 11/18/22  WEEKS POST OP: 8+    TOTAL TIMED PROCEDURE CODES: 45 MIN.  TOTAL TIME: 45 MIN.    REFERRING PROVIDER: Mil Mustafa MD  DIAGNOSIS:     ICD-10-CM    1. Status post right hip replacement  Z96.641       2. Hip pain  M25.559       3. Impaired range of motion of right hip  M25.651       4. Impaired strength of hip muscles  R29.898       5. Impaired functional mobility, balance, gait, and endurance  Z74.09       6. Decreased activities of daily living (ADL)  Z78.9       7. Decreased activity tolerance  R68.89          THERAPY PRECAUTIONS:None  CO - MORBIDITIES AFFECTING PLAN OF CARE: None    PERTINENT MEDICAL HISTORY     Past Medical History:   Diagnosis Date    Arthritis     mild    Arthropathy, unspecified, site unspecified 7/8/2015    Back pain     Chronic renal insufficiency     pt denies 10/25/22    Diverticulosis of large intestine without diverticulitis 2016    Essential hypertension, benign 7/8/2015    managed with medication    Full dentures     GERD (gastroesophageal reflux disease)     hx-no medication at this time (noted 10/25/22)    Hand pain, left     Hip pain, right     Hx of colonic polyps 2016    adenoma    Osteoarthritis of finger of left hand     Other abnormal glucose 7/8/2015    Other and unspecified hyperlipidemia 7/8/2015    Shoulder pain         HISTORY RELATED TO PRESENT COMPLAINT      CHIEF COMPLAINTS: Stiff R hip. Cant bend over to tie shoes, etc.     HISTORY OF INJURY:   2 - 3 hx of R hip pain w hx of falls prior to MARCELL.  Date symptoms began: Fri. 11/18/22  Nature of condition: Recent onset (initial onset within last 3 months)  Primary cause of current episode: Post-surgical  How did symptoms start: MARCELL  Describe  current symptoms: Stiff R hip; aching, spasms. Received previous outpatient physical therapy for this problem? Yes; Number of therapy visits in the last 90 days: 8     PAIN ASSESSMENT:  Pain location: R anterior thigh pain    Current (0-10 pain scale): 0/10  Average Pain/symptom intensity (0-10 scale)  Last 24 hours: 2/10  Last week (1-7 days): 2/10  How often do you feel symptoms? Occasionally (26-50%)  Description: aching and spasm  Aggravating factors: Movement related turning LE a certain way  Alleviating factors: Rest, modified activities, Tylenol. NEURO SCREEN: denies numbness, tingling, and radiating pain     SOCIAL/FUNCTIONAL HISTORY:  How would you rate your overall health? very good  Pt lives with independent spouse in a(n) 1 story house with entry steps. 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 All hardware appears to be stable compared to immediate postoperative films. The acetabular component appears to be in good position, no evidence of loosening. Anteversion and inclination angle appear to be within acceptable criteria. The stem appears to be appropriately sized without any evidence of subsidence. No evidence of proximal femoral periprosthetic fracture. Hip is reduced. Impression: Normal Xray after total hip replacement     PATIENT STATED GOALS: Get back to playing senior softball and the senior games and to be able to walk proper again    SUBJECTIVE     Pt reports:  Tired.     OBJECTIVE     FINDINGS:  OBSERVATION:   POSTURE: The pelvis is level, LE alignment is symmetrical in the frontal and sagittal planes. SKIN INTEGRITY: Incision well healed           ATROPHY: R glute and thigh  PALPATION: Tender over the anterior thigh (RF) & posterolateral R hip. HOS FUNCTIONAL QUESTIONNAIRE 12/16/22 1/13/23   RAW SCORE 27/68 45/68   % FUNCTION 40 66   % DYSFUNCTION/LIMITATION 60 34      PAIN RATING (0 - 10) 12/16/22 12/23/22 12/29/22 1/4/23 1/13/23   PRE 2  0 0 1  ? 1   POST             LEFT (NON - INVOLVED) LQ AROM (?) RIGHT (1/13/23)   13 HIP ER (45°) 12 (+4)   13 HIP IR (45°) 19 (+1)   101 HIP FLEXION (120°) 93 (+8)   18 HIP ABDUCTION (45°) 15 (NC)   NT HIP EXTENSION (10 - 30°) NT   121 KNEE FLEXION (140°) 115(-7)      LEFT (NON - INVOLVED) LQ MMT (0 - 5) RIGHT (1/13/23)   5  HIP FLEXION 5    5 HIP ABDUCTION 4    5 HIP EXTENSION  NT    5 KNEE EXTENSION  5    5 KNEE FLEXION  5      Tool Used: STEADI  Test  1/16/23   TUG 8 seconds   30s Chair Stand 8 repetitions   4 Stage Balance Side by side: 10 seconds  Semi-tandem Right fwd: 10 seconds  Tandem Right fwd: 10 seconds  Single leg Right: 3 seconds   Interpretation of Score: The complete STEADI assessment is a measure of gait, strength, and balance used in conjunction with a fall risk screening to determine a person's risk and need for interventions. TUG: A score of greater than 14 seconds indicates an increased risk for falls. 35s Chair Stand: Scores related to fall risk are adjusted for age and gender; a person is at increased risk of falling if the complete:  Age Men Women   60-64 <14 <12   65-69 <12 <11   70-74 <12 <10   75-79 <11 <10   80-84 <10 <9   85-89 <8 <8   90-94 <7 <4     4 Stage Balance: If a person can hold a position for 10 seconds without moving their feet or needing support, go on to the  next position; if not, STOP the test. A person who cannot hold the tandem stand for at least 10 seconds is at increased risk of falling. TREATMENT PROVIDED TODAY:  36482 - THERAPEUTIC ACTIVITY: 15 MIN.   Completion, interpretation and review of STEADI results w patient . PATIENT EDUCATION: PT supervised all exercises utilizing verbal and tactile cues as needed to correct technique. 13824 - THERAPEUTIC EXERCISE: 30 MIN. To address pain and deficits related to ROM and mm function - force production, endurance, neuromuscular coordination for completing ADLs. Hip AROM reassessed. EXERCISES:  Stationary bike (seat position/intensity/duration): 6/3/5    Standing:  R/L hip hikin/10  *Lateral steppin cycles/10 m - Lt Bl R: Loop just above the knees   *Monster walk: 2 cycles /10 m - Lt Bl R: Loop just above the knees  *SBA due to occasional stumbles    Side lying:  R hip abduction:1/10 - Lt Bl R: Loop just above the knees   R clamshell: 1/10 - Lt Bl R: Loop just above the knees     Prone:  Knee flexion  Hip ER stretch  Hip extension stretch    Supine:   Bridging - two position: 2/15 - Green R: Loop just above the knees  Bent knee fall out:  - Green R: Loop just above the knees  Anterior hip/rectus femoris stretch - LE dropped off edge of plinth:  min. PT directed stretching of hip:  -ER  - flexion (mindful of healing restrictions)    Access Code: [email or web address]  URL: [email or web address]  Date: 2022  Prepared by: Brendon Rater     Exercises  Supine Heel Slide - 2 x daily - 6 x weekly - 1 sets - 1 - 2 minutes duration:  Supine Bridge - 2 x daily - 6 x weekly - 1 sets - 5 reps  Supine Single Bent Knee Fallout - 2 x daily - 6 x weekly - 1 sets - 10 reps  Supine Hip Abduction on Slider - 2 x daily - 6 x weekly - 1 sets - 10 reps  Standing Hip Abduction AROM - 2 x daily - 6 x weekly - 1 sets - 10 reps  Squat with Counter Support - 2 x daily - 6 x weekly - 1 sets - 10 reps    ASSESSMENT     Today's session focused on functional assessment w STECATHY f/b TE to restore hip motion and improve the mm function of the R LE.     PROGRESS NOTE:  10 PT sessions to date consisting of ROM, strength-endurance exercises including closed chain exercises. STEADI result indicates patient is not at high risk for falling. Patient reports/demonstrates the following improvements:  Hip AROM: flexion to 93 (+8), ER to 12 (+4). Function per HOS improved 26 percentage points from IE. Strength of R hip abductors improved 1 grade to 4/5. STEADI - See above. Ambulates w/o assistive device. The following impairments are present:  R hip discomfort  Limited R hip ROM - P & A  Hip strength  Balance - static & dynamic    These impairments result in the following activity limitations:  Wbing ADLs involving - standing, walking > 15 min, squatting, kneeling, lifting, carrying, negotiating stairs or curbs, light or heavy work and recreational activities. PT GOAL(S) ATTAINED:  Patient will voice understanding of symptom limited activity principle. Patient will report maintaining pain ? 4/10 w symptom limited activity, use of modalities of cold and/or heat, positioning, meds and exercise. Patient will report compliance w home program of exercise, cold and elevation. Patient will demonstrate independent skill w initial HEP. Improve HOS score to ? 50% limitation. PT GOAL(S) NOT ATTAINED      PLAN     Evaluate response to session. Progress TE and MT to restore hip function. GOALS      SHORT - TERM GOALS TO BE ATTAINED BY 2/13/23:  Improve HOS score to ? 20% limitation. LONG - TERM GOALS TO BE ATTAINED BY 3/10/23:  Improve HOS score to ? 20% limitation. Patient will return to normal sleep pattern re quality and duration. Patient will demonstrate independent skill w maintenance exercise program.  Patient will attain asymptomatic hip function that allows patient to perform ADLs involving FBing, lifting, carrying, reaching and twisting. Patient will tolerate sustained position of sitting for ? 60 min for self - care/dressing/grooming, driving, travel, work, family or social activities.   Patient will tolerate sustained position of standing for ? 45 min for self - care/dressing/grooming, preparing meals/cleaning home, work, family or social activities. Patient will tolerate walking for as necessary for travel, work, family or social activities. Patient will be able to lift from the floor 20 lbs for travel, work, family or social activities. Patient will attain hip function - ROM, mm strength - endurance, coordination of movement - that allows patient to resume desired activities/attain patient goals of: resuming training for the [employer].      Ivania Mix

## 2023-01-19 ENCOUNTER — OFFICE VISIT (OUTPATIENT)
Age: 88
End: 2023-01-19
Payer: MEDICARE

## 2023-01-19 DIAGNOSIS — Z78.9 DECREASED ACTIVITIES OF DAILY LIVING (ADL): ICD-10-CM

## 2023-01-19 DIAGNOSIS — Z74.09 IMPAIRED FUNCTIONAL MOBILITY, BALANCE, GAIT, AND ENDURANCE: ICD-10-CM

## 2023-01-19 DIAGNOSIS — M25.559 HIP PAIN: ICD-10-CM

## 2023-01-19 DIAGNOSIS — Z96.641 STATUS POST RIGHT HIP REPLACEMENT: Primary | ICD-10-CM

## 2023-01-19 DIAGNOSIS — R29.898 IMPAIRED STRENGTH OF HIP MUSCLES: ICD-10-CM

## 2023-01-19 DIAGNOSIS — R68.89 DECREASED ACTIVITY TOLERANCE: ICD-10-CM

## 2023-01-19 DIAGNOSIS — M25.651 IMPAIRED RANGE OF MOTION OF RIGHT HIP: ICD-10-CM

## 2023-01-19 PROCEDURE — 97140 MANUAL THERAPY 1/> REGIONS: CPT | Performed by: PHYSICAL THERAPIST

## 2023-01-19 PROCEDURE — 97110 THERAPEUTIC EXERCISES: CPT | Performed by: PHYSICAL THERAPIST

## 2023-01-19 NOTE — PROGRESS NOTES
[address] [facility]  [address] [address]  Dept: [phone]    PHYSICAL THERAPY DAILY NOTE     DATE of EVAL: 12/16/22    POC ENDS: 3/10/23    PT SESSION: 11 of 20    DATE of LAST PROGRESS NOTE: 1/16/23    DATE of SURGERY: Fri. 11/18/22  WEEKS POST OP: 8+    TOTAL TIMED PROCEDURE CODES: 30 MIN. TOTAL TIME: 30 MIN. REFERRING PROVIDER: Mary Jennings MD  DIAGNOSIS:     ICD-10-CM    1. Status post right hip replacement  Z96.641       2. Hip pain  M25.559       3. Impaired range of motion of right hip  M25.651       4. Impaired strength of hip muscles  R29.898       5. Impaired functional mobility, balance, gait, and endurance  Z74.09       6. Decreased activities of daily living (ADL)  Z78.9       7. Decreased activity tolerance  R68.89          THERAPY PRECAUTIONS:None  CO - MORBIDITIES AFFECTING PLAN OF CARE: None    PERTINENT MEDICAL HISTORY     Past Medical History:   Diagnosis Date    Arthritis     mild    Arthropathy, unspecified, site unspecified 7/8/2015    Back pain     Chronic renal insufficiency     pt denies 10/25/22    Diverticulosis of large intestine without diverticulitis 2016    Essential hypertension, benign 7/8/2015    managed with medication    Full dentures     GERD (gastroesophageal reflux disease)     hx-no medication at this time (noted 10/25/22)    Hand pain, left     Hip pain, right     Hx of colonic polyps 2016    adenoma    Osteoarthritis of finger of left hand     Other abnormal glucose 7/8/2015    Other and unspecified hyperlipidemia 7/8/2015    Shoulder pain         HISTORY RELATED TO PRESENT COMPLAINT      CHIEF COMPLAINTS: Stiff R hip. Cant bend over to tie shoes, etc.     HISTORY OF INJURY:   2 - 3 hx of R hip pain w hx of falls prior to MARCELL.   Date symptoms began: Fri. 11/18/22  Hanna Frames of condition: Recent onset (initial onset within last 3 months)  Primary cause of current episode: Post-surgical  How did symptoms start: MARCELL  Describe current symptoms: Stiff R hip; aching, spasms. Received previous outpatient physical therapy for this problem? Yes; Number of therapy visits in the last 90 days: 8     PAIN ASSESSMENT:  Pain location: R anterior thigh pain    Current (0-10 pain scale): 0/10  Average Pain/symptom intensity (0-10 scale)  Last 24 hours: 2/10  Last week (1-7 days): 2/10  How often do you feel symptoms? Occasionally (26-50%)  Description: aching and spasm  Aggravating factors: Movement related turning LE a certain way  Alleviating factors: Rest, modified activities, Tylenol. NEURO SCREEN: denies numbness, tingling, and radiating pain     SOCIAL/FUNCTIONAL HISTORY:  How would you rate your overall health? very good  Pt lives with independent spouse in a(n) 1 story house with entry steps. 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 All hardware appears to be stable compared to immediate postoperative films. The acetabular component appears to be in good position, no evidence of loosening. Anteversion and inclination angle appear to be within acceptable criteria. The stem appears to be appropriately sized without any evidence of subsidence. No evidence of proximal femoral periprosthetic fracture. Hip is reduced. Impression: Normal Xray after total hip replacement     PATIENT STATED GOALS: Get back to playing senior softball and the senior games and to be able to walk proper again    SUBJECTIVE     Pt reports:  CC upper thigh mm.     OBJECTIVE     FINDINGS:  OBSERVATION:   POSTURE: The pelvis is level, LE alignment is symmetrical in the frontal and sagittal planes. SKIN INTEGRITY: Incision well healed           ATROPHY: R glute and thigh  PALPATION: Tender over the anterior thigh (RF) & posterolateral R hip. HOS FUNCTIONAL QUESTIONNAIRE 12/16/22 1/13/23   RAW SCORE 27/68 45/68   % FUNCTION 40 66   % DYSFUNCTION/LIMITATION 60 34      PAIN RATING (0 - 10) 12/16/22 12/23/22 12/29/22 1/4/23 1/13/23   PRE 2  0 0 1  ? 1   POST             LEFT (NON - INVOLVED) LQ AROM (?) RIGHT (1/13/23)   13 HIP ER (45°) 12 (+4)   13 HIP IR (45°) 19 (+1)   101 HIP FLEXION (120°) 93 (+8)   18 HIP ABDUCTION (45°) 15 (NC)   NT HIP EXTENSION (10 - 30°) NT   121 KNEE FLEXION (140°) 115(-7)      LEFT (NON - INVOLVED) LQ MMT (0 - 5) RIGHT (1/13/23)   5  HIP FLEXION 5    5 HIP ABDUCTION 4    5 HIP EXTENSION  NT    5 KNEE EXTENSION  5    5 KNEE FLEXION  5      Tool Used: STEADI  Test  1/16/23   TUG 8 seconds   30s Chair Stand 8 repetitions   4 Stage Balance Side by side: 10 seconds  Semi-tandem Right fwd: 10 seconds  Tandem Right fwd: 10 seconds  Single leg Right: 3 seconds   Interpretation of Score: The complete STEADI assessment is a measure of gait, strength, and balance used in conjunction with a fall risk screening to determine a person's risk and need for interventions. TUG: A score of greater than 14 seconds indicates an increased risk for falls. 35s Chair Stand: Scores related to fall risk are adjusted for age and gender; a person is at increased risk of falling if the complete:  Age Men Women   60-64 <14 <12   65-69 <12 <11   70-74 <12 <10   75-79 <11 <10   80-84 <10 <9   85-89 <8 <8   90-94 <7 <4     4 Stage Balance: If a person can hold a position for 10 seconds without moving their feet or needing support, go on to the  next position; if not, STOP the test. A person who cannot hold the tandem stand for at least 10 seconds is at increased risk of falling.      TREATMENT PROVIDED TODAY:      PATIENT EDUCATION: PT supervised all exercises utilizing verbal and tactile cues as needed to correct technique. 62539 - MANUAL THERAPY: 15 MIN. PT utilized the following techniques to relieve pain, restore muscle length, restore joint accessory motion to improve PROM, AROM for the restoration of function. IASTM to the R thigh, RF. PT directed stretching of the R quadriceps to increase R hip extension. 97137 - THERAPEUTIC EXERCISE: 15 MIN. To address pain and deficits related to ROM and mm function - force production, endurance, neuromuscular coordination for completing ADLs. EXERCISES:    Standing:  R/L hip hikin/10  *R/L hip extension -  standing FBnt over plinth: 2/10 - Green R: Loop just above the knees  *Lateral steppin cycles/10 m - Green R: Loop just above the knees   *SBA due to occasional stumbles    Side lying:  PT directed R hip flexor stretch to increase extension w knee flexed ~80°        Supine:   Anterior hip/rectus femoris stretch - LE dropped off edge of plinth: / min. Access Code: [email or web address]  URL: [email or web address]  Date: 2022  Prepared by: Brendon Rater     Exercises  Supine Heel Slide - 2 x daily - 6 x weekly - 1 sets - 1 - 2 minutes duration:  Supine Bridge - 2 x daily - 6 x weekly - 1 sets - 5 reps  Supine Single Bent Knee Fallout - 2 x daily - 6 x weekly - 1 sets - 10 reps  Supine Hip Abduction on Slider - 2 x daily - 6 x weekly - 1 sets - 10 reps  Standing Hip Abduction AROM - 2 x daily - 6 x weekly - 1 sets - 10 reps  Squat with Counter Support - 2 x daily - 6 x weekly - 1 sets - 10 reps    ASSESSMENT     Today's session focused on restoring R hip extension and improving the mm function of the R hip extensors. Patient reports/demonstrates the following improvements:  Post session patient reports freer  movement of the R hip into extension and a smoother gait.     The following impairments are present:  R hip discomfort  Limited R hip ROM - P & A  Hip strength  Balance - static & dynamic    These impairments result in the following activity limitations:  Wbing ADLs involving - standing, walking > 15 min, squatting, kneeling, lifting, carrying, negotiating stairs or curbs, light or heavy work and recreational activities. PT GOAL(S) ATTAINED:  Patient will voice understanding of symptom limited activity principle. Patient will report maintaining pain ? 4/10 w symptom limited activity, use of modalities of cold and/or heat, positioning, meds and exercise. Patient will report compliance w home program of exercise, cold and elevation. Patient will demonstrate independent skill w initial HEP. Improve HOS score to ? 50% limitation. PT GOAL(S) NOT ATTAINED      PLAN     Evaluate response to session. Progress TE and MT to restore hip function. GOALS      SHORT - TERM GOALS TO BE ATTAINED BY 2/13/23:  Improve HOS score to ? 20% limitation. LONG - TERM GOALS TO BE ATTAINED BY 3/10/23:  Improve HOS score to ? 20% limitation. Patient will return to normal sleep pattern re quality and duration. Patient will demonstrate independent skill w maintenance exercise program.  Patient will attain asymptomatic hip function that allows patient to perform ADLs involving FBing, lifting, carrying, reaching and twisting. Patient will tolerate sustained position of sitting for ? 60 min for self - care/dressing/grooming, driving, travel, work, family or social activities. Patient will tolerate sustained position of standing for ? 45 min for self - care/dressing/grooming, preparing meals/cleaning home, work, family or social activities. Patient will tolerate walking for as necessary for travel, work, family or social activities. Patient will be able to lift from the floor 20 lbs for travel, work, family or social activities.   Patient will attain hip function - ROM, mm strength - endurance, coordination of movement - that allows patient to resume desired activities/attain patient goals of: resuming training for the [employer].      [address] [facility]

## 2023-01-24 ENCOUNTER — OFFICE VISIT (OUTPATIENT)
Age: 88
End: 2023-01-24
Payer: MEDICARE

## 2023-01-24 DIAGNOSIS — R29.898 IMPAIRED STRENGTH OF HIP MUSCLES: ICD-10-CM

## 2023-01-24 DIAGNOSIS — Z74.09 IMPAIRED FUNCTIONAL MOBILITY, BALANCE, GAIT, AND ENDURANCE: ICD-10-CM

## 2023-01-24 DIAGNOSIS — R68.89 DECREASED ACTIVITY TOLERANCE: ICD-10-CM

## 2023-01-24 DIAGNOSIS — Z78.9 DECREASED ACTIVITIES OF DAILY LIVING (ADL): ICD-10-CM

## 2023-01-24 DIAGNOSIS — M25.651 IMPAIRED RANGE OF MOTION OF RIGHT HIP: ICD-10-CM

## 2023-01-24 DIAGNOSIS — M25.559 HIP PAIN: ICD-10-CM

## 2023-01-24 DIAGNOSIS — Z96.641 STATUS POST RIGHT HIP REPLACEMENT: Primary | ICD-10-CM

## 2023-01-24 PROCEDURE — 97110 THERAPEUTIC EXERCISES: CPT | Performed by: PHYSICAL THERAPIST

## 2023-01-24 PROCEDURE — 97140 MANUAL THERAPY 1/> REGIONS: CPT | Performed by: PHYSICAL THERAPIST

## 2023-01-24 NOTE — PROGRESS NOTES
[address] [facility]  [address] [facility] [address]  Dept: [phone]    PHYSICAL THERAPY DAILY NOTE     DATE of EVAL: 12/16/22    POC ENDS: 3/10/23    PT SESSION: 11 of 20    DATE of LAST PROGRESS NOTE: 1/16/23    DATE of SURGERY: Fri. 11/18/22  WEEKS POST OP: 9+    TOTAL TIMED PROCEDURE CODES: 39 MIN. TOTAL TIME: 45 MIN. REFERRING PROVIDER: Marcelino Vera MD  DIAGNOSIS:     ICD-10-CM    1. Status post right hip replacement  Z96.641       2. Hip pain  M25.559       3. Impaired range of motion of right hip  M25.651       4. Impaired strength of hip muscles  R29.898       5. Impaired functional mobility, balance, gait, and endurance  Z74.09       6. Decreased activities of daily living (ADL)  Z78.9       7. Decreased activity tolerance  R68.89            THERAPY PRECAUTIONS:None  CO - MORBIDITIES AFFECTING PLAN OF CARE: None    PERTINENT MEDICAL HISTORY     Past Medical History:   Diagnosis Date    Arthritis     mild    Arthropathy, unspecified, site unspecified 7/8/2015    Back pain     Chronic renal insufficiency     pt denies 10/25/22    Diverticulosis of large intestine without diverticulitis 2016    Essential hypertension, benign 7/8/2015    managed with medication    Full dentures     GERD (gastroesophageal reflux disease)     hx-no medication at this time (noted 10/25/22)    Hand pain, left     Hip pain, right     Hx of colonic polyps 2016    adenoma    Osteoarthritis of finger of left hand     Other abnormal glucose 7/8/2015    Other and unspecified hyperlipidemia 7/8/2015    Shoulder pain         HISTORY RELATED TO PRESENT COMPLAINT      CHIEF COMPLAINTS: Stiff R hip. Cant bend over to tie shoes, etc.     HISTORY OF INJURY:   2 - 3 hx of R hip pain w hx of falls prior to MARCELL.   Date symptoms began: Fri. 11/18/22  Everette Elizabeth of condition: Recent onset (initial onset within last 3 months)  Primary cause of current episode: Post-surgical  How did symptoms start: MARCELL  Describe current symptoms: Stiff R hip; aching, spasms. Received previous outpatient physical therapy for this problem? Yes; Number of therapy visits in the last 90 days: 8     PAIN ASSESSMENT:  Pain location: R anterior thigh pain    Current (0-10 pain scale): 0/10  Average Pain/symptom intensity (0-10 scale)  Last 24 hours: 2/10  Last week (1-7 days): 2/10  How often do you feel symptoms? Occasionally (26-50%)  Description: aching and spasm  Aggravating factors: Movement related turning LE a certain way  Alleviating factors: Rest, modified activities, Tylenol. NEURO SCREEN: denies numbness, tingling, and radiating pain     SOCIAL/FUNCTIONAL HISTORY:  How would you rate your overall health? very good  Pt lives with independent spouse in a(n) 1 story house with entry steps. 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 All hardware appears to be stable compared to immediate postoperative films. The acetabular component appears to be in good position, no evidence of loosening. Anteversion and inclination angle appear to be within acceptable criteria. The stem appears to be appropriately sized without any evidence of subsidence. No evidence of proximal femoral periprosthetic fracture. Hip is reduced. Impression: Normal Xray after total hip replacement     PATIENT STATED GOALS: Get back to playing senior softball and the senior games and to be able to walk proper again    SUBJECTIVE     Pt reports:  CC upper thigh mm.     OBJECTIVE     FINDINGS:  OBSERVATION:   POSTURE: The pelvis is level, LE alignment is symmetrical in the frontal and sagittal planes. SKIN INTEGRITY: Incision well healed           ATROPHY: R glute and thigh  PALPATION: Tender over the anterior thigh (RF) & posterolateral R hip. HOS FUNCTIONAL QUESTIONNAIRE 12/16/22 1/13/23   RAW SCORE 27/68 45/68   % FUNCTION 40 66   % DYSFUNCTION/LIMITATION 60 34      PAIN RATING (0 - 10) 12/16/22 12/23/22 12/29/22 1/4/23 1/13/23   PRE 2  0 0 1  ? 1   POST             LEFT (NON - INVOLVED) LQ AROM (?) RIGHT (1/13/23)   13 HIP ER (45°) 12 (+4)   13 HIP IR (45°) 19 (+1)   101 HIP FLEXION (120°) 93 (+8)   18 HIP ABDUCTION (45°) 15 (NC)   NT HIP EXTENSION (10 - 30°) NT   121 KNEE FLEXION (140°) 115(-7)      LEFT (NON - INVOLVED) LQ MMT (0 - 5) RIGHT (1/13/23)   5  HIP FLEXION 5    5 HIP ABDUCTION 4    5 HIP EXTENSION  NT    5 KNEE EXTENSION  5    5 KNEE FLEXION  5      Tool Used: STEADI  Test  1/16/23   TUG 8 seconds   30s Chair Stand 8 repetitions   4 Stage Balance Side by side: 10 seconds  Semi-tandem Right fwd: 10 seconds  Tandem Right fwd: 10 seconds  Single leg Right: 3 seconds   Interpretation of Score: The complete STEADI assessment is a measure of gait, strength, and balance used in conjunction with a fall risk screening to determine a person's risk and need for interventions. TUG: A score of greater than 14 seconds indicates an increased risk for falls. 35s Chair Stand: Scores related to fall risk are adjusted for age and gender; a person is at increased risk of falling if the complete:  Age Men Women   60-64 <14 <12   65-69 <12 <11   70-74 <12 <10   75-79 <11 <10   80-84 <10 <9   85-89 <8 <8   90-94 <7 <4     4 Stage Balance: If a person can hold a position for 10 seconds without moving their feet or needing support, go on to the  next position; if not, STOP the test. A person who cannot hold the tandem stand for at least 10 seconds is at increased risk of falling.      TREATMENT PROVIDED TODAY:      PATIENT EDUCATION: PT supervised all exercises utilizing verbal and tactile cues as needed to correct technique. 39621 - MANUAL THERAPY: 10 MIN. PT utilized the following techniques to relieve pain, restore muscle length, restore joint accessory motion to improve PROM, AROM for the restoration of function. IASTM to the R thigh, RF. PT directed stretching of the R quadriceps to increase R hip extension. 80296 - THERAPEUTIC EXERCISE: 35 MIN. To address pain and deficits related to ROM and mm function - force production, endurance, neuromuscular coordination for completing ADLs. EXERCISES:    Standing:  R/L hip hikin/10  *SBA due to occasional stumbles    Side lying:  PT directed R hip flexor stretch to increase extension w knee flexed ~80°    R hip abduction:1/10 - Lt Bl R: Loop just above the knees   R clamshell: 1/10 - Lt Bl R: Loop just above the knees     Prone:  Knee flexion  Hip ER stretch  Hip extension stretch    Supine:   PT directed stretching of hip:  -ER  - abduction  - flexion (mindful of healing restrictions)    Anterior hip/rectus femoris stretch - LE dropped off edge of plinth: / min. Bridging - two position (neutral - open):  - Green R: Loop just above the knees  R/L Bent knee fall out:  - Green R: Loop just above the knees  R Hip abduction  sec. - Lt Bl R: Loop @ ankles        Access Code: [email or web address]  URL: [email or web address]  Date: 2022  Prepared by: Geri Luong     Exercises  Supine Heel Slide - 2 x daily - 6 x weekly - 1 sets - 1 - 2 minutes duration:  Supine Bridge - 2 x daily - 6 x weekly - 1 sets - 5 reps  Supine Single Bent Knee Fallout - 2 x daily - 6 x weekly - 1 sets - 10 reps  Supine Hip Abduction on Slider - 2 x daily - 6 x weekly - 1 sets - 10 reps  Standing Hip Abduction AROM - 2 x daily - 6 x weekly - 1 sets - 10 reps  Squat with Counter Support - 2 x daily - 6 x weekly - 1 sets - 10 reps    ASSESSMENT     Today's session focused on restoring R hip extension and improving the mm function of the R hip extensors. Patient reports/demonstrates the following improvements:  Post session patient reports freer  movement of the R hip into extension and a smoother gait. The following impairments are present:  R hip discomfort  Limited R hip ROM - P & A  Hip strength  Balance - static & dynamic    These impairments result in the following activity limitations:  Wbing ADLs involving - standing, walking > 15 min, squatting, kneeling, lifting, carrying, negotiating stairs or curbs, light or heavy work and recreational activities. PT GOAL(S) ATTAINED:  Patient will voice understanding of symptom limited activity principle. Patient will report maintaining pain ? 4/10 w symptom limited activity, use of modalities of cold and/or heat, positioning, meds and exercise. Patient will report compliance w home program of exercise, cold and elevation. Patient will demonstrate independent skill w initial HEP. Improve HOS score to ? 50% limitation. PT GOAL(S) NOT ATTAINED      PLAN     Evaluate response to session. Progress TE and MT to restore hip function. GOALS      SHORT - TERM GOALS TO BE ATTAINED BY 2/13/23:  Improve HOS score to ? 20% limitation. LONG - TERM GOALS TO BE ATTAINED BY 3/10/23:  Improve HOS score to ? 20% limitation. Patient will return to normal sleep pattern re quality and duration. Patient will demonstrate independent skill w maintenance exercise program.  Patient will attain asymptomatic hip function that allows patient to perform ADLs involving FBing, lifting, carrying, reaching and twisting. Patient will tolerate sustained position of sitting for ? 60 min for self - care/dressing/grooming, driving, travel, work, family or social activities. Patient will tolerate sustained position of standing for ? 45 min for self - care/dressing/grooming, preparing meals/cleaning home, work, family or social activities.   Patient will tolerate walking for as necessary for travel, work, family or social activities. Patient will be able to lift from the floor 20 lbs for travel, work, family or social activities. Patient will attain hip function - ROM, mm strength - endurance, coordination of movement - that allows patient to resume desired activities/attain patient goals of: resuming training for the [employer].      Evan Quiñones

## 2023-01-26 ENCOUNTER — OFFICE VISIT (OUTPATIENT)
Age: 88
End: 2023-01-26
Payer: MEDICARE

## 2023-01-26 DIAGNOSIS — R29.898 IMPAIRED STRENGTH OF HIP MUSCLES: ICD-10-CM

## 2023-01-26 DIAGNOSIS — R68.89 DECREASED ACTIVITY TOLERANCE: ICD-10-CM

## 2023-01-26 DIAGNOSIS — M25.651 IMPAIRED RANGE OF MOTION OF RIGHT HIP: ICD-10-CM

## 2023-01-26 DIAGNOSIS — Z78.9 DECREASED ACTIVITIES OF DAILY LIVING (ADL): ICD-10-CM

## 2023-01-26 DIAGNOSIS — Z96.641 STATUS POST RIGHT HIP REPLACEMENT: Primary | ICD-10-CM

## 2023-01-26 DIAGNOSIS — Z74.09 IMPAIRED FUNCTIONAL MOBILITY, BALANCE, GAIT, AND ENDURANCE: ICD-10-CM

## 2023-01-26 DIAGNOSIS — M25.559 HIP PAIN: ICD-10-CM

## 2023-01-26 PROCEDURE — 97110 THERAPEUTIC EXERCISES: CPT | Performed by: PHYSICAL THERAPIST

## 2023-01-26 NOTE — PROGRESS NOTES
[address] [facility]  [address] [facility] [address]  Dept: [phone]    PHYSICAL THERAPY DAILY NOTE     DATE of EVAL: 12/16/22    POC ENDS: 3/10/23    PT SESSION: 12 of 20    DATE of LAST PROGRESS NOTE: 1/16/23    DATE of SURGERY: Fri. 11/18/22  WEEKS POST OP: 9+    TOTAL TIMED PROCEDURE CODES: 39 MIN. TOTAL TIME: 45 MIN. REFERRING PROVIDER: Anne Graves MD  DIAGNOSIS:     ICD-10-CM    1. Status post right hip replacement  Z96.641       2. Hip pain  M25.559       3. Impaired range of motion of right hip  M25.651       4. Impaired strength of hip muscles  R29.898       5. Impaired functional mobility, balance, gait, and endurance  Z74.09       6. Decreased activities of daily living (ADL)  Z78.9       7. Decreased activity tolerance  R68.89            THERAPY PRECAUTIONS:None  CO - MORBIDITIES AFFECTING PLAN OF CARE: None    PERTINENT MEDICAL HISTORY     Past Medical History:   Diagnosis Date    Arthritis     mild    Arthropathy, unspecified, site unspecified 7/8/2015    Back pain     Chronic renal insufficiency     pt denies 10/25/22    Diverticulosis of large intestine without diverticulitis 2016    Essential hypertension, benign 7/8/2015    managed with medication    Full dentures     GERD (gastroesophageal reflux disease)     hx-no medication at this time (noted 10/25/22)    Hand pain, left     Hip pain, right     Hx of colonic polyps 2016    adenoma    Osteoarthritis of finger of left hand     Other abnormal glucose 7/8/2015    Other and unspecified hyperlipidemia 7/8/2015    Shoulder pain         HISTORY RELATED TO PRESENT COMPLAINT      CHIEF COMPLAINTS: Stiff R hip. Cant bend over to tie shoes, etc.     HISTORY OF INJURY:   2 - 3 hx of R hip pain w hx of falls prior to MARCELL.   Date symptoms began: Fri. 11/18/22  Lura Hashimoto of condition: Recent onset (initial onset within last 3 months)  Primary cause of current episode: Post-surgical  How did symptoms start: MARCELL  Describe current symptoms: Stiff R hip; aching, spasms. Received previous outpatient physical therapy for this problem? Yes; Number of therapy visits in the last 90 days: 8     PAIN ASSESSMENT:  Pain location: R anterior thigh pain    Current (0-10 pain scale): 0/10  Average Pain/symptom intensity (0-10 scale)  Last 24 hours: 2/10  Last week (1-7 days): 2/10  How often do you feel symptoms? Occasionally (26-50%)  Description: aching and spasm  Aggravating factors: Movement related turning LE a certain way  Alleviating factors: Rest, modified activities, Tylenol. NEURO SCREEN: denies numbness, tingling, and radiating pain     SOCIAL/FUNCTIONAL HISTORY:  How would you rate your overall health? very good  Pt lives with independent spouse in a(n) 1 story house with entry steps. 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 All hardware appears to be stable compared to immediate postoperative films. The acetabular component appears to be in good position, no evidence of loosening. Anteversion and inclination angle appear to be within acceptable criteria. The stem appears to be appropriately sized without any evidence of subsidence. No evidence of proximal femoral periprosthetic fracture. Hip is reduced. Impression: Normal Xray after total hip replacement     PATIENT STATED GOALS: Get back to playing senior softball and the senior games and to be able to walk proper again    SUBJECTIVE     Pt reports:  Sore (indicates mid - upper thigh and lateral hip).     OBJECTIVE     FINDINGS:  OBSERVATION:   POSTURE: The pelvis is level, LE alignment is symmetrical in the frontal and sagittal planes. SKIN INTEGRITY: Incision well healed           ATROPHY: R glute and thigh  PALPATION: Tender over the anterior thigh (RF) & posterolateral R hip. Gait: Independent w/o assistive device. Antalgic. Tends to walk w flexed knees. HOS FUNCTIONAL QUESTIONNAIRE 12/16/22 1/13/23   RAW SCORE 27/68 45/68   % FUNCTION 40 66   % DYSFUNCTION/LIMITATION 60 34      PAIN RATING (0 - 10) 12/16/22 12/23/22 12/29/22 1/4/23 1/13/23   PRE 2  0 0 1  ? 1   POST             LEFT (NON - INVOLVED) LQ AROM (?) RIGHT (1/13/23)   13 HIP ER (45°) 12 (+4)   13 HIP IR (45°) 19 (+1)   101 HIP FLEXION (120°) 93 (+8)   18 HIP ABDUCTION (45°) 15 (NC)   NT HIP EXTENSION (10 - 30°) NT   121 KNEE FLEXION (140°) 115(-7)      LEFT (NON - INVOLVED) LQ MMT (0 - 5) RIGHT (1/13/23)   5  HIP FLEXION 5    5 HIP ABDUCTION 4    5 HIP EXTENSION  NT    5 KNEE EXTENSION  5    5 KNEE FLEXION  5      Tool Used: STEADI  Test  1/16/23   TUG 8 seconds   30s Chair Stand 8 repetitions   4 Stage Balance Side by side: 10 seconds  Semi-tandem Right fwd: 10 seconds  Tandem Right fwd: 10 seconds  Single leg Right: 3 seconds   Interpretation of Score: The complete STEADI assessment is a measure of gait, strength, and balance used in conjunction with a fall risk screening to determine a person's risk and need for interventions. TUG: A score of greater than 14 seconds indicates an increased risk for falls.   35s Chair Stand: Scores related to fall risk are adjusted for age and gender; a person is at increased risk of falling if the complete:  Age Men Women   60-64 <14 <12   65-69 <12 <11   70-74 <12 <10   75-79 <11 <10   80-84 <10 <9   85-89 <8 <8   90-94 <7 <4     4 Stage Balance: If a person can hold a position for 10 seconds without moving their feet or needing support, go on to the  next position; if not, STOP the test. A person who cannot hold the tandem stand for at least 10 seconds is at increased risk of falling. TREATMENT PROVIDED TODAY:      PATIENT EDUCATION: PT supervised all exercises utilizing verbal and tactile cues as needed to correct technique. 50698 - THERAPEUTIC EXERCISE: 45 MIN. To address pain and deficits related to ROM and mm function - force production, endurance, neuromuscular coordination for completing ADLs. EXERCISES:  Stationary bike (seat position/intensity/duration): 6/3/5    Standing:  R/L hip hikin/10  *R/L front step ups: Bl 3/10 - 0#, 5#, 7#. *SBA due to occasional stumbles    Side lying:  PT directed R hip flexor stretch to increase extension w knee flexed ~80°  R clamshell: 1/10 -     Supine:   PT directed stretching of hip:  -ER  - abduction  - flexion (mindful of healing restrictions)    Bridging - two position (neutral - open):  - Green R: Loop just above the knees  R/L Bent knee fall out:  - Green R: Loop just above the knees  R Hip abduction 2/30 sec. - Lt Bl R: Loop @ ankles        Access Code: [email or web address]  URL: [email or web address]  Date: 2022  Prepared by: Lisha Gallagher     Exercises  Supine Heel Slide - 2 x daily - 6 x weekly - 1 sets - 1 - 2 minutes duration:  Supine Bridge - 2 x daily - 6 x weekly - 1 sets - 5 reps  Supine Single Bent Knee Fallout - 2 x daily - 6 x weekly - 1 sets - 10 reps  Supine Hip Abduction on Slider - 2 x daily - 6 x weekly - 1 sets - 10 reps  Standing Hip Abduction AROM - 2 x daily - 6 x weekly - 1 sets - 10 reps  Squat with Counter Support - 2 x daily - 6 x weekly - 1 sets - 10 reps    ASSESSMENT     Today's session focused on restoring R hip extension and improving the mm function of the R hip extensors.     Patient reports/demonstrates the following improvements:  ---    The following impairments are present:  R hip discomfort  Limited R hip ROM - P & A  Hip strength  Balance - static & dynamic   Flexed knee gait    These impairments result in the following activity limitations:  Wbing ADLs involving - standing, walking > 15 min, squatting, kneeling, lifting, carrying, negotiating stairs or curbs, light or heavy work and recreational activities. PT GOAL(S) ATTAINED:  Patient will voice understanding of symptom limited activity principle. Patient will report maintaining pain ? 4/10 w symptom limited activity, use of modalities of cold and/or heat, positioning, meds and exercise. Patient will report compliance w home program of exercise, cold and elevation. Patient will demonstrate independent skill w initial HEP. Improve HOS score to ? 50% limitation. PT GOAL(S) NOT ATTAINED      PLAN     Evaluate response to session. Progress TE and MT to restore hip function. GOALS      SHORT - TERM GOALS TO BE ATTAINED BY 2/13/23:  Improve HOS score to ? 20% limitation. LONG - TERM GOALS TO BE ATTAINED BY 3/10/23:  Improve HOS score to ? 20% limitation. Patient will return to normal sleep pattern re quality and duration. Patient will demonstrate independent skill w maintenance exercise program.  Patient will attain asymptomatic hip function that allows patient to perform ADLs involving FBing, lifting, carrying, reaching and twisting. Patient will tolerate sustained position of sitting for ? 60 min for self - care/dressing/grooming, driving, travel, work, family or social activities. Patient will tolerate sustained position of standing for ? 45 min for self - care/dressing/grooming, preparing meals/cleaning home, work, family or social activities. Patient will tolerate walking for as necessary for travel, work, family or social activities. Patient will be able to lift from the floor 20 lbs for travel, work, family or social activities. Patient will attain hip function - ROM, mm strength - endurance, coordination of movement - that allows patient to resume desired activities/attain patient goals of: resuming training for the [employer].      Damian Damon

## 2023-01-30 ENCOUNTER — OFFICE VISIT (OUTPATIENT)
Age: 88
End: 2023-01-30
Payer: MEDICARE

## 2023-01-30 DIAGNOSIS — R29.898 IMPAIRED STRENGTH OF HIP MUSCLES: ICD-10-CM

## 2023-01-30 DIAGNOSIS — M25.651 IMPAIRED RANGE OF MOTION OF RIGHT HIP: ICD-10-CM

## 2023-01-30 DIAGNOSIS — Z78.9 DECREASED ACTIVITIES OF DAILY LIVING (ADL): ICD-10-CM

## 2023-01-30 DIAGNOSIS — Z74.09 IMPAIRED FUNCTIONAL MOBILITY, BALANCE, GAIT, AND ENDURANCE: ICD-10-CM

## 2023-01-30 DIAGNOSIS — R68.89 DECREASED ACTIVITY TOLERANCE: ICD-10-CM

## 2023-01-30 DIAGNOSIS — Z96.641 STATUS POST RIGHT HIP REPLACEMENT: Primary | ICD-10-CM

## 2023-01-30 DIAGNOSIS — M25.559 HIP PAIN: ICD-10-CM

## 2023-01-30 PROCEDURE — 97140 MANUAL THERAPY 1/> REGIONS: CPT | Performed by: PHYSICAL THERAPIST

## 2023-01-30 PROCEDURE — 97110 THERAPEUTIC EXERCISES: CPT | Performed by: PHYSICAL THERAPIST

## 2023-01-30 NOTE — PROGRESS NOTES
[facility]  [address] [facility],[address] [address]  Dept: [phone]    PHYSICAL THERAPY DAILY NOTE     DATE of EVAL: 12/16/22    POC ENDS: 3/10/23    PT SESSION: 13 of 20    DATE of LAST PROGRESS NOTE: 1/16/23    DATE of SURGERY: Fri. 11/18/22  WEEKS POST OP: 10    TOTAL TIMED PROCEDURE CODES: 48 MIN. TOTAL TIME: 48 MIN. REFERRING PROVIDER: Analilia Luis MD  DIAGNOSIS:     ICD-10-CM    1. Status post right hip replacement  Z96.641       2. Hip pain  M25.559       3. Impaired range of motion of right hip  M25.651       4. Impaired strength of hip muscles  R29.898       5. Impaired functional mobility, balance, gait, and endurance  Z74.09       6. Decreased activities of daily living (ADL)  Z78.9       7. Decreased activity tolerance  R68.89              THERAPY PRECAUTIONS:None  CO - MORBIDITIES AFFECTING PLAN OF CARE: None    PERTINENT MEDICAL HISTORY     Past Medical History:   Diagnosis Date    Arthritis     mild    Arthropathy, unspecified, site unspecified 7/8/2015    Back pain     Chronic renal insufficiency     pt denies 10/25/22    Diverticulosis of large intestine without diverticulitis 2016    Essential hypertension, benign 7/8/2015    managed with medication    Full dentures     GERD (gastroesophageal reflux disease)     hx-no medication at this time (noted 10/25/22)    Hand pain, left     Hip pain, right     Hx of colonic polyps 2016    adenoma    Osteoarthritis of finger of left hand     Other abnormal glucose 7/8/2015    Other and unspecified hyperlipidemia 7/8/2015    Shoulder pain         HISTORY RELATED TO PRESENT COMPLAINT      CHIEF COMPLAINTS: Stiff R hip. Cant bend over to tie shoes, etc.     HISTORY OF INJURY:   2 - 3 hx of R hip pain w hx of falls prior to MARCELL.   Date symptoms began: Fri. 11/18/22  Zandra  of condition: Recent onset (initial onset within last 3 months)  Primary cause of current episode: Post-surgical  How did symptoms start: MARCELL  Describe current symptoms: Stiff R hip; aching, spasms. Received previous outpatient physical therapy for this problem? Yes; Number of therapy visits in the last 90 days: 8     PAIN ASSESSMENT:  Pain location: R anterior thigh pain    Current (0-10 pain scale): 0/10  Average Pain/symptom intensity (0-10 scale)  Last 24 hours: 2/10  Last week (1-7 days): 2/10  How often do you feel symptoms? Occasionally (26-50%)  Description: aching and spasm  Aggravating factors: Movement related turning LE a certain way  Alleviating factors: Rest, modified activities, Tylenol. NEURO SCREEN: denies numbness, tingling, and radiating pain     SOCIAL/FUNCTIONAL HISTORY:  How would you rate your overall health? very good  Pt lives with independent spouse in a(n) 1 story house with entry steps. 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 All hardware appears to be stable compared to immediate postoperative films. The acetabular component appears to be in good position, no evidence of loosening. Anteversion and inclination angle appear to be within acceptable criteria. The stem appears to be appropriately sized without any evidence of subsidence. No evidence of proximal femoral periprosthetic fracture. Hip is reduced. Impression: Normal Xray after total hip replacement     PATIENT STATED GOALS: Get back to playing senior softball and the senior games and to be able to walk proper again    SUBJECTIVE     Pt reports:  Sore (indicates mid - upper thigh and lateral hip). Stiff.     OBJECTIVE     FINDINGS:  OBSERVATION: POSTURE: The pelvis is level, LE alignment is symmetrical in the frontal and sagittal planes. SKIN INTEGRITY: Incision well healed           ATROPHY: R glute and thigh  PALPATION: Tender over the anterior thigh (RF) & posterolateral R hip. Gait: Independent w/o assistive device. Antalgic. Tends to walk w flexed knees. HOS FUNCTIONAL QUESTIONNAIRE 12/16/22 1/13/23   RAW SCORE 27/68 45/68   % FUNCTION 40 66   % DYSFUNCTION/LIMITATION 60 34      PAIN RATING (0 - 10) 12/29/22 1/4/23 1/13/23    PRE 0 1  ? 1    POST          LEFT (NON - INVOLVED) RIGHT LQ AROM (?) 1/13/23 1/30/23   13 HIP ER (45°) 12 (+4) 17   13 HIP IR (45°) 19 (+1) 15   101 HIP FLEXION (120°) 93 (+8) 100   18 HIP ABDUCTION (45°) 15 (NC) 16   NT HIP EXTENSION (10 - 30°) NT NT   121 KNEE FLEXION (140°) 115(-7) 137      LEFT (NON - INVOLVED) LQ MMT (0 - 5) RIGHT (1/30/23)   5  HIP FLEXION 5    5 HIP ABDUCTION 5    5 HIP EXTENSION  NT    5 KNEE EXTENSION  5    5 KNEE FLEXION  5      Tool Used: STEADI  Test  1/16/23 1/30/23   TUG 8 seconds  10 sec   30s Chair Stand 8 repetitions    9 reps   4 Stage Balance Side by side: 10 seconds  Semi-tandem Right fwd: 10 seconds  Tandem Right fwd: 10 seconds  Single leg Right: 3 seconds Side by side: 10 seconds  Semi-tandem Right fwd: 10 seconds  Tandem Right fwd: 10 seconds  Single leg Right: 7seconds   Interpretation of Score: The complete STEADI assessment is a measure of gait, strength, and balance used in conjunction with a fall risk screening to determine a person's risk and need for interventions. TUG: A score of greater than 14 seconds indicates an increased risk for falls.   35s Chair Stand: Scores related to fall risk are adjusted for age and gender; a person is at increased risk of falling if the complete:  Age Men Women   60-64 <14 <12   65-69 <12 <11   70-74 <12 <10   75-79 <11 <10   80-84 <10 <9   85-89 <8 <8   90-94 <7 <4     4 Stage Balance: If a person can hold a position for 10 seconds without moving their feet or needing support, go on to the  next position; if not, STOP the test. A person who cannot hold the tandem stand for at least 10 seconds is at increased risk of falling. TREATMENT PROVIDED TODAY:    PATIENT EDUCATION: PT supervised all exercises utilizing verbal and tactile cues as needed to correct technique. 03327 - MANUAL THERAPY: 8 MIN. PT utilized the following techniques to relieve pain, restore muscle length, restore joint accessory motion to improve PROM, AROM for the restoration of function. IASTM to the proximal R thigh, RF.     77225 - THERAPEUTIC EXERCISE: 45 MIN. To address pain and deficits related to ROM and mm function - force production, endurance, neuromuscular coordination for completing ADLs. Hip AROM reassessed. EXERCISES:    Standing:  R/L hip hikin  *R/L front step ups: Bl 3/10 - 5#, 7#,*SBA due to occasional stumbles    Side lying:  R hip abduction:2/10 - R clamshell: 1/10 -     Supine:   PT directed stretching of hip:  - ER  - abduction  - flexion (mindful of healing restrictions)    Anterior hip/rectus femoris stretch - LE dropped off edge of plinth:  min. Bridging - two position (neutral - open):  - R/L Bent knee fall out:  - Green R: Loop just above the knees    Access Code: [email or web address]  URL: [email or web address]  Date: 2022  Prepared by: Rose Boyd     Exercises  Supine Heel Slide - 2 x daily - 6 x weekly - 1 sets - 1 - 2 minutes duration:  Supine Bridge - 2 x daily - 6 x weekly - 1 sets - 5 reps  Supine Single Bent Knee Fallout - 2 x daily - 6 x weekly - 1 sets - 10 reps  Supine Hip Abduction on Slider - 2 x daily - 6 x weekly - 1 sets - 10 reps  Standing Hip Abduction AROM - 2 x daily - 6 x weekly - 1 sets - 10 reps  Squat with Counter Support - 2 x daily - 6 x weekly - 1 sets - 10 reps    ASSESSMENT     Today's session focused on reassessing hip AROM & function w the MARI f/b stretching, ROM and strength endurance exercise w focus on restoring R hip extension and improving the mm function of the hip extensors. Moves w greater difficulty today consistent w c/o soreness, stiffness. Patient reports/demonstrates the following improvements:  Hip ER, abduction and knee flexion    The following impairments are present:  R hip discomfort  Limited R hip ROM - P & A  Hip strength  Balance - static & dynamic => fall risk    Flexed knee gait    These impairments result in the following activity limitations:  Wbing ADLs involving - standing, walking > 15 min, squatting, kneeling, lifting, carrying, negotiating stairs or curbs, light or heavy work and recreational activities. Fall risk. PT GOAL(S) ATTAINED:  Patient will voice understanding of symptom limited activity principle. Patient will report maintaining pain ? 4/10 w symptom limited activity, use of modalities of cold and/or heat, positioning, meds and exercise. Patient will report compliance w home program of exercise, cold and elevation. Patient will demonstrate independent skill w initial HEP. Improve HOS score to ? 50% limitation. PT GOAL(S) NOT ATTAINED      PLAN     Evaluate response to session. Progress TE and MT to restore hip function. GOALS      SHORT - TERM GOALS TO BE ATTAINED BY 2/13/23:  Improve HOS score to ? 20% limitation. LONG - TERM GOALS TO BE ATTAINED BY 3/10/23:  Improve HOS score to ? 20% limitation. Patient will return to normal sleep pattern re quality and duration. Patient will demonstrate independent skill w maintenance exercise program.  Patient will attain asymptomatic hip function that allows patient to perform ADLs involving FBing, lifting, carrying, reaching and twisting. Patient will tolerate sustained position of sitting for ? 60 min for self - care/dressing/grooming, driving, travel, work, family or social activities.   Patient will tolerate sustained position of standing for ? 45 min for self - care/dressing/grooming, preparing meals/cleaning home, work, family or social activities. Patient will tolerate walking for as necessary for travel, work, family or social activities. Patient will be able to lift from the floor 20 lbs for travel, work, family or social activities. Patient will attain hip function - ROM, mm strength - endurance, coordination of movement - that allows patient to resume desired activities/attain patient goals of: resuming training for the [employer].      [address] [facility] [place]

## 2023-02-02 ENCOUNTER — OFFICE VISIT (OUTPATIENT)
Age: 88
End: 2023-02-02
Payer: MEDICARE

## 2023-02-02 DIAGNOSIS — M25.559 HIP PAIN: ICD-10-CM

## 2023-02-02 DIAGNOSIS — Z78.9 DECREASED ACTIVITIES OF DAILY LIVING (ADL): ICD-10-CM

## 2023-02-02 DIAGNOSIS — R29.898 IMPAIRED STRENGTH OF HIP MUSCLES: ICD-10-CM

## 2023-02-02 DIAGNOSIS — R68.89 DECREASED ACTIVITY TOLERANCE: ICD-10-CM

## 2023-02-02 DIAGNOSIS — Z74.09 IMPAIRED FUNCTIONAL MOBILITY, BALANCE, GAIT, AND ENDURANCE: ICD-10-CM

## 2023-02-02 DIAGNOSIS — M25.651 IMPAIRED RANGE OF MOTION OF RIGHT HIP: ICD-10-CM

## 2023-02-02 DIAGNOSIS — Z96.641 STATUS POST RIGHT HIP REPLACEMENT: Primary | ICD-10-CM

## 2023-02-02 PROCEDURE — 97110 THERAPEUTIC EXERCISES: CPT | Performed by: PHYSICAL THERAPIST

## 2023-02-02 PROCEDURE — 97010 HOT OR COLD PACKS THERAPY: CPT | Performed by: PHYSICAL THERAPIST

## 2023-02-02 NOTE — PROGRESS NOTES
[address] [facility]  [address] [facility],[address] [address]  Dept: [phone]    PHYSICAL THERAPY DAILY NOTE     DATE of EVAL: 12/16/22    POC ENDS: 3/10/23    PT SESSION: 14 of 20    DATE of LAST PROGRESS NOTE: 1/16/23    DATE of SURGERY: Fri. 11/18/22  WEEKS POST OP: 10+    TOTAL TIMED PROCEDURE CODES: 48 MIN. TOTAL TIME: 60 MIN. REFERRING PROVIDER: Danni Ramirez MD  DIAGNOSIS:     ICD-10-CM    1. Status post right hip replacement  Z96.641       2. Hip pain  M25.559       3. Impaired range of motion of right hip  M25.651       4. Impaired strength of hip muscles  R29.898       5. Impaired functional mobility, balance, gait, and endurance  Z74.09       6. Decreased activities of daily living (ADL)  Z78.9       7. Decreased activity tolerance  R68.89         THERAPY PRECAUTIONS:None  CO - MORBIDITIES AFFECTING PLAN OF CARE: None    PERTINENT MEDICAL HISTORY     Past Medical History:   Diagnosis Date    Arthritis     mild    Arthropathy, unspecified, site unspecified 7/8/2015    Back pain     Chronic renal insufficiency     pt denies 10/25/22    Diverticulosis of large intestine without diverticulitis 2016    Essential hypertension, benign 7/8/2015    managed with medication    Full dentures     GERD (gastroesophageal reflux disease)     hx-no medication at this time (noted 10/25/22)    Hand pain, left     Hip pain, right     Hx of colonic polyps 2016    adenoma    Osteoarthritis of finger of left hand     Other abnormal glucose 7/8/2015    Other and unspecified hyperlipidemia 7/8/2015    Shoulder pain         HISTORY RELATED TO PRESENT COMPLAINT      CHIEF COMPLAINTS: Stiff R hip. Cant bend over to tie shoes, etc.     HISTORY OF INJURY:   2 - 3 hx of R hip pain w hx of falls prior to MARCELL.   Date symptoms began: Fri. 11/18/22  Herminia Buckner of condition: Recent onset (initial onset within last 3 months)  Primary cause of current episode: Post-surgical  How did symptoms start: MARCELL  Describe current symptoms: Stiff R hip; aching, spasms. Received previous outpatient physical therapy for this problem? Yes; Number of therapy visits in the last 90 days: 8     PAIN ASSESSMENT:  Pain location: R anterior thigh pain    Current (0-10 pain scale): 0/10  Average Pain/symptom intensity (0-10 scale)  Last 24 hours: 2/10  Last week (1-7 days): 2/10  How often do you feel symptoms? Occasionally (26-50%)  Description: aching and spasm  Aggravating factors: Movement related turning LE a certain way  Alleviating factors: Rest, modified activities, Tylenol. NEURO SCREEN: denies numbness, tingling, and radiating pain     SOCIAL/FUNCTIONAL HISTORY:  How would you rate your overall health? very good  Pt lives with independent spouse in a(n) 1 story house with entry steps. 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 All hardware appears to be stable compared to immediate postoperative films. The acetabular component appears to be in good position, no evidence of loosening. Anteversion and inclination angle appear to be within acceptable criteria. The stem appears to be appropriately sized without any evidence of subsidence. No evidence of proximal femoral periprosthetic fracture. Hip is reduced. Impression: Normal Xray after total hip replacement     PATIENT STATED GOALS: Get back to playing senior softball and the senior games and to be able to walk proper again    SUBJECTIVE     Pt reports:  Still sore (indicates mid - upper thigh and lateral hip). It feels like I have a strained mm.   I I could get past that I feel like I could run for miles. Stiff. OBJECTIVE     FINDINGS:  OBSERVATION:   POSTURE: The pelvis is level, LE alignment is symmetrical in the frontal and sagittal planes. SKIN INTEGRITY: Incision well healed           ATROPHY: R glute and thigh  PALPATION: Tender over the anterior thigh (RF) & posterolateral R hip. Gait: Independent w/o assistive device. Antalgic. Tends to walk w flexed knees. HOS FUNCTIONAL QUESTIONNAIRE 12/16/22 1/13/23   RAW SCORE 27/68 45/68   % FUNCTION 40 66   % DYSFUNCTION/LIMITATION 60 34      PAIN RATING (0 - 10) 12/29/22 1/4/23 1/13/23    PRE 0 1  ? 1    POST          LEFT (NON - INVOLVED) RIGHT LQ AROM (?) 1/13/23 1/30/23   13 HIP ER (45°) 12 (+4) 17   13 HIP IR (45°) 19 (+1) 15   101 HIP FLEXION (120°) 93 (+8) 100   18 HIP ABDUCTION (45°) 15 (NC) 16   NT HIP EXTENSION (10 - 30°) NT NT   121 KNEE FLEXION (140°) 115(-7) 137      LEFT (NON - INVOLVED) LQ MMT (0 - 5) RIGHT (1/30/23)   5  HIP FLEXION 5    5 HIP ABDUCTION 5    5 HIP EXTENSION  NT    5 KNEE EXTENSION  5    5 KNEE FLEXION  5      Tool Used: STEADI  Test  1/16/23 1/30/23   TUG 8 seconds  10 sec   30s Chair Stand 8 repetitions    9 reps   4 Stage Balance Side by side: 10 seconds  Semi-tandem Right fwd: 10 seconds  Tandem Right fwd: 10 seconds  Single leg Right: 3 seconds Side by side: 10 seconds  Semi-tandem Right fwd: 10 seconds  Tandem Right fwd: 10 seconds  Single leg Right: 7seconds   Interpretation of Score: The complete STEADI assessment is a measure of gait, strength, and balance used in conjunction with a fall risk screening to determine a person's risk and need for interventions. TUG: A score of greater than 14 seconds indicates an increased risk for falls.   35s Chair Stand: Scores related to fall risk are adjusted for age and gender; a person is at increased risk of falling if the complete:  Age Men Women   60-64 <14 <12   65-69 <12 <11   70-74 <12 <10   75-79 <11 <10   80-84 <10 <9   85-89 <8 <8 90-94 <7 <4     4 Stage Balance: If a person can hold a position for 10 seconds without moving their feet or needing support, go on to the  next position; if not, STOP the test. A person who cannot hold the tandem stand for at least 10 seconds is at increased risk of falling. TREATMENT PROVIDED TODAY:    PATIENT EDUCATION: PT supervised all exercises utilizing verbal and tactile cues as needed to correct technique. MANUAL THERAPY: 10 MIN. PT utilized the following techniques to relieve pain, restore muscle length, restore joint accessory motion to improve PROM, AROM for the restoration of function. IASTM to the proximal R thigh, RF. PT directed stretching of the R quadriceps to increase R hip extension. 04341 - THERAPEUTIC EXERCISE: 40 MIN. To address pain and deficits related to ROM and mm function - force production, endurance, neuromuscular coordination for completing ADLs. Hip AROM reassessed. EXERCISES:    *SBA due to occasional stumbles    Side lying:  R hip abduction:2/10 - R clamshell: 2/10 -     Supine:   PT directed stretching of hip:  - ER  - abduction  - flexion (mindful of healing restrictions)    Anterior hip/rectus femoris stretch - LE dropped off edge of plinth: 2/1 min. Bridging: 3/15 - R/L Bent knee fall out: 3/25 - Green R: Loop just above the knees    Access Code: [email or web address]  URL: [email or web address]  Date: 12/16/2022  Prepared by: Yamileth Post     Exercises  Supine Heel Slide - 2 x daily - 6 x weekly - 1 sets - 1 - 2 minutes duration:  Supine Bridge - 2 x daily - 6 x weekly - 1 sets - 5 reps  Supine Single Bent Knee Fallout - 2 x daily - 6 x weekly - 1 sets - 10 reps  Supine Hip Abduction on Slider - 2 x daily - 6 x weekly - 1 sets - 10 reps  Standing Hip Abduction AROM - 2 x daily - 6 x weekly - 1 sets - 10 reps  Squat with Counter Support - 2 x daily - 6 x weekly - 1 sets - 10 reps    29994 - ICE PACK: 10 MIN.   To the R proximal thigh for the relief of pain, tenderness, swelling. ASSESSMENT     Today's session focused on relieving thigh pain w IASTM, stretching, strength - endurance training of the R hip extensors, hip abductors and the quadriceps f/b CP to the anterior thigh. Patient reports/demonstrates the following improvements:  Post session pain relieved w CP. The following impairments are present:  R hip discomfort  Limited R hip ROM - P & A  Hip strength  Balance - static & dynamic => fall risk    Flexed knee gait    These impairments result in the following activity limitations:  Wbing ADLs involving - standing, walking > 15 min, squatting, kneeling, lifting, carrying, negotiating stairs or curbs, light or heavy work and recreational activities. Fall risk. PT GOAL(S) ATTAINED:  Patient will voice understanding of symptom limited activity principle. Patient will report maintaining pain ? 4/10 w symptom limited activity, use of modalities of cold and/or heat, positioning, meds and exercise. Patient will report compliance w home program of exercise, cold and elevation. Patient will demonstrate independent skill w initial HEP. Improve HOS score to ? 50% limitation. PT GOAL(S) NOT ATTAINED      PLAN     Assess response of complaint to today's session. Continue as above if positive. Include balance training. GOALS      SHORT - TERM GOALS TO BE ATTAINED BY 2/13/23:  Improve HOS score to ? 20% limitation. LONG - TERM GOALS TO BE ATTAINED BY 3/10/23:  Improve HOS score to ? 20% limitation. Patient will return to normal sleep pattern re quality and duration. Patient will demonstrate independent skill w maintenance exercise program.  Patient will attain asymptomatic hip function that allows patient to perform ADLs involving FBing, lifting, carrying, reaching and twisting. Patient will tolerate sustained position of sitting for ? 60 min for self - care/dressing/grooming, driving, travel, work, family or social activities.   Patient will tolerate sustained position of standing for ? 45 min for self - care/dressing/grooming, preparing meals/cleaning home, work, family or social activities. Patient will tolerate walking for as necessary for travel, work, family or social activities. Patient will be able to lift from the floor 20 lbs for travel, work, family or social activities. Patient will attain hip function - ROM, mm strength - endurance, coordination of movement - that allows patient to resume desired activities/attain patient goals of: resuming training for the [employer].      Dennis Clemons

## 2023-02-06 ENCOUNTER — OFFICE VISIT (OUTPATIENT)
Age: 88
End: 2023-02-06
Payer: MEDICARE

## 2023-02-06 DIAGNOSIS — R29.898 IMPAIRED STRENGTH OF HIP MUSCLES: ICD-10-CM

## 2023-02-06 DIAGNOSIS — Z78.9 DECREASED ACTIVITIES OF DAILY LIVING (ADL): ICD-10-CM

## 2023-02-06 DIAGNOSIS — R68.89 DECREASED ACTIVITY TOLERANCE: ICD-10-CM

## 2023-02-06 DIAGNOSIS — M25.651 IMPAIRED RANGE OF MOTION OF RIGHT HIP: ICD-10-CM

## 2023-02-06 DIAGNOSIS — M25.559 HIP PAIN: Primary | ICD-10-CM

## 2023-02-06 DIAGNOSIS — Z74.09 IMPAIRED FUNCTIONAL MOBILITY, BALANCE, GAIT, AND ENDURANCE: ICD-10-CM

## 2023-02-06 PROCEDURE — 97110 THERAPEUTIC EXERCISES: CPT | Performed by: PHYSICAL THERAPIST

## 2023-02-06 NOTE — PROGRESS NOTES
[facility]  [address] [facility] [address]  Dept: [phone]    PHYSICAL THERAPY DAILY NOTE     DATE of EVAL: 12/16/22    POC ENDS: 3/10/23    PT SESSION: 15 of 20    DATE of LAST PROGRESS NOTE: 1/16/23    DATE of SURGERY: Fri. 11/18/22  WEEKS POST OP: 11    TOTAL TIMED PROCEDURE CODES: 48 MIN. TOTAL TIME: 50 MIN. REFERRING PROVIDER: Bhupinder Dia MD  DIAGNOSIS:     ICD-10-CM    1. Hip pain  M25.559       2. Impaired range of motion of right hip  M25.651       3. Impaired strength of hip muscles  R29.898       4. Impaired functional mobility, balance, gait, and endurance  Z74.09       5. Decreased activities of daily living (ADL)  Z78.9       6. Decreased activity tolerance  R68.89         THERAPY PRECAUTIONS:None  CO - MORBIDITIES AFFECTING PLAN OF CARE: None    PERTINENT MEDICAL HISTORY     Past Medical History:   Diagnosis Date    Arthritis     mild    Arthropathy, unspecified, site unspecified 7/8/2015    Back pain     Chronic renal insufficiency     pt denies 10/25/22    Diverticulosis of large intestine without diverticulitis 2016    Essential hypertension, benign 7/8/2015    managed with medication    Full dentures     GERD (gastroesophageal reflux disease)     hx-no medication at this time (noted 10/25/22)    Hand pain, left     Hip pain, right     Hx of colonic polyps 2016    adenoma    Osteoarthritis of finger of left hand     Other abnormal glucose 7/8/2015    Other and unspecified hyperlipidemia 7/8/2015    Shoulder pain         HISTORY RELATED TO PRESENT COMPLAINT      CHIEF COMPLAINTS: Stiff R hip. Cant bend over to tie shoes, etc.     HISTORY OF INJURY:   2 - 3 hx of R hip pain w hx of falls prior to MARCELL. Date symptoms began: Fri. 11/18/22  Wayne Owen of condition: Recent onset (initial onset within last 3 months)  Primary cause of current episode: Post-surgical  How did symptoms start: MARCELL  Describe current symptoms: Stiff R hip; aching, spasms. Received previous outpatient physical therapy for this problem? Yes; Number of therapy visits in the last 90 days: 8     PAIN ASSESSMENT:  Pain location: R anterior thigh pain    Current (0-10 pain scale): 0/10  Average Pain/symptom intensity (0-10 scale)  Last 24 hours: 2/10  Last week (1-7 days): 2/10  How often do you feel symptoms? Occasionally (26-50%)  Description: aching and spasm  Aggravating factors: Movement related turning LE a certain way  Alleviating factors: Rest, modified activities, Tylenol. NEURO SCREEN: denies numbness, tingling, and radiating pain     SOCIAL/FUNCTIONAL HISTORY:  How would you rate your overall health? very good  Pt lives with independent spouse in a(n) 1 story house with entry steps. 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 All hardware appears to be stable compared to immediate postoperative films. The acetabular component appears to be in good position, no evidence of loosening. Anteversion and inclination angle appear to be within acceptable criteria. The stem appears to be appropriately sized without any evidence of subsidence. No evidence of proximal femoral periprosthetic fracture. Hip is reduced. Impression: Normal Xray after total hip replacement     PATIENT STATED GOALS: Get back to playing senior softball and the senior games and to be able to walk proper again    SUBJECTIVE     Pt reports:  Felt good after last session. Some stiffness the next morning but overall improved since last session.       OBJECTIVE     FINDINGS:  OBSERVATION:   POSTURE: The pelvis is level, LE alignment is symmetrical in the frontal and sagittal planes. SKIN INTEGRITY: Incision well healed           ATROPHY: R glute and thigh  PALPATION: Tender over the anterior thigh (RF) & posterolateral R hip. Gait: Independent w/o assistive device. Antalgic. Tends to walk w flexed knees. HOS FUNCTIONAL QUESTIONNAIRE 22   RAW SCORE 27/68 45/68   % FUNCTION 40 66   % DYSFUNCTION/LIMITATION 60 34      PAIN RATING (0 - 10) 22   PRE 0 1  ? 1 1   POST          LEFT (NON - INVOLVED) RIGHT LQ AROM (?) 23   13 HIP ER (45°) 12 (+4) 17   13 HIP IR (45°) 19 (+1) 15   101 HIP FLEXION (120°) 93 (+8) 100   18 HIP ABDUCTION (45°) 15 (NC) 16   NT HIP EXTENSION (10 - 30°) NT NT   121 KNEE FLEXION (140°) 115(-7) 137      LEFT (NON - INVOLVED) LQ MMT (0 - 5) RIGHT (23)   5  HIP FLEXION 5    5 HIP ABDUCTION 5    5 HIP EXTENSION  NT    5 KNEE EXTENSION  5    5 KNEE FLEXION  5        TREATMENT PROVIDED TODAY:    PATIENT EDUCATION: PT supervised all exercises utilizing verbal and tactile cues as needed to correct technique. MANUAL THERAPY: 8 MIN. Billed under [id number]  PT utilized the following techniques to relieve pain, restore muscle length, restore joint accessory motion to improve PROM, AROM for the restoration of function. IASTM to the proximal R thigh, RF.     91102 - THERAPEUTIC EXERCISE: 42 MIN. To address pain and deficits related to ROM and mm function - force production, endurance, neuromuscular coordination for completing ADLs. Hip AROM reassessed. EXERCISES:  Stationary bike (seat position/intensity/duration): /3/    R/L hip hikin/15  R/L hip flexor stretch in half  - kneeling: 10 sec hold/5  Bilateral leg press (slide position 8): 60#/10. L/R unilateral leg press (slide position 8): 60#/10.   Bridgin/15 - Green R: Loop just above the knees  R/L Bent knee fall out:  - Green R: Loop just above the knees    *SBA due to occasional stumbles    Side lying:  R hip abduction:2/10 - R clamshell: 2/10 -   Supine:   PT directed stretching of hip:  - ER  - abduction  - flexion (mindful of healing restrictions)      Access Code: [email or web address]  URL: [email or web address]  Date: 12/16/2022  Prepared by: Noe Gravel     Exercises  Supine Heel Slide - 2 x daily - 6 x weekly - 1 sets - 1 - 2 minutes duration:  Supine Bridge - 2 x daily - 6 x weekly - 1 sets - 5 reps  Supine Single Bent Knee Fallout - 2 x daily - 6 x weekly - 1 sets - 10 reps  Supine Hip Abduction on Slider - 2 x daily - 6 x weekly - 1 sets - 10 reps  Standing Hip Abduction AROM - 2 x daily - 6 x weekly - 1 sets - 10 reps  Squat with Counter Support - 2 x daily - 6 x weekly - 1 sets - 10 reps    ASSESSMENT     Today's session focused on relieving thigh pain and increasing hip mobility into extension w IASTM, stretching, strength - endurance training of the R hip extensors, hip abductors and the quadriceps. Patient reports/demonstrates the following improvements:  Diminished pain following last session. Post session, gait improved w less antalgia noted. The following impairments are present:  R hip discomfort  Limited R hip ROM - P & A  Hip strength  Balance - static & dynamic => fall risk    Flexed knee gait    These impairments result in the following activity limitations:  Wbing ADLs involving - standing, walking > 15 min, squatting, kneeling, lifting, carrying, negotiating stairs or curbs, light or heavy work and recreational activities. Fall risk. PT GOAL(S) ATTAINED:  Patient will voice understanding of symptom limited activity principle. Patient will report maintaining pain ? 4/10 w symptom limited activity, use of modalities of cold and/or heat, positioning, meds and exercise. Patient will report compliance w home program of exercise, cold and elevation. Patient will demonstrate independent skill w initial HEP.   Improve HOS score to ? 50% limitation. PT GOAL(S) NOT ATTAINED    PLAN     Assess response of complaint to today's session. Continue as above if positive. Include balance training. GOALS      SHORT - TERM GOALS TO BE ATTAINED BY 2/13/23:  Improve HOS score to ? 20% limitation. LONG - TERM GOALS TO BE ATTAINED BY 3/10/23:  Improve HOS score to ? 20% limitation. Patient will return to normal sleep pattern re quality and duration. Patient will demonstrate independent skill w maintenance exercise program.  Patient will attain asymptomatic hip function that allows patient to perform ADLs involving FBing, lifting, carrying, reaching and twisting. Patient will tolerate sustained position of sitting for ? 60 min for self - care/dressing/grooming, driving, travel, work, family or social activities. Patient will tolerate sustained position of standing for ? 45 min for self - care/dressing/grooming, preparing meals/cleaning home, work, family or social activities. Patient will tolerate walking for as necessary for travel, work, family or social activities. Patient will be able to lift from the floor 20 lbs for travel, work, family or social activities. Patient will attain hip function - ROM, mm strength - endurance, coordination of movement - that allows patient to resume desired activities/attain patient goals of: resuming training for the [employer].      [address] [facility]

## 2023-02-08 ENCOUNTER — OFFICE VISIT (OUTPATIENT)
Age: 88
End: 2023-02-08
Payer: MEDICARE

## 2023-02-08 DIAGNOSIS — R29.898 IMPAIRED STRENGTH OF HIP MUSCLES: ICD-10-CM

## 2023-02-08 DIAGNOSIS — M25.651 IMPAIRED RANGE OF MOTION OF RIGHT HIP: ICD-10-CM

## 2023-02-08 DIAGNOSIS — M25.559 HIP PAIN: Primary | ICD-10-CM

## 2023-02-08 DIAGNOSIS — Z78.9 DECREASED ACTIVITIES OF DAILY LIVING (ADL): ICD-10-CM

## 2023-02-08 DIAGNOSIS — R68.89 DECREASED ACTIVITY TOLERANCE: ICD-10-CM

## 2023-02-08 DIAGNOSIS — Z74.09 IMPAIRED FUNCTIONAL MOBILITY, BALANCE, GAIT, AND ENDURANCE: ICD-10-CM

## 2023-02-08 PROCEDURE — 97110 THERAPEUTIC EXERCISES: CPT | Performed by: PHYSICAL THERAPIST

## 2023-02-08 NOTE — PROGRESS NOTES
[address] [facility]  [address] [facility],[address] [address]  Dept: [phone]    PHYSICAL THERAPY DAILY NOTE     DATE of EVAL: 12/16/22    POC ENDS: 3/10/23    PT SESSION: 16 of 20    DATE of LAST PROGRESS NOTE: 1/16/23    DATE of SURGERY: Fri. 11/18/22  WEEKS POST OP: 11+    TOTAL TIMED PROCEDURE CODES: 39 MIN. TOTAL TIME: 45 MIN. REFERRING PROVIDER: Ivana Mcclendon MD  DIAGNOSIS:     ICD-10-CM    1. Hip pain  M25.559       2. Impaired range of motion of right hip  M25.651       3. Impaired strength of hip muscles  R29.898       4. Impaired functional mobility, balance, gait, and endurance  Z74.09       5. Decreased activities of daily living (ADL)  Z78.9       6. Decreased activity tolerance  R68.89           THERAPY PRECAUTIONS:None  CO - MORBIDITIES AFFECTING PLAN OF CARE: None    PERTINENT MEDICAL HISTORY     Past Medical History:   Diagnosis Date    Arthritis     mild    Arthropathy, unspecified, site unspecified 7/8/2015    Back pain     Chronic renal insufficiency     pt denies 10/25/22    Diverticulosis of large intestine without diverticulitis 2016    Essential hypertension, benign 7/8/2015    managed with medication    Full dentures     GERD (gastroesophageal reflux disease)     hx-no medication at this time (noted 10/25/22)    Hand pain, left     Hip pain, right     Hx of colonic polyps 2016    adenoma    Osteoarthritis of finger of left hand     Other abnormal glucose 7/8/2015    Other and unspecified hyperlipidemia 7/8/2015    Shoulder pain         HISTORY RELATED TO PRESENT COMPLAINT      CHIEF COMPLAINTS: Stiff R hip. Cant bend over to tie shoes, etc.     HISTORY OF INJURY:   2 - 3 hx of R hip pain w hx of falls prior to MARCELL. Date symptoms began: Fri. 11/18/22  Jillian Thrasher of condition: Recent onset (initial onset within last 3 months)  Primary cause of current episode: Post-surgical  How did symptoms start: [facility]RCE[facility]  Describe current symptoms: Stiff R hip; aching, spasms. Received previous outpatient physical therapy for this problem? Yes; Number of therapy visits in the last 90 days: 8     PAIN ASSESSMENT:  Pain location: R anterior thigh pain    Current (0-10 pain scale): 0/10  Average Pain/symptom intensity (0-10 scale)  Last 24 hours: 2/10  Last week (1-7 days): 2/10  How often do you feel symptoms? Occasionally (26-50%)  Description: aching and spasm  Aggravating factors: Movement related turning LE a certain way  Alleviating factors: Rest, modified activities, Tylenol. NEURO SCREEN: denies numbness, tingling, and radiating pain     SOCIAL/FUNCTIONAL HISTORY:  How would you rate your overall health? very good  Pt lives with independent spouse in a(n) 1 story house with entry steps. 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 All hardware appears to be stable compared to immediate postoperative films. The acetabular component appears to be in good position, no evidence of loosening. Anteversion and inclination angle appear to be within acceptable criteria. The stem appears to be appropriately sized without any evidence of subsidence. No evidence of proximal femoral periprosthetic fracture. Hip is reduced. Impression: Normal Xray after total hip replacement     PATIENT STATED GOALS: Get back to playing senior softball and the senior games and to be able to walk proper again    SUBJECTIVE     Pt reports:  Did well the previous two days but this morning proximal and lateral hip pain/cramp woke me up so I am gimpy.       OBJECTIVE     FINDINGS:  OBSERVATION:   POSTURE: The pelvis is level, LE alignment is symmetrical in the frontal and sagittal planes. SKIN INTEGRITY: Incision well healed           ATROPHY: R glute and thigh  PALPATION: Tender over the anterior thigh (RF) & posterolateral R hip. Gait: Independent w/o assistive device. Antalgic. Tends to walk w flexed knees. HOS FUNCTIONAL QUESTIONNAIRE 22   RAW SCORE 27/ 45/68   % FUNCTION 40 66   % DYSFUNCTION/LIMITATION 60 34      PAIN RATING (0 - 10) 22   PRE 0 1  ? 1 1 2   POST           LEFT (NON - INVOLVED) RIGHT LQ AROM (?) 23   13 HIP ER (45°) 12 (+4) 17   13 HIP IR (45°) 19 (+1) 15   101 HIP FLEXION (120°) 93 (+8) 100   18 HIP ABDUCTION (45°) 15 (NC) 16   NT HIP EXTENSION (10 - 30°) NT NT   121 KNEE FLEXION (140°) 115(-7) 137      LEFT (NON - INVOLVED) LQ MMT (0 - 5) RIGHT (23)   5  HIP FLEXION 5    5 HIP ABDUCTION 5    5 HIP EXTENSION  NT    5 KNEE EXTENSION  5    5 KNEE FLEXION  5        TREATMENT PROVIDED TODAY:    PATIENT EDUCATION: PT supervised all exercises utilizing verbal and tactile cues as needed to correct technique. [id number] - THERAPEUTIC EXERCISE: 45 MIN. To address pain and deficits related to ROM and mm function - force production, endurance, neuromuscular coordination for completing ADLs. Hip AROM reassessed. EXERCISES:  Stationary bike (seat position/intensity/duration): 6/3/6    R/L hip hikin/15  R/L hip flexor stretch in half  - kneeling: 10 sec hold/5  Bilateral leg press (slide position 10): 70#/10. L/R unilateral leg press (slide position 10): 70#/10.   Bridgin/15 - Green R: Loop just above the knees  R/L Bent knee fall out:  - Green R: Loop just above the knees    *SBA due to occasional stumbles    Side lying:  R hip abduction: - Lt Bl R: Loop just above the knees   R clamshell:  - Lt Bl R: Loop just above the knees     Supine:   PT directed stretching of hip:  - ER  - abduction  - flexion (mindful of healing restrictions)  - stretching to increase knee extension      Access Code: [email or web address]  URL: [email or web address]  Date: 12/16/2022  Prepared by: Kamryn Yadav     Exercises  Supine Heel Slide - 2 x daily - 6 x weekly - 1 sets - 1 - 2 minutes duration:  Supine Bridge - 2 x daily - 6 x weekly - 1 sets - 5 reps  Supine Single Bent Knee Fallout - 2 x daily - 6 x weekly - 1 sets - 10 reps  Supine Hip Abduction on Slider - 2 x daily - 6 x weekly - 1 sets - 10 reps  Standing Hip Abduction AROM - 2 x daily - 6 x weekly - 1 sets - 10 reps  Squat with Counter Support - 2 x daily - 6 x weekly - 1 sets - 10 reps    ASSESSMENT     Today's session focused on relieving thigh pain and increasing hip mobility into extension w IASTM, stretching, strength - endurance training of the R hip extensors, hip abductors and the quadriceps. Patient reports/demonstrates the following improvements:  Pain diminished post session. Passive hip flexion to ~100°. The following impairments are present:  R hip discomfort  Limited R hip ROM - P & A  R knee extension deficit  R hip strength  Balance - static & dynamic => fall risk    Flexed knee gait    These impairments result in the following activity limitations:  Wbing ADLs involving - standing, walking > 15 min, squatting, kneeling, lifting, carrying, negotiating stairs or curbs, light or heavy work and recreational activities. Fall risk. PT GOAL(S) ATTAINED:  Patient will voice understanding of symptom limited activity principle. Patient will report maintaining pain ? 4/10 w symptom limited activity, use of modalities of cold and/or heat, positioning, meds and exercise. Patient will report compliance w home program of exercise, cold and elevation. Patient will demonstrate independent skill w initial HEP. Improve HOS score to ? 50% limitation. PT GOAL(S) NOT ATTAINED    PLAN     Assess response of complaint to today's session.   Continue as above if positive. Include balance training. GOALS      SHORT - TERM GOALS TO BE ATTAINED BY 2/13/23:  Improve HOS score to ? 20% limitation. LONG - TERM GOALS TO BE ATTAINED BY 3/10/23:  Improve HOS score to ? 20% limitation. Patient will return to normal sleep pattern re quality and duration. Patient will demonstrate independent skill w maintenance exercise program.  Patient will attain asymptomatic hip function that allows patient to perform ADLs involving FBing, lifting, carrying, reaching and twisting. Patient will tolerate sustained position of sitting for ? 60 min for self - care/dressing/grooming, driving, travel, work, family or social activities. Patient will tolerate sustained position of standing for ? 45 min for self - care/dressing/grooming, preparing meals/cleaning home, work, family or social activities. Patient will tolerate walking for as necessary for travel, work, family or social activities. Patient will be able to lift from the floor 20 lbs for travel, work, family or social activities. Patient will attain hip function - ROM, mm strength - endurance, coordination of movement - that allows patient to resume desired activities/attain patient goals of: resuming training for the [employer].      Mauricio Jules

## 2023-02-13 ENCOUNTER — OFFICE VISIT (OUTPATIENT)
Age: 88
End: 2023-02-13
Payer: MEDICARE

## 2023-02-13 DIAGNOSIS — Z74.09 IMPAIRED FUNCTIONAL MOBILITY, BALANCE, GAIT, AND ENDURANCE: ICD-10-CM

## 2023-02-13 DIAGNOSIS — Z78.9 DECREASED ACTIVITIES OF DAILY LIVING (ADL): ICD-10-CM

## 2023-02-13 DIAGNOSIS — R29.898 IMPAIRED STRENGTH OF HIP MUSCLES: ICD-10-CM

## 2023-02-13 DIAGNOSIS — R68.89 DECREASED ACTIVITY TOLERANCE: ICD-10-CM

## 2023-02-13 DIAGNOSIS — M25.559 HIP PAIN: Primary | ICD-10-CM

## 2023-02-13 DIAGNOSIS — M25.651 IMPAIRED RANGE OF MOTION OF RIGHT HIP: ICD-10-CM

## 2023-02-13 PROCEDURE — 97110 THERAPEUTIC EXERCISES: CPT | Performed by: PHYSICAL THERAPIST

## 2023-02-13 NOTE — PROGRESS NOTES
[facility]  [address] [facility] [place], [place],[address] [address]  Dept: [phone]    PHYSICAL THERAPY DAILY NOTE     DATE of EVAL: 12/16/22    POC ENDS: 3/10/23    PT SESSION: 18 of 20    DATE of LAST PROGRESS NOTE: 1/16/23    DATE of SURGERY: Fri. 11/18/22  WEEKS POST OP: 12+    TOTAL TIMED PROCEDURE CODES: 39 MIN. TOTAL TIME: 50 MIN. REFERRING PROVIDER: Jatinder Solo MD  DIAGNOSIS:     ICD-10-CM    1. Hip pain  M25.559       2. Impaired range of motion of right hip  M25.651       3. Impaired strength of hip muscles  R29.898       4. Impaired functional mobility, balance, gait, and endurance  Z74.09       5. Decreased activities of daily living (ADL)  Z78.9       6. Decreased activity tolerance  R68.89           THERAPY PRECAUTIONS:None  CO - MORBIDITIES AFFECTING PLAN OF CARE: None    PERTINENT MEDICAL HISTORY     Past Medical History:   Diagnosis Date    Arthritis     mild    Arthropathy, unspecified, site unspecified 7/8/2015    Back pain     Chronic renal insufficiency     pt denies 10/25/22    Diverticulosis of large intestine without diverticulitis 2016    Essential hypertension, benign 7/8/2015    managed with medication    Full dentures     GERD (gastroesophageal reflux disease)     hx-no medication at this time (noted 10/25/22)    Hand pain, left     Hip pain, right     Hx of colonic polyps 2016    adenoma    Osteoarthritis of finger of left hand     Other abnormal glucose 7/8/2015    Other and unspecified hyperlipidemia 7/8/2015    Shoulder pain         HISTORY RELATED TO PRESENT COMPLAINT      CHIEF COMPLAINTS: Stiff R hip. Cant bend over to tie shoes, etc.     HISTORY OF INJURY:   2 - 3 hx of R hip pain w hx of falls prior to MARCELL. Date symptoms began: Fri. 11/18/22  Nel Needle of condition: Recent onset (initial onset within last 3 months)  Primary cause of current episode: Post-surgical  How did symptoms start: MARCELL  Describe current symptoms: Stiff R hip; aching, spasms. Received previous outpatient physical therapy for this problem? Yes; Number of therapy visits in the last 90 days: 8     PAIN ASSESSMENT:  Pain location: R anterior thigh pain    Current (0-10 pain scale): 0/10  Average Pain/symptom intensity (0-10 scale)  Last 24 hours: 2/10  Last week (1-7 days): 2/10  How often do you feel symptoms? Occasionally (26-50%)  Description: aching and spasm  Aggravating factors: Movement related turning LE a certain way  Alleviating factors: Rest, modified activities, Tylenol. NEURO SCREEN: denies numbness, tingling, and radiating pain     SOCIAL/FUNCTIONAL HISTORY:  How would you rate your overall health? very good  Pt lives with independent spouse in a(n) 1 story house with entry steps. 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 All hardware appears to be stable compared to immediate postoperative films. The acetabular component appears to be in good position, no evidence of loosening. Anteversion and inclination angle appear to be within acceptable criteria. The stem appears to be appropriately sized without any evidence of subsidence. No evidence of proximal femoral periprosthetic fracture. Hip is reduced. 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 LE seems to tighten up @ night.     OBJECTIVE     FINDINGS:  OBSERVATION:   POSTURE: The pelvis is level, LE alignment is symmetrical in the frontal and sagittal planes. SKIN INTEGRITY: Incision well healed           ATROPHY: R glute and thigh  PALPATION: Tender over the anterior thigh (RF) & posterolateral R hip. Gait: Independent w/o assistive device. Antalgic. Tends to walk w flexed knees. HOS FUNCTIONAL QUESTIONNAIRE 22   RAW SCORE  45 44/60   % FUNCTION 40 66 73   % DYSFUNCTION/LIMITATION 60 34 27     PAIN RATING (0 - 10) 22   PRE 0 1  ? 1 1 2   POST           LEFT (NON - INVOLVED) RIGHT LQ AROM (?) 23   13 HIP ER (45°) 12 (+4) 17   13 HIP IR (45°) 19 (+1) 15   101 HIP FLEXION (120°) 93 (+8) 100   18 HIP ABDUCTION (45°) 15 (NC) 16   NT HIP EXTENSION (10 - 30°) NT NT   121 KNEE FLEXION (140°) 115(-7) 137      LEFT (NON - INVOLVED) LQ MMT (0 - 5) RIGHT (23)   5  HIP FLEXION 5    5 HIP ABDUCTION 5    5 HIP EXTENSION  NT    5 KNEE EXTENSION  5    5 KNEE FLEXION  5        TREATMENT PROVIDED TODAY:    PATIENT EDUCATION: PT supervised all exercises utilizing verbal and tactile cues as needed to correct technique. [id number] - THERAPEUTIC EXERCISE: 45 MIN. To address pain and deficits related to ROM and mm function - force production, endurance, neuromuscular coordination for completing ADLs. Hip function reassessed w HOS. EXERCISES:  Stationary bike (seat position/intensity/duration): 6/3/4    R/L hip hikin/20  R/L hip flexor stretch in half  - kneeling: 10 sec hold/5  Deadlifts: Add @ next session. Bilateral leg press (slide position 10): 70#/10. L/R unilateral leg press (slide position 10): 70#/2/10. L/R split squat w Stroop straps: 1/10 - PT providing SBA.     Side lying:  R hip abduction:2/15 - Lt Bl R: Loop just above the knees   R clamshell:  - Lt Bl R: Loop just above the knees     Supine:   PT directed stretching of hip:  - ER  - abduction  - flexion (mindful of healing restrictions)  - stretching to increase knee extension      Access Code: [email or web address]  URL: [email or web address]  Date: 12/16/2022  Prepared by: Pro Garcia     Exercises  Supine Heel Slide - 2 x daily - 6 x weekly - 1 sets - 1 - 2 minutes duration:  Supine Bridge - 2 x daily - 6 x weekly - 1 sets - 5 reps  Supine Single Bent Knee Fallout - 2 x daily - 6 x weekly - 1 sets - 10 reps  Supine Hip Abduction on Slider - 2 x daily - 6 x weekly - 1 sets - 10 reps  Standing Hip Abduction AROM - 2 x daily - 6 x weekly - 1 sets - 10 reps  Squat with Counter Support - 2 x daily - 6 x weekly - 1 sets - 10 reps    ASSESSMENT     Today's session focused on relieving thigh pain and increasing hip mobility into extension w stretching, strength - endurance training of the R hip extensors, hip abductors and the quadriceps. Patient reports/demonstrates the following improvements:  Per HOS function improved 7 percentage points. The following impairments are present:  R hip/groin discomfort  Limited R hip ROM - P & A  R knee extension deficit  R hip strength  Balance - static & dynamic => fall risk    Flexed knee gait    These impairments result in the following activity limitations:  Wbing ADLs involving - standing, walking > 10 min, squatting, kneeling, lifting, carrying, negotiating hills or curbs, light or heavy work and recreational activities. Fall risk. PT GOAL(S) ATTAINED:  Patient will voice understanding of symptom limited activity principle. Patient will report maintaining pain ? 4/10 w symptom limited activity, use of modalities of cold and/or heat, positioning, meds and exercise. Patient will report compliance w home program of exercise, cold and elevation. Patient will demonstrate independent skill w initial HEP. Improve HOS score to ? 50% limitation. PT GOAL(S) NOT ATTAINED  2/13/23:  Improve HOS score to ? 20% limitation. 27% limitation. PLAN     Assess response of complaint to today's session.   Continue as above if positive. Include balance training. GOALS      LONG - TERM GOALS TO BE ATTAINED BY 3/10/23:  Improve HOS score to ? 20% limitation. Patient will return to normal sleep pattern re quality and duration. Patient will demonstrate independent skill w maintenance exercise program.  Patient will attain asymptomatic hip function that allows patient to perform ADLs involving FBing, lifting, carrying, reaching and twisting. Patient will tolerate sustained position of sitting for ? 60 min for self - care/dressing/grooming, driving, travel, work, family or social activities. Patient will tolerate sustained position of standing for ? 45 min for self - care/dressing/grooming, preparing meals/cleaning home, work, family or social activities. Patient will tolerate walking for as necessary for travel, work, family or social activities. Patient will be able to lift from the floor 20 lbs for travel, work, family or social activities. Patient will attain hip function - ROM, mm strength - endurance, coordination of movement - that allows patient to resume desired activities/attain patient goals of: resuming training for the [employer].      Mere Law

## 2023-02-16 ENCOUNTER — OFFICE VISIT (OUTPATIENT)
Age: 88
End: 2023-02-16

## 2023-02-16 DIAGNOSIS — Z74.09 IMPAIRED FUNCTIONAL MOBILITY, BALANCE, GAIT, AND ENDURANCE: ICD-10-CM

## 2023-02-16 DIAGNOSIS — M25.559 HIP PAIN: Primary | ICD-10-CM

## 2023-02-16 DIAGNOSIS — R68.89 DECREASED ACTIVITY TOLERANCE: ICD-10-CM

## 2023-02-16 DIAGNOSIS — Z78.9 DECREASED ACTIVITIES OF DAILY LIVING (ADL): ICD-10-CM

## 2023-02-16 DIAGNOSIS — R29.898 IMPAIRED STRENGTH OF HIP MUSCLES: ICD-10-CM

## 2023-02-16 DIAGNOSIS — M25.651 IMPAIRED RANGE OF MOTION OF RIGHT HIP: ICD-10-CM

## 2023-02-16 NOTE — PROGRESS NOTES
[facility]  [address] [facility],[address] [address]  Dept: [phone]    PHYSICAL THERAPY DAILY NOTE     DATE of EVAL: 12/16/22    POC ENDS: 3/10/23    PT SESSION: 19 of 20    DATE of LAST PROGRESS NOTE: 1/16/23    DATE of SURGERY: Fri. 11/18/22  WEEKS POST OP: 12+    TOTAL TIMED PROCEDURE CODES: 50 MIN. TOTAL TIME: 50 MIN. REFERRING PROVIDER: Telma Carlson MD  DIAGNOSIS:     ICD-10-CM    1. Hip pain  M25.559       2. Impaired range of motion of right hip  M25.651       3. Impaired strength of hip muscles  R29.898       4. Impaired functional mobility, balance, gait, and endurance  Z74.09       5. Decreased activities of daily living (ADL)  Z78.9       6. Decreased activity tolerance  R68.89             THERAPY PRECAUTIONS:None  CO - MORBIDITIES AFFECTING PLAN OF CARE: None    PERTINENT MEDICAL HISTORY     Past Medical History:   Diagnosis Date    Arthritis     mild    Arthropathy, unspecified, site unspecified 7/8/2015    Back pain     Chronic renal insufficiency     pt denies 10/25/22    Diverticulosis of large intestine without diverticulitis 2016    Essential hypertension, benign 7/8/2015    managed with medication    Full dentures     GERD (gastroesophageal reflux disease)     hx-no medication at this time (noted 10/25/22)    Hand pain, left     Hip pain, right     Hx of colonic polyps 2016    adenoma    Osteoarthritis of finger of left hand     Other abnormal glucose 7/8/2015    Other and unspecified hyperlipidemia 7/8/2015    Shoulder pain         HISTORY RELATED TO PRESENT COMPLAINT      CHIEF COMPLAINTS: Stiff R hip. Cant bend over to tie shoes, etc.     HISTORY OF INJURY:   2 - 3 hx of R hip pain w hx of falls prior to MARCELL. Date symptoms began: Fri. 11/18/22  Sharmila Royal of condition: Recent onset (initial onset within last 3 months)  Primary cause of current episode: Post-surgical  How did symptoms start: MARCELL  Describe current symptoms: Stiff R hip; aching, spasms. Received previous outpatient physical therapy for this problem? Yes; Number of therapy visits in the last 90 days: 8     PAIN ASSESSMENT:  Pain location: R anterior thigh pain    Current (0-10 pain scale): 0/10  Average Pain/symptom intensity (0-10 scale)  Last 24 hours: 2/10  Last week (1-7 days): 2/10  How often do you feel symptoms? Occasionally (26-50%)  Description: aching and spasm  Aggravating factors: Movement related turning LE a certain way  Alleviating factors: Rest, modified activities, Tylenol. NEURO SCREEN: denies numbness, tingling, and radiating pain     SOCIAL/FUNCTIONAL HISTORY:  How would you rate your overall health? very good  Pt lives with independent spouse in a(n) 1 story house with entry steps. 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 All hardware appears to be stable compared to immediate postoperative films. The acetabular component appears to be in good position, no evidence of loosening. Anteversion and inclination angle appear to be within acceptable criteria. The stem appears to be appropriately sized without any evidence of subsidence. No evidence of proximal femoral periprosthetic fracture. Hip is reduced. 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 CC remains the R LE tightens up @ night, indicates the upper thigh wrapping around to the buttock.     OBJECTIVE FINDINGS:  OBSERVATION:   POSTURE: The pelvis is level, LE alignment is symmetrical in the frontal and sagittal planes. SKIN INTEGRITY: Incision well healed           ATROPHY: R glute and thigh  PALPATION: Tender over the anterior thigh (RF) & posterolateral R hip. Gait: Independent w/o assistive device. Antalgic. Tends to walk w flexed knees. HOS FUNCTIONAL QUESTIONNAIRE 22   RAW SCORE  45 44/60   % FUNCTION 40 66 73   % DYSFUNCTION/LIMITATION 60 34 27     PAIN RATING (0 - 10) 22   PRE 0 1  ? 1 1 2   POST           LEFT (NON - INVOLVED) RIGHT LQ AROM (?) 23   13 HIP ER (45°) 12 (+4) 17   13 HIP IR (45°) 19 (+1) 15   101 HIP FLEXION (120°) 93 (+8) 100   18 HIP ABDUCTION (45°) 15 (NC) 16   NT HIP EXTENSION (10 - 30°) NT NT   121 KNEE FLEXION (140°) 115(-7) 137      LEFT (NON - INVOLVED) LQ MMT (0 - 5) RIGHT (23)   5  HIP FLEXION 5    5 HIP ABDUCTION 5    5 HIP EXTENSION  NT    5 KNEE EXTENSION  5    5 KNEE FLEXION  5        TREATMENT PROVIDED TODAY:    PATIENT EDUCATION: PT supervised all exercises utilizing verbal and tactile cues as needed to correct technique. [id number] - THERAPEUTIC EXERCISE: 50 MIN. To address pain and deficits related to ROM and mm function - force production, endurance, neuromuscular coordination for completing ADLs. EXERCISES:  Stationary bike (seat position/intensity/duration): 6/3/4  R/L front step up: Bl - 0#/10. 5#/10. 7#/10  Bilateral leg press (slide position 10): 80#/10. L/R unilateral leg press (slide position 10): 80#/2/10. R/L hip hikin/15  Deadlifts: 2Bl+2Gr: 10#/20  R hip flexor stretch in standing  R hip flexor stretch in half  - kneeling: 10 sec hold/90 sec. Attempted stretching L LE but provoked R hip and was stopped.     Side lying:  R hip abduction:2/15 - Lt Bl R: Loop just above the knees   R clamshell:  - Lt Bl R: Loop just above the knees     Supine:   PT directed stretching of hip:  - ER  - abduction  - flexion (mindful of healing restrictions)  - stretching to increase knee extension    Anterior hip/rectus femoris stretch - LE dropped off edge of plinth: 2/1 min. Access Code: [email or web address]  URL: [email or web address]  Date: 12/16/2022  Prepared by: Oralia Grimes     Exercises  Supine Heel Slide - 2 x daily - 6 x weekly - 1 sets - 1 - 2 minutes duration:  Supine Bridge - 2 x daily - 6 x weekly - 1 sets - 5 reps  Supine Single Bent Knee Fallout - 2 x daily - 6 x weekly - 1 sets - 10 reps  Supine Hip Abduction on Slider - 2 x daily - 6 x weekly - 1 sets - 10 reps  Standing Hip Abduction AROM - 2 x daily - 6 x weekly - 1 sets - 10 reps  Squat with Counter Support - 2 x daily - 6 x weekly - 1 sets - 10 reps    ASSESSMENT     Today's session focused on relieving thigh pain and increasing hip mobility into extension w stretching, strength - endurance training of the R hip extensors, hip abductors and the quadriceps. Patient reports/demonstrates the following improvements:  Per HOS function improved 7 percentage points. The following impairments are present:  R hip/groin discomfort  Limited R hip ROM - P & A  R knee extension deficit  R hip strength  Balance - static & dynamic => fall risk    Flexed knee gait    These impairments result in the following activity limitations:  Wbing ADLs involving - standing, walking > 10 min, squatting, kneeling, lifting, carrying, negotiating hills or curbs, light or heavy work and recreational activities. Fall risk. PT GOAL(S) ATTAINED:  Patient will voice understanding of symptom limited activity principle. Patient will report maintaining pain ? 4/10 w symptom limited activity, use of modalities of cold and/or heat, positioning, meds and exercise. Patient will report compliance w home program of exercise, cold and elevation. Patient will demonstrate independent skill w initial HEP.   Improve HOS score to ? 50% limitation. PT GOAL(S) NOT ATTAINED  2/13/23:  Improve HOS score to ? 20% limitation. 27% limitation. PLAN     Reassess at next session. Include balance training. GOALS      LONG - TERM GOALS TO BE ATTAINED BY 3/10/23:  Improve HOS score to ? 20% limitation. Patient will return to normal sleep pattern re quality and duration. Patient will demonstrate independent skill w maintenance exercise program.  Patient will attain asymptomatic hip function that allows patient to perform ADLs involving FBing, lifting, carrying, reaching and twisting. Patient will tolerate sustained position of sitting for ? 60 min for self - care/dressing/grooming, driving, travel, work, family or social activities. Patient will tolerate sustained position of standing for ? 45 min for self - care/dressing/grooming, preparing meals/cleaning home, work, family or social activities. Patient will tolerate walking for as necessary for travel, work, family or social activities. Patient will be able to lift from the floor 20 lbs for travel, work, family or social activities. Patient will attain hip function - ROM, mm strength - endurance, coordination of movement - that allows patient to resume desired activities/attain patient goals of: resuming training for the [employer]

## 2023-02-20 ENCOUNTER — OFFICE VISIT (OUTPATIENT)
Age: 88
End: 2023-02-20
Payer: MEDICARE

## 2023-02-20 DIAGNOSIS — R68.89 DECREASED ACTIVITY TOLERANCE: ICD-10-CM

## 2023-02-20 DIAGNOSIS — Z74.09 IMPAIRED FUNCTIONAL MOBILITY, BALANCE, GAIT, AND ENDURANCE: ICD-10-CM

## 2023-02-20 DIAGNOSIS — M25.559 HIP PAIN: Primary | ICD-10-CM

## 2023-02-20 DIAGNOSIS — Z78.9 DECREASED ACTIVITIES OF DAILY LIVING (ADL): ICD-10-CM

## 2023-02-20 DIAGNOSIS — R29.898 IMPAIRED STRENGTH OF HIP MUSCLES: ICD-10-CM

## 2023-02-20 DIAGNOSIS — M25.651 IMPAIRED RANGE OF MOTION OF RIGHT HIP: ICD-10-CM

## 2023-02-20 PROCEDURE — 97110 THERAPEUTIC EXERCISES: CPT | Performed by: PHYSICAL THERAPIST

## 2023-02-20 NOTE — PROGRESS NOTES
[facility]  [address] [facility] [place] [address]  Dept: [phone]    PHYSICAL THERAPY DAILY NOTE     DATE of EVAL: 12/16/22    POC ENDS: 3/10/23    PT SESSION: 19 of 20    DATE of LAST PROGRESS NOTE: 1/16/23    DATE of SURGERY: Fri. 11/18/22  WEEKS POST OP: 12+    TOTAL TIMED PROCEDURE CODES: 39 MIN. TOTAL TIME: 45 MIN. REFERRING PROVIDER: Aga Meraz MD  DIAGNOSIS:     ICD-10-CM    1. Hip pain  M25.559       2. Impaired range of motion of right hip  M25.651       3. Impaired strength of hip muscles  R29.898       4. Impaired functional mobility, balance, gait, and endurance  Z74.09       5. Decreased activities of daily living (ADL)  Z78.9       6. Decreased activity tolerance  R68.89               THERAPY PRECAUTIONS:None  CO - MORBIDITIES AFFECTING PLAN OF CARE: None    PERTINENT MEDICAL HISTORY     Past Medical History:   Diagnosis Date    Arthritis     mild    Arthropathy, unspecified, site unspecified 7/8/2015    Back pain     Chronic renal insufficiency     pt denies 10/25/22    Diverticulosis of large intestine without diverticulitis 2016    Essential hypertension, benign 7/8/2015    managed with medication    Full dentures     GERD (gastroesophageal reflux disease)     hx-no medication at this time (noted 10/25/22)    Hand pain, left     Hip pain, right     Hx of colonic polyps 2016    adenoma    Osteoarthritis of finger of left hand     Other abnormal glucose 7/8/2015    Other and unspecified hyperlipidemia 7/8/2015    Shoulder pain         HISTORY RELATED TO PRESENT COMPLAINT      CHIEF COMPLAINTS: Stiff R hip. Cant bend over to tie shoes, etc.     HISTORY OF INJURY:   2 - 3 hx of R hip pain w hx of falls prior to MARCELL. Date symptoms began: Fri. 11/18/22  [facility] of condition: Recent onset (initial onset within last 3 months)  Primary cause of current episode: Post-surgical  How did symptoms start: MARCELL  Describe current symptoms: Stiff R hip; aching, spasms. Received previous outpatient physical therapy for this problem? Yes; Number of therapy visits in the last 90 days: 8     PAIN ASSESSMENT:  Pain location: R anterior thigh pain    Current (0-10 pain scale): 0/10  Average Pain/symptom intensity (0-10 scale)  Last 24 hours: 2/10  Last week (1-7 days): 2/10  How often do you feel symptoms? Occasionally (26-50%)  Description: aching and spasm  Aggravating factors: Movement related turning LE a certain way  Alleviating factors: Rest, modified activities, Tylenol. NEURO SCREEN: denies numbness, tingling, and radiating pain     SOCIAL/FUNCTIONAL HISTORY:  How would you rate your overall health? very good  Pt lives with independent spouse in a(n) 1 story house with entry steps. 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 All hardware appears to be stable compared to immediate postoperative films. The acetabular component appears to be in good position, no evidence of loosening. Anteversion and inclination angle appear to be within acceptable criteria. The stem appears to be appropriately sized without any evidence of subsidence. No evidence of proximal femoral periprosthetic fracture. Hip is reduced. 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 Felt good for about 2 days and then that same discomfort indicates the lateral hip extending toward the groin/upper thigh.     OBJECTIVE FINDINGS:  OBSERVATION:   POSTURE: The pelvis is level, LE alignment is symmetrical in the frontal and sagittal planes. SKIN INTEGRITY: Incision well healed           ATROPHY: R glute and thigh  PALPATION: Tender over the anterior thigh (RF) & posterolateral R hip. Gait: Independent w/o assistive device. Antalgic. Tends to walk w flexed knees. HOS FUNCTIONAL QUESTIONNAIRE 22   RAW SCORE  45 44/60   % FUNCTION 40 66 73   % DYSFUNCTION/LIMITATION 60 34 27     PAIN RATING (0 - 10) 22   PRE 0 1  ? 1 1 2   POST           LEFT (NON - INVOLVED) RIGHT LQ AROM (?) 23   13 HIP ER (45°) 12 (+4) 17   13 HIP IR (45°) 19 (+1) 15   101 HIP FLEXION (120°) 93 (+8) 100   18 HIP ABDUCTION (45°) 15 (NC) 16   NT HIP EXTENSION (10 - 30°) NT NT   121 KNEE FLEXION (140°) 115(-7) 137      LEFT (NON - INVOLVED) LQ MMT (0 - 5) RIGHT (23)   5  HIP FLEXION 5    5 HIP ABDUCTION 5    5 HIP EXTENSION  NT    5 KNEE EXTENSION  5    5 KNEE FLEXION  5        TREATMENT PROVIDED TODAY:    PATIENT EDUCATION: PT supervised all exercises utilizing verbal and tactile cues as needed to correct technique. 06720 - THERAPEUTIC EXERCISE: 45 MIN. To address pain and deficits related to ROM and mm function - force production, endurance, neuromuscular coordination for completing ADLs. EXERCISES:  Stationary bike (seat position/intensity/duration): 6/3/  R/L front step up: Bl - 0#/5. 5#/10. 7#/10. 10#/10. R/L hip hikin/15  Bilateral leg press (slide position 10): 80#/10. L/R unilateral leg press (slide position 10): 80#/2/15. Deadlifts: 2Bl+2Gr: 10#/10. 12#/10  R hip flexor stretch in standing    Side lying:  R hip abduction:2/10 - Lt Bl R: Loop @ ankles  R clamshell:  - Lt Bl R: Loop just above the knees   STM to R lateral hip just superior to the > trochanter      Access Code: [email or web address]  URL: [email or web address]  Date: 12/16/2022  Prepared by: Shad Narvaez     Exercises  Supine Heel Slide - 2 x daily - 6 x weekly - 1 sets - 1 - 2 minutes duration:  Supine Bridge - 2 x daily - 6 x weekly - 1 sets - 5 reps  Supine Single Bent Knee Fallout - 2 x daily - 6 x weekly - 1 sets - 10 reps  Supine Hip Abduction on Slider - 2 x daily - 6 x weekly - 1 sets - 10 reps  Standing Hip Abduction AROM - 2 x daily - 6 x weekly - 1 sets - 10 reps  Squat with Counter Support - 2 x daily - 6 x weekly - 1 sets - 10 reps    ASSESSMENT     Today's session focused on relieving thigh pain and increasing hip mobility into extension w stretching, strength - endurance training of the R hip extensors, hip abductors and the quadriceps. Patient reports/demonstrates the following improvements:  --    The following impairments are present:  R hip/groin discomfort  Limited R hip ROM - P & A  R knee extension deficit  R hip strength  Balance - static & dynamic => fall risk    Flexed knee gait    These impairments result in the following activity limitations:  Wbing ADLs involving - standing, walking > 10 min, squatting, kneeling, lifting, carrying, negotiating hills or curbs, light or heavy work and recreational activities. Fall risk. PT GOAL(S) ATTAINED:  Patient will voice understanding of symptom limited activity principle. Patient will report maintaining pain ? 4/10 w symptom limited activity, use of modalities of cold and/or heat, positioning, meds and exercise. Patient will report compliance w home program of exercise, cold and elevation. Patient will demonstrate independent skill w initial HEP. Improve HOS score to ? 50% limitation. PT GOAL(S) NOT ATTAINED  2/13/23:  Improve HOS score to ? 20% limitation. 27% limitation. PLAN     Reassess at next session. Include balance training. GOALS      LONG - TERM GOALS TO BE ATTAINED BY 3/10/23:  Improve HOS score to ? 20% limitation.   Patient will return to normal sleep pattern re quality and duration. Patient will demonstrate independent skill w maintenance exercise program.  Patient will attain asymptomatic hip function that allows patient to perform ADLs involving FBing, lifting, carrying, reaching and twisting. Patient will tolerate sustained position of sitting for ? 60 min for self - care/dressing/grooming, driving, travel, work, family or social activities. Patient will tolerate sustained position of standing for ? 45 min for self - care/dressing/grooming, preparing meals/cleaning home, work, family or social activities. Patient will tolerate walking for as necessary for travel, work, family or social activities. Patient will be able to lift from the floor 20 lbs for travel, work, family or social activities. Patient will attain hip function - ROM, mm strength - endurance, coordination of movement - that allows patient to resume desired activities/attain patient goals of: resuming training for the [employer].      Bonilla Bowens

## 2023-02-23 ENCOUNTER — OFFICE VISIT (OUTPATIENT)
Age: 88
End: 2023-02-23

## 2023-02-23 DIAGNOSIS — R68.89 DECREASED ACTIVITY TOLERANCE: ICD-10-CM

## 2023-02-23 DIAGNOSIS — Z74.09 IMPAIRED FUNCTIONAL MOBILITY, BALANCE, GAIT, AND ENDURANCE: ICD-10-CM

## 2023-02-23 DIAGNOSIS — M25.559 HIP PAIN: Primary | ICD-10-CM

## 2023-02-23 DIAGNOSIS — R29.898 IMPAIRED STRENGTH OF HIP MUSCLES: ICD-10-CM

## 2023-02-23 DIAGNOSIS — M25.651 IMPAIRED RANGE OF MOTION OF RIGHT HIP: ICD-10-CM

## 2023-02-23 DIAGNOSIS — Z78.9 DECREASED ACTIVITIES OF DAILY LIVING (ADL): ICD-10-CM

## 2023-02-23 NOTE — PROGRESS NOTES
[address] [facility]  [address] [facility],[address] [address]  Dept: [phone]    PHYSICAL THERAPY DAILY NOTE     DATE of EVAL: 12/16/22    POC ENDS: 3/10/23    PT SESSION: 20 of 20    DATE of LAST PROGRESS NOTE: 1/16/23    DATE of SURGERY: Fri. 11/18/22  WEEKS POST OP: 12+    TOTAL TIMED PROCEDURE CODES: 39 MIN. TOTAL TIME: 45 MIN. REFERRING PROVIDER: Raj Haas MD  DIAGNOSIS:     ICD-10-CM    1. Hip pain  M25.559       2. Impaired range of motion of right hip  M25.651       3. Impaired strength of hip muscles  R29.898       4. Impaired functional mobility, balance, gait, and endurance  Z74.09       5. Decreased activities of daily living (ADL)  Z78.9       6. Decreased activity tolerance  R68.89                 THERAPY PRECAUTIONS:None  CO - MORBIDITIES AFFECTING PLAN OF CARE: None    PERTINENT MEDICAL HISTORY     Past Medical History:   Diagnosis Date    Arthritis     mild    Arthropathy, unspecified, site unspecified 7/8/2015    Back pain     Chronic renal insufficiency     pt denies 10/25/22    Diverticulosis of large intestine without diverticulitis 2016    Essential hypertension, benign 7/8/2015    managed with medication    Full dentures     GERD (gastroesophageal reflux disease)     hx-no medication at this time (noted 10/25/22)    Hand pain, left     Hip pain, right     Hx of colonic polyps 2016    adenoma    Osteoarthritis of finger of left hand     Other abnormal glucose 7/8/2015    Other and unspecified hyperlipidemia 7/8/2015    Shoulder pain         HISTORY RELATED TO PRESENT COMPLAINT      CHIEF COMPLAINTS: Stiff R hip. Cant bend over to tie shoes, etc.     HISTORY OF INJURY:   2 - 3 hx of R hip pain w hx of falls prior to MARCELL.   Date symptoms began: Fri. 11/18/22  Aga Pal of condition: Recent onset (initial onset within last 3 months)  Primary cause of current episode: Post-surgical  How did symptoms start: MARCELL  Describe current symptoms: Stiff R hip; aching, spasms. Received previous outpatient physical therapy for this problem? Yes; Number of therapy visits in the last 90 days: 8     PAIN ASSESSMENT:  Pain location: R anterior thigh pain    Current (0-10 pain scale): 0/10  Average Pain/symptom intensity (0-10 scale)  Last 24 hours: 2/10  Last week (1-7 days): 2/10  How often do you feel symptoms? Occasionally (26-50%)  Description: aching and spasm  Aggravating factors: Movement related turning LE a certain way  Alleviating factors: Rest, modified activities, Tylenol. NEURO SCREEN: denies numbness, tingling, and radiating pain     SOCIAL/FUNCTIONAL HISTORY:  How would you rate your overall health? very good  Pt lives with independent spouse in a(n) 1 story house with entry steps. 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 All hardware appears to be stable compared to immediate postoperative films. The acetabular component appears to be in good position, no evidence of loosening. Anteversion and inclination angle appear to be within acceptable criteria. The stem appears to be appropriately sized without any evidence of subsidence. No evidence of proximal femoral periprosthetic fracture. Hip is reduced. 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     OBJECTIVE     FINDINGS:  OBSERVATION:   POSTURE: The pelvis is level, LE alignment is symmetrical in the frontal and sagittal planes. SKIN INTEGRITY: Incision well healed           ATROPHY: R glute and thigh  PALPATION: Tender over the anterior thigh (RF) & posterolateral R hip. Gait: Independent w/o assistive device. Antalgic. Tends to walk w flexed knees. HOS FUNCTIONAL QUESTIONNAIRE 22   RAW SCORE  45 44/60   % FUNCTION 40 66 73   % DYSFUNCTION/LIMITATION 60 34 27     PAIN RATING (0 - 10) 22   PRE 0 1  ? 1 1 2   POST           LEFT (NON - INVOLVED) RIGHT LQ AROM (?) 23   13 HIP ER (45°) 12 (+4) 17   13 HIP IR (45°) 19 (+1) 15   101 HIP FLEXION (120°) 93 (+8) 100   18 HIP ABDUCTION (45°) 15 (NC) 16   NT HIP EXTENSION (10 - 30°) NT NT   121 KNEE FLEXION (140°) 115(-7) 137      LEFT (NON - INVOLVED) LQ MMT (0 - 5) RIGHT (23)   5  HIP FLEXION 5    5 HIP ABDUCTION 5    5 HIP EXTENSION  NT    5 KNEE EXTENSION  5    5 KNEE FLEXION  5        TREATMENT PROVIDED TODAY:    PATIENT EDUCATION: PT supervised all exercises utilizing verbal and tactile cues as needed to correct technique. 89511 - THERAPEUTIC EXERCISE: 45 MIN. To address pain and deficits related to ROM and mm function - force production, endurance, neuromuscular coordination for completing ADLs. EXERCISES:  Stationary bike (seat position/intensity/duration): 6/3/4  R/L front step up: Bl+Gr: 3/10  R/L hip hikin/15  PA mob of R knee to improve extension  Bilateral leg press (slide position 10): 80#/10. L/R unilateral leg press (slide position 10): 80#//15. Deadlifts: 2Bl+2Gr: 10#/10. 12#/10  R hip flexor stretch in standing    Side lying:  R hip abduction:3 /10 - Lt Bl R: Loop @ ankles  R clamshell: 3/10 - Lt Bl R: Loop just above the knees       Access Code: [email or web address]  URL: [email or web address]  Date: 2022  Prepared by: Yahaira Ray     Exercises  Supine Heel Slide - 2 x daily - 6 x weekly - 1 sets - 1 - 2 minutes duration:  Supine Bridge - 2 x daily - 6 x weekly - 1 sets - 5 reps  Supine Single Bent Knee Fallout - 2 x daily - 6 x weekly - 1 sets - 10 reps  Supine Hip Abduction on Slider - 2 x daily - 6 x weekly - 1 sets - 10 reps  Standing Hip Abduction AROM - 2 x daily - 6 x weekly - 1 sets - 10 reps  Squat with Counter Support - 2 x daily - 6 x weekly - 1 sets - 10 reps    ASSESSMENT     Today's session focused on relieving thigh pain and increasing hip mobility into extension w stretching, strength - endurance training of the R hip extensors, hip abductors and the quadriceps. Patient reports/demonstrates the following improvements:  --    The following impairments are present:  R hip/groin discomfort  Limited R hip ROM - P & A  R knee extension deficit  R hip strength  Balance - static & dynamic => fall risk    Flexed knee gait    These impairments result in the following activity limitations:  Wbing ADLs involving - standing, walking > 10 min, squatting, kneeling, lifting, carrying, negotiating hills or curbs, light or heavy work and recreational activities. Fall risk. PT GOAL(S) ATTAINED:  Patient will voice understanding of symptom limited activity principle. Patient will report maintaining pain ? 4/10 w symptom limited activity, use of modalities of cold and/or heat, positioning, meds and exercise. Patient will report compliance w home program of exercise, cold and elevation. Patient will demonstrate independent skill w initial HEP. Improve HOS score to ? 50% limitation. PT GOAL(S) NOT ATTAINED  2/13/23:  Improve HOS score to ? 20% limitation. 27% limitation. PLAN     Reassess at next session. Include balance training. GOALS      LONG - TERM GOALS TO BE ATTAINED BY 3/10/23:  Improve HOS score to ? 20% limitation. Patient will return to normal sleep pattern re quality and duration.   Patient will demonstrate independent skill w maintenance exercise program.  Patient will attain asymptomatic hip function that allows patient to perform ADLs involving FBing, lifting, carrying, reaching and twisting. Patient will tolerate sustained position of sitting for ? 60 min for self - care/dressing/grooming, driving, travel, work, family or social activities. Patient will tolerate sustained position of standing for ? 45 min for self - care/dressing/grooming, preparing meals/cleaning home, work, family or social activities. Patient will tolerate walking for as necessary for travel, work, family or social activities. Patient will be able to lift from the floor 20 lbs for travel, work, family or social activities. Patient will attain hip function - ROM, mm strength - endurance, coordination of movement - that allows patient to resume desired activities/attain patient goals of: resuming training for the [employer].      Deon Arteaga

## 2023-02-28 ENCOUNTER — OFFICE VISIT (OUTPATIENT)
Age: 88
End: 2023-02-28

## 2023-02-28 DIAGNOSIS — M25.559 HIP PAIN: Primary | ICD-10-CM

## 2023-02-28 DIAGNOSIS — R68.89 DECREASED ACTIVITY TOLERANCE: ICD-10-CM

## 2023-02-28 DIAGNOSIS — R29.898 IMPAIRED STRENGTH OF HIP MUSCLES: ICD-10-CM

## 2023-02-28 DIAGNOSIS — Z74.09 IMPAIRED FUNCTIONAL MOBILITY, BALANCE, GAIT, AND ENDURANCE: ICD-10-CM

## 2023-02-28 DIAGNOSIS — M25.651 IMPAIRED RANGE OF MOTION OF RIGHT HIP: ICD-10-CM

## 2023-02-28 DIAGNOSIS — Z78.9 DECREASED ACTIVITIES OF DAILY LIVING (ADL): ICD-10-CM

## 2023-02-28 NOTE — PROGRESS NOTES
[address] [facility]  [address] [address]  Dept: [phone]    PHYSICAL THERAPY DAILY NOTE     DATE of EVAL: 12/16/22    POC ENDS: 3/10/23    PT SESSION: 21 of 20    DATE of LAST PROGRESS NOTE: 1/16/23    DATE of SURGERY: Fri. 11/18/22  WEEKS POST OP: 12+    TOTAL TIMED PROCEDURE CODES: 39 MIN. TOTAL TIME: 45 MIN. REFERRING PROVIDER: Gabriela Stringer MD  DIAGNOSIS:   No diagnosis found. THERAPY PRECAUTIONS:None  CO - MORBIDITIES AFFECTING PLAN OF CARE: None    PERTINENT MEDICAL HISTORY     Past Medical History:   Diagnosis Date    Arthritis     mild    Arthropathy, unspecified, site unspecified 7/8/2015    Back pain     Chronic renal insufficiency     pt denies 10/25/22    Diverticulosis of large intestine without diverticulitis 2016    Essential hypertension, benign 7/8/2015    managed with medication    Full dentures     GERD (gastroesophageal reflux disease)     hx-no medication at this time (noted 10/25/22)    Hand pain, left     Hip pain, right     Hx of colonic polyps 2016    adenoma    Osteoarthritis of finger of left hand     Other abnormal glucose 7/8/2015    Other and unspecified hyperlipidemia 7/8/2015    Shoulder pain         HISTORY RELATED TO PRESENT COMPLAINT      CHIEF COMPLAINTS: Stiff R hip. Cant bend over to tie shoes, etc.     HISTORY OF INJURY:   2 - 3 hx of R hip pain w hx of falls prior to MARCELL. Date symptoms began: Fri. 11/18/22  Lizbeth Shirts of condition: Recent onset (initial onset within last 3 months)  Primary cause of current episode: Post-surgical  How did symptoms start: MARCELL  Describe current symptoms: Stiff R hip; aching, spasms. Received previous outpatient physical therapy for this problem?  Yes; Number of therapy visits in the last 90 days: 8     PAIN ASSESSMENT:  Pain location: R anterior thigh pain    Current (0-10 pain scale): 0/10  Average Pain/symptom intensity (0-10 scale)  Last 24 hours: 2/10  Last week (1-7 days): 2/10  How often do you feel symptoms? Occasionally (26-50%)  Description: aching and spasm  Aggravating factors: Movement related turning LE a certain way  Alleviating factors: Rest, modified activities, Tylenol. NEURO SCREEN: denies numbness, tingling, and radiating pain     SOCIAL/FUNCTIONAL HISTORY:  How would you rate your overall health? very good  Pt lives with independent spouse in a(n) 1 story house with entry steps. 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 All hardware appears to be stable compared to immediate postoperative films. The acetabular component appears to be in good position, no evidence of loosening. Anteversion and inclination angle appear to be within acceptable criteria. The stem appears to be appropriately sized without any evidence of subsidence. No evidence of proximal femoral periprosthetic fracture. Hip is reduced. 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 OBJECTIVE     FINDINGS:  OBSERVATION:   POSTURE: The pelvis is level, LE alignment is symmetrical in the frontal and sagittal planes. SKIN INTEGRITY: Incision well healed           ATROPHY: R glute and thigh  PALPATION: Tender over the anterior thigh (RF) & posterolateral R hip. Gait: Independent w/o assistive device. Antalgic. Tends to walk w flexed knees.   HOS FUNCTIONAL QUESTIONNAIRE 12/16/22 1/13/23 2/13/23   RAW SCORE 27/68 45/68 44/60   % FUNCTION 40 66 73   % DYSFUNCTION/LIMITATION 60 34 27     PAIN RATING (0 - 10) 22   PRE 0 1  ? 1 1 2   POST           LEFT (NON - INVOLVED) RIGHT LQ AROM (?) 23   13 HIP ER (45°) 12 (+4) 17   13 HIP IR (45°) 19 (+1) 15   101 HIP FLEXION (120°) 93 (+8) 100   18 HIP ABDUCTION (45°) 15 (NC) 16   NT HIP EXTENSION (10 - 30°) NT NT   121 KNEE FLEXION (140°) 115(-7) 137      LEFT (NON - INVOLVED) LQ MMT (0 - 5) RIGHT (23)   5  HIP FLEXION 5    5 HIP ABDUCTION 5    5 HIP EXTENSION  NT    5 KNEE EXTENSION  5    5 KNEE FLEXION  5        TREATMENT PROVIDED TODAY:    PATIENT EDUCATION: PT supervised all exercises utilizing verbal and tactile cues as needed to correct technique. 89671 - MANUAL THERAPY: 8 MIN. PT utilized the following techniques to relieve pain, restore muscle length, restore joint accessory motion to improve PROM, AROM for the restoration of function. ASTM to the R lateral hip mm about the trochanter. PT directed stretching of the R quadriceps to increase R hip extension. [id number] - THERAPEUTIC EXERCISE: 45 MIN. To address pain and deficits related to ROM and mm function - force production, endurance, neuromuscular coordination for completing ADLs. EXERCISES:  Stationary bike (seat position/intensity/duration): 6/3/  R/L front step up: Bl+Gr: 3/10  R/L hip hikin/15  PA mob of R knee to improve extension  Bilateral leg press (slide position 10): 80#/10. L/R unilateral leg press (slide position 10): 80#/15. Deadlifts: 2Bl+2Gr: 10#/10. 12#/10  R hip flexor stretch in standing  Bridgin/25 - [place] R: Loop just above the knees    Side lying:  R hip abduction:3 /10 - Lt Bl R: Loop @ ankles  R clamshell:  - Lt Bl R: Loop just above the knees   STM to R lateral hip just superior to the > trochanter      Access Code: [email or web address]  URL: [email or web address]  Date: 2022  Prepared by: Anderson Negrete Exercises  Supine Heel Slide - 2 x daily - 6 x weekly - 1 sets - 1 - 2 minutes duration:  Supine Bridge - 2 x daily - 6 x weekly - 1 sets - 5 reps  Supine Single Bent Knee Fallout - 2 x daily - 6 x weekly - 1 sets - 10 reps  Supine Hip Abduction on Slider - 2 x daily - 6 x weekly - 1 sets - 10 reps  Standing Hip Abduction AROM - 2 x daily - 6 x weekly - 1 sets - 10 reps  Squat with Counter Support - 2 x daily - 6 x weekly - 1 sets - 10 reps    ASSESSMENT     Today's session focused on relieving thigh pain and increasing hip mobility into extension w stretching, strength - endurance training of the R hip extensors, hip abductors and the quadriceps. Patient reports/demonstrates the following improvements:  --    The following impairments are present:  R hip/groin discomfort  Limited R hip ROM - P & A  R knee extension deficit  R hip strength  Balance - static & dynamic => fall risk    Flexed knee gait    These impairments result in the following activity limitations:  [place] ADLs involving - standing, walking > 10 min, squatting, kneeling, lifting, carrying, negotiating hills or curbs, light or heavy work and recreational activities. Fall risk. PT GOAL(S) ATTAINED:  Patient will voice understanding of symptom limited activity principle. Patient will report maintaining pain ? 4/10 w symptom limited activity, use of modalities of cold and/or heat, positioning, meds and exercise. Patient will report compliance w home program of exercise, cold and elevation. Patient will demonstrate independent skill w initial HEP. Improve HOS score to ? 50% limitation. PT GOAL(S) NOT ATTAINED  2/13/23:  Improve HOS score to ? 20% limitation. 27% limitation. PLAN     Reassess at next session. Include balance training. GOALS      LONG - TERM GOALS TO BE ATTAINED BY 3/10/23:  Improve HOS score to ? 20% limitation. Patient will return to normal sleep pattern re quality and duration.   Patient will demonstrate independent skill w maintenance exercise program.  Patient will attain asymptomatic hip function that allows patient to perform ADLs involving FBing, lifting, carrying, reaching and twisting. Patient will tolerate sustained position of sitting for ? 60 min for self - care/dressing/grooming, driving, travel, work, family or social activities. Patient will tolerate sustained position of standing for ? 45 min for self - care/dressing/grooming, preparing meals/cleaning home, work, family or social activities. Patient will tolerate walking for as necessary for travel, work, family or social activities. Patient will be able to lift from the floor 20 lbs for travel, work, family or social activities. Patient will attain hip function - ROM, mm strength - endurance, coordination of movement - that allows patient to resume desired activities/attain patient goals of: resuming training for the [employer].      [address] [facility]

## 2023-02-28 NOTE — PROGRESS NOTES
[address] [facility]  [address] [facility] [address] [facility] [place] [address]  Dept: [phone]    PHYSICAL THERAPY DAILY NOTE     DATE of EVAL: 12/16/22    POC ENDS: 3/10/23    PT SESSION: 17 of 20    DATE of LAST PROGRESS NOTE: 1/16/23    DATE of SURGERY: Fri. 11/18/22  WEEKS POST OP: 12+    TOTAL TIMED PROCEDURE CODES: 39 MIN. TOTAL TIME: 45 MIN. REFERRING PROVIDER: Ivana Mcclendon MD  DIAGNOSIS:     ICD-10-CM    1. Hip pain  M25.559       2. Impaired range of motion of right hip  M25.651       3. Impaired strength of hip muscles  R29.898       4. Impaired functional mobility, balance, gait, and endurance  Z74.09       5. Decreased activities of daily living (ADL)  Z78.9       6. Decreased activity tolerance  R68.89       THERAPY PRECAUTIONS:None  CO - MORBIDITIES AFFECTING PLAN OF CARE: None    PERTINENT MEDICAL HISTORY     Past Medical History:   Diagnosis Date    Arthritis     mild    Arthropathy, unspecified, site unspecified 7/8/2015    Back pain     Chronic renal insufficiency     pt denies 10/25/22    Diverticulosis of large intestine without diverticulitis 2016    Essential hypertension, benign 7/8/2015    managed with medication    Full dentures     GERD (gastroesophageal reflux disease)     hx-no medication at this time (noted 10/25/22)    Hand pain, left     Hip pain, right     Hx of colonic polyps 2016    adenoma    Osteoarthritis of finger of left hand     Other abnormal glucose 7/8/2015    Other and unspecified hyperlipidemia 7/8/2015    Shoulder pain         HISTORY RELATED TO PRESENT COMPLAINT      CHIEF COMPLAINTS: Stiff R hip. Cant bend over to tie shoes, etc.     HISTORY OF INJURY:   2 - 3 hx of R hip pain w hx of falls prior to MARCELL. Date symptoms began: Fri. 11/18/22  Jillian Thrasher of condition: Recent onset (initial onset within last 3 months)  Primary cause of current episode: Post-surgical  How did symptoms start: MARCELL  Describe current symptoms: Stiff R hip; aching, spasms. Received previous outpatient physical therapy for this problem? Yes; Number of therapy visits in the last 90 days: 8     PAIN ASSESSMENT:  Pain location: R anterior thigh pain    Current (0-10 pain scale): 0/10  Average Pain/symptom intensity (0-10 scale)  Last 24 hours: 2/10  Last week (1-7 days): 2/10  How often do you feel symptoms? Occasionally (26-50%)  Description: aching and spasm  Aggravating factors: Movement related turning LE a certain way  Alleviating factors: Rest, modified activities, Tylenol. NEURO SCREEN: denies numbness, tingling, and radiating pain     SOCIAL/FUNCTIONAL HISTORY:  How would you rate your overall health? very good  Pt lives with independent spouse in a(n) 1 story house with entry steps. 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 All hardware appears to be stable compared to immediate postoperative films. The acetabular component appears to be in good position, no evidence of loosening. Anteversion and inclination angle appear to be within acceptable criteria. The stem appears to be appropriately sized without any evidence of subsidence. No evidence of proximal femoral periprosthetic fracture. Hip is reduced. 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 Still w that upper thigh/lateral hip pain/discomfort.     OBJECTIVE     FINDINGS:  OBSERVATION:   POSTURE: The pelvis is level, LE alignment is symmetrical in the frontal and sagittal planes. SKIN INTEGRITY: Incision well healed           ATROPHY: R glute and thigh  PALPATION: Tender over the anterior thigh (RF) & posterolateral R hip about the > trochanter. Gait: Independent w/o assistive device. Antalgic. Tends to walk w flexed knees. HOS FUNCTIONAL QUESTIONNAIRE 22   RAW SCORE  45 44/60   % FUNCTION 40 66 73   % DYSFUNCTION/LIMITATION 60 34 27     PAIN RATING (0 - 10) 22   PRE 0 1  ? 1 1 2   POST           LEFT (NON - INVOLVED) RIGHT LQ AROM (?) 23   13 HIP ER (45°) 12 (+4) 17   13 HIP IR (45°) 19 (+1) 15   101 HIP FLEXION (120°) 93 (+8) 100   18 HIP ABDUCTION (45°) 15 (NC) 16   NT HIP EXTENSION (10 - 30°) NT NT   121 KNEE FLEXION (140°) 115(-7) 137      LEFT (NON - INVOLVED) LQ MMT (0 - 5) RIGHT (23)   5  HIP FLEXION 5    5 HIP ABDUCTION 5    5 HIP EXTENSION  NT    5 KNEE EXTENSION  5    5 KNEE FLEXION  5        TREATMENT PROVIDED TODAY:    PATIENT EDUCATION: PT supervised all exercises utilizing verbal and tactile cues as needed to correct technique. 20698 - MANUAL THERAPY: 8 MIN. PT utilized the following techniques to relieve pain, restore muscle length, restore joint accessory motion to improve PROM, AROM for the restoration of function. STM to the R lateral hip mm about the trochanter. [id number] - THERAPEUTIC EXERCISE: 37 MIN. To address pain and deficits related to ROM and mm function - force production, endurance, neuromuscular coordination for completing ADLs. EXERCISES:  Stationary bike (seat position/intensity/duration): /3/4  R/L front step up: Bl+Gr: 3/10  R/L hip hikin/15  PA mob of R knee to improve extension  Bilateral leg press (slide position 10): 80#/10. L/R unilateral leg press (slide position 10): 80#/2/15.   Deadlifts: 2Bl+2Gr: 10#/10. 12#/10  R hip flexor stretch in standing  Bridgin/25 - [place] R: Loop just above the knees    Side lying:  R hip abduction:3 /10 - Lt Bl R: Loop @ ankles  R clamshell: 2/25 - Lt Bl R: Loop just above the knees   STM to R lateral hip just superior to the > trochanter      Access Code: [email or web address]  URL: [email or web address]  Date: 12/16/2022  Prepared by: Kyle Riddle     Exercises  Supine Heel Slide - 2 x daily - 6 x weekly - 1 sets - 1 - 2 minutes duration:  Supine Bridge - 2 x daily - 6 x weekly - 1 sets - 5 reps  Supine Single Bent Knee Fallout - 2 x daily - 6 x weekly - 1 sets - 10 reps  Supine Hip Abduction on Slider - 2 x daily - 6 x weekly - 1 sets - 10 reps  Standing Hip Abduction AROM - 2 x daily - 6 x weekly - 1 sets - 10 reps  Squat with Counter Support - 2 x daily - 6 x weekly - 1 sets - 10 reps    ASSESSMENT     Today's session focused on relieving thigh pain and increasing hip mobility into extension w stretching, strength - endurance training of the R hip extensors, hip abductors and the quadriceps. Patient reports/demonstrates the following improvements:  --    The following impairments are present:  R hip/groin discomfort  Limited R hip ROM - P & A  R knee extension deficit  R hip strength  Balance - static & dynamic => fall risk    Flexed knee gait    These impairments result in the following activity limitations:  [place] ADLs involving - standing, walking > 10 min, squatting, kneeling, lifting, carrying, negotiating hills or curbs, light or heavy work and recreational activities. Fall risk. PT GOAL(S) ATTAINED:  Patient will voice understanding of symptom limited activity principle. Patient will report maintaining pain ? 4/10 w symptom limited activity, use of modalities of cold and/or heat, positioning, meds and exercise. Patient will report compliance w home program of exercise, cold and elevation. Patient will demonstrate independent skill w initial HEP. Improve HOS score to ? 50% limitation.     PT GOAL(S) NOT ATTAINED  2/13/23:  Improve HOS score to ? 20% limitation. 27% limitation. PLAN     Reassess at next session. Include balance training. GOALS      LONG - TERM GOALS TO BE ATTAINED BY 3/10/23:  Improve HOS score to ? 20% limitation. Patient will return to normal sleep pattern re quality and duration. Patient will demonstrate independent skill w maintenance exercise program.  Patient will attain asymptomatic hip function that allows patient to perform ADLs involving FBing, lifting, carrying, reaching and twisting. Patient will tolerate sustained position of sitting for ? 60 min for self - care/dressing/grooming, driving, travel, work, family or social activities. Patient will tolerate sustained position of standing for ? 45 min for self - care/dressing/grooming, preparing meals/cleaning home, work, family or social activities. Patient will tolerate walking for as necessary for travel, work, family or social activities. Patient will be able to lift from the floor 20 lbs for travel, work, family or social activities. Patient will attain hip function - ROM, mm strength - endurance, coordination of movement - that allows patient to resume desired activities/attain patient goals of: resuming training for the [employer].      Lisha Luong

## 2023-03-02 ENCOUNTER — OFFICE VISIT (OUTPATIENT)
Age: 88
End: 2023-03-02

## 2023-03-02 DIAGNOSIS — R68.89 DECREASED ACTIVITY TOLERANCE: ICD-10-CM

## 2023-03-02 DIAGNOSIS — Z78.9 DECREASED ACTIVITIES OF DAILY LIVING (ADL): ICD-10-CM

## 2023-03-02 DIAGNOSIS — M25.559 HIP PAIN: Primary | ICD-10-CM

## 2023-03-02 DIAGNOSIS — R29.898 IMPAIRED STRENGTH OF HIP MUSCLES: ICD-10-CM

## 2023-03-02 DIAGNOSIS — M25.651 IMPAIRED RANGE OF MOTION OF RIGHT HIP: ICD-10-CM

## 2023-03-02 DIAGNOSIS — Z74.09 IMPAIRED FUNCTIONAL MOBILITY, BALANCE, GAIT, AND ENDURANCE: ICD-10-CM

## 2023-03-02 NOTE — PROGRESS NOTES
[address] [facility]  [address] [facility] [address]  Dept: [phone]    PHYSICAL THERAPY DAILY NOTE     DATE of EVAL: 12/16/22    POC ENDS: 3/10/23    PT SESSION: 18 of 20    DATE of LAST PROGRESS NOTE: 1/16/23    DATE of SURGERY: Fri. 11/18/22  WEEKS POST OP: 12+    TOTAL TIMED PROCEDURE CODES: 39 MIN. TOTAL TIME: 45 MIN. REFERRING PROVIDER: Chung Pinto MD  DIAGNOSIS:     ICD-10-CM    1. Hip pain  M25.559       2. Impaired range of motion of right hip  M25.651       3. Impaired strength of hip muscles  R29.898       4. Impaired functional mobility, balance, gait, and endurance  Z74.09       5. Decreased activities of daily living (ADL)  Z78.9       6. Decreased activity tolerance  R68.89         THERAPY PRECAUTIONS:None  CO - MORBIDITIES AFFECTING PLAN OF CARE: None    PERTINENT MEDICAL HISTORY     Past Medical History:   Diagnosis Date    Arthritis     mild    Arthropathy, unspecified, site unspecified 7/8/2015    Back pain     Chronic renal insufficiency     pt denies 10/25/22    Diverticulosis of large intestine without diverticulitis 2016    Essential hypertension, benign 7/8/2015    managed with medication    Full dentures     GERD (gastroesophageal reflux disease)     hx-no medication at this time (noted 10/25/22)    Hand pain, left     Hip pain, right     Hx of colonic polyps 2016    adenoma    Osteoarthritis of finger of left hand     Other abnormal glucose 7/8/2015    Other and unspecified hyperlipidemia 7/8/2015    Shoulder pain         HISTORY RELATED TO PRESENT COMPLAINT      CHIEF COMPLAINTS: Stiff R hip. Cant bend over to tie shoes, etc.     HISTORY OF INJURY:   2 - 3 hx of R hip pain w hx of falls prior to MARCELL. Date symptoms began: Fri. 11/18/22  Sarath Pop of condition: Recent onset (initial onset within last 3 months)  Primary cause of current episode: Post-surgical  How did symptoms start: MARCELL  Describe current symptoms: Stiff R hip; aching, spasms. Received previous outpatient physical therapy for this problem? Yes; Number of therapy visits in the last 90 days: 8     PAIN ASSESSMENT:  Pain location: R anterior thigh pain    Current (0-10 pain scale): 0/10  Average Pain/symptom intensity (0-10 scale)  Last 24 hours: 2/10  Last week (1-7 days): 2/10  How often do you feel symptoms? Occasionally (26-50%)  Description: aching and spasm  Aggravating factors: Movement related turning LE a certain way  Alleviating factors: Rest, modified activities, Tylenol. NEURO SCREEN: denies numbness, tingling, and radiating pain     SOCIAL/FUNCTIONAL HISTORY:  How would you rate your overall health? very good  Pt lives with independent spouse in a(n) 1 story house with entry steps. 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 All hardware appears to be stable compared to immediate postoperative films. The acetabular component appears to be in good position, no evidence of loosening. Anteversion and inclination angle appear to be within acceptable criteria. The stem appears to be appropriately sized without any evidence of subsidence. No evidence of proximal femoral periprosthetic fracture. Hip is reduced. 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     OBJECTIVE     FINDINGS:  OBSERVATION:   POSTURE: The pelvis is level, LE alignment is symmetrical in the frontal and sagittal planes. SKIN INTEGRITY: Incision well healed           ATROPHY: R glute and thigh  PALPATION: Tender over the anterior thigh (RF) & posterolateral R hip about the > trochanter. Gait: Independent w/o assistive device. Antalgic. Tends to walk w flexed knees. HOS FUNCTIONAL QUESTIONNAIRE 12/16/22 1/13/23 2/13/23   RAW SCORE 27/68 45/68 44/60   % FUNCTION 40 66 73   % DYSFUNCTION/LIMITATION 60 34 27     PAIN RATING (0 - 10) 1/13/23 2/6/23 2/8/23    PRE  ? 1 1 2    POST          LEFT (NON - INVOLVED) RIGHT LQ AROM (?) 1/13/23 1/30/23   13 HIP ER (45°) 12 (+4) 17   13 HIP IR (45°) 19 (+1) 15   101 HIP FLEXION (120°) 93 (+8) 100   18 HIP ABDUCTION (45°) 15 (NC) 16   NT HIP EXTENSION (10 - 30°) NT NT   121 KNEE FLEXION (140°) 115(-7) 137      LEFT (NON - INVOLVED) LQ MMT (0 - 5) RIGHT (1/30/23)   5  HIP FLEXION 5    5 HIP ABDUCTION 5    5 HIP EXTENSION  NT    5 KNEE EXTENSION  5    5 KNEE FLEXION  5      Tool Used: STEADI  Test  1/16/23  1/30/23  3/2/23   TUG 8 seconds  10 sec  11 sec   30s Chair Stand 8 repetitions    9 reps    9 reps   4 Stage Balance Side by side: 10 seconds  Semi-tandem Right fwd: 10 seconds  Tandem Right fwd: 10 seconds  Single leg Right: 3 seconds Side by side: 10 seconds  Semi-tandem Right fwd: 10 seconds  Tandem Right fwd: 10 seconds  Single leg Right: 7 seconds Side by side: 10 seconds  Semi-tandem Left fwd: 10 seconds  Tandem Left fwd: 10 seconds  Single leg Right: 8 seconds   Interpretation of Score: The complete STEADI assessment is a measure of gait, strength, and balance used in conjunction with a fall risk screening to determine a person's risk and need for interventions. TUG: A score of greater than 14 seconds indicates an increased risk for falls.   35s Chair Stand: Scores related to fall risk are adjusted for age and gender; a person is at increased risk of falling if the complete:  Age Men Women   60-64 <14 <12   65-69 <12 <11   70-74 <12 <10 75-79 <11 <10   80-84 <10 <9   85-89 <8 <8   90-94 <7 <4     4 Stage Balance: If a person can hold a position for 10 seconds without moving their feet or needing support, go on to the  next position; if not, STOP the test. A person who cannot hold the tandem stand for at least 10 seconds is at increased risk of falling. TREATMENT PROVIDED TODAY:    PATIENT EDUCATION: PT supervised all exercises utilizing verbal and tactile cues as needed to correct technique. 51572 - MANUAL THERAPY: 8 MIN. PT utilized the following techniques to relieve pain, restore muscle length, restore joint accessory motion to improve PROM, AROM for the restoration of function. STM to the R lateral hip mm about the trochanter. 78892 - THERAPEUTIC EXERCISE: 37 MIN. To address pain and deficits related to ROM and mm function - force production, endurance, neuromuscular coordination for completing ADLs. EXERCISES:  Stationary bike (seat position/intensity/duration): /3/  R hip flexor stretch in standing  R/L hip hikin/15  L front step up: Bl+Gr: 1/10 unable on the R.  Bridgin/25 - Lt Bl R: Loop just above the knees  R hip abduction:3 /10 - Lt Bl R: Loop @ ankles  R clamshell:  - Lt Bl R: Loop just above the knees           Access Code: [email or web address]  URL: [email or web address]  Date: 2022  Prepared by: Anderson Negrete     Exercises  Supine Heel Slide - 2 x daily - 6 x weekly - 1 sets - 1 - 2 minutes duration:  Supine Bridge - 2 x daily - 6 x weekly - 1 sets - 5 reps  Supine Single Bent Knee Fallout - 2 x daily - 6 x weekly - 1 sets - 10 reps  Supine Hip Abduction on Slider - 2 x daily - 6 x weekly - 1 sets - 10 reps  Standing Hip Abduction AROM - 2 x daily - 6 x weekly - 1 sets - 10 reps  Squat with Counter Support - 2 x daily - 6 x weekly - 1 sets - 10 reps    ASSESSMENT     Today's session focused on relieving thigh pain and increasing hip mobility into extension w IASTM stretching, strength - endurance training of the R hip extensors, hip abductors and the quadriceps. Of concern is patient's inability to perform a R front step up today. Patient reports/demonstrates the following improvements:  --    The following impairments are present:  R hip/groin discomfort  Limited R hip ROM - P & A  R knee extension deficit  R hip strength  Balance - static & dynamic => fall risk    Flexed knee gait    These impairments result in the following activity limitations:  Wbing ADLs involving - standing, walking > 10 min, squatting, kneeling, lifting, carrying, negotiating hills or curbs, light or heavy work and recreational activities. Fall risk. PT GOAL(S) ATTAINED:  Patient will voice understanding of symptom limited activity principle. Patient will report maintaining pain ? 4/10 w symptom limited activity, use of modalities of cold and/or heat, positioning, meds and exercise. Patient will report compliance w home program of exercise, cold and elevation. Patient will demonstrate independent skill w initial HEP. Improve HOS score to ? 50% limitation. PT GOAL(S) NOT ATTAINED  2/13/23:  Improve HOS score to ? 20% limitation. 27% limitation. PLAN     Reassess at next session. Include balance training. GOALS      LONG - TERM GOALS TO BE ATTAINED BY 3/10/23:  Improve HOS score to ? 20% limitation. Patient will return to normal sleep pattern re quality and duration. Patient will demonstrate independent skill w maintenance exercise program.  Patient will attain asymptomatic hip function that allows patient to perform ADLs involving FBing, lifting, carrying, reaching and twisting. Patient will tolerate sustained position of sitting for ? 60 min for self - care/dressing/grooming, driving, travel, work, family or social activities.   Patient will tolerate sustained position of standing for ? 45 min for self - care/dressing/grooming, preparing meals/cleaning home, work, family or social activities. Patient will tolerate walking for as necessary for travel, work, family or social activities. Patient will be able to lift from the floor 20 lbs for travel, work, family or social activities. Patient will attain hip function - ROM, mm strength - endurance, coordination of movement - that allows patient to resume desired activities/attain patient goals of: resuming training for the [employer].      Maddison Blanchard

## 2023-03-07 ENCOUNTER — OFFICE VISIT (OUTPATIENT)
Age: 88
End: 2023-03-07
Payer: MEDICARE

## 2023-03-07 DIAGNOSIS — R29.898 IMPAIRED STRENGTH OF HIP MUSCLES: ICD-10-CM

## 2023-03-07 DIAGNOSIS — Z78.9 DECREASED ACTIVITIES OF DAILY LIVING (ADL): ICD-10-CM

## 2023-03-07 DIAGNOSIS — R68.89 DECREASED ACTIVITY TOLERANCE: ICD-10-CM

## 2023-03-07 DIAGNOSIS — M25.559 HIP PAIN: Primary | ICD-10-CM

## 2023-03-07 DIAGNOSIS — M25.651 IMPAIRED RANGE OF MOTION OF RIGHT HIP: ICD-10-CM

## 2023-03-07 DIAGNOSIS — Z74.09 IMPAIRED FUNCTIONAL MOBILITY, BALANCE, GAIT, AND ENDURANCE: ICD-10-CM

## 2023-03-07 PROCEDURE — 97140 MANUAL THERAPY 1/> REGIONS: CPT | Performed by: PHYSICAL THERAPIST

## 2023-03-07 PROCEDURE — 97110 THERAPEUTIC EXERCISES: CPT | Performed by: PHYSICAL THERAPIST

## 2023-03-07 NOTE — PROGRESS NOTES
[address] [facility]  [address] [facility] [address]  Dept: [phone]    PHYSICAL THERAPY DAILY NOTE     DATE of EVAL: 12/16/22    POC ENDS: 3/10/23    PT SESSION: 19 of 20    DATE of LAST PROGRESS NOTE: 1/16/23    DATE of SURGERY: Fri. 11/18/22  WEEKS POST OP:     TOTAL TIMED PROCEDURE CODES: 37 MIN. TOTAL TIME: 45 MIN. REFERRING PROVIDER: Ashly Keating MD  DIAGNOSIS:     ICD-10-CM    1. Hip pain  M25.559       2. Impaired range of motion of right hip  M25.651       3. Impaired strength of hip muscles  R29.898       4. Impaired functional mobility, balance, gait, and endurance  Z74.09       5. Decreased activities of daily living (ADL)  Z78.9       6. Decreased activity tolerance  R68.89           THERAPY PRECAUTIONS:None  CO - MORBIDITIES AFFECTING PLAN OF CARE: None    PERTINENT MEDICAL HISTORY     Past Medical History:   Diagnosis Date    Arthritis     mild    Arthropathy, unspecified, site unspecified 7/8/2015    Back pain     Chronic renal insufficiency     pt denies 10/25/22    Diverticulosis of large intestine without diverticulitis 2016    Essential hypertension, benign 7/8/2015    managed with medication    Full dentures     GERD (gastroesophageal reflux disease)     hx-no medication at this time (noted 10/25/22)    Hand pain, left     Hip pain, right     Hx of colonic polyps 2016    adenoma    Osteoarthritis of finger of left hand     Other abnormal glucose 7/8/2015    Other and unspecified hyperlipidemia 7/8/2015    Shoulder pain         HISTORY RELATED TO PRESENT COMPLAINT      CHIEF COMPLAINTS: Stiff R hip. Cant bend over to tie shoes, etc.     HISTORY OF INJURY:   2 - 3 hx of R hip pain w hx of falls prior to MARCELL. Date symptoms began: Fri. 11/18/22  Rylee Nap of condition: Recent onset (initial onset within last 3 months)  Primary cause of current episode: Post-surgical  How did symptoms start: MARCELL  Describe current symptoms: Stiff R hip; aching, spasms. Received previous outpatient physical therapy for this problem? Yes; Number of therapy visits in the last 90 days: 8     PAIN ASSESSMENT:  Pain location: R anterior thigh pain    Current (0-10 pain scale): 0/10  Average Pain/symptom intensity (0-10 scale)  Last 24 hours: 2/10  Last week (1-7 days): 2/10  How often do you feel symptoms? Occasionally (26-50%)  Description: aching and spasm  Aggravating factors: Movement related turning LE a certain way  Alleviating factors: Rest, modified activities, Tylenol. NEURO SCREEN: denies numbness, tingling, and radiating pain     SOCIAL/FUNCTIONAL HISTORY:  How would you rate your overall health? very good  Pt lives with independent spouse in a(n) 1 story house with entry steps. 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 All hardware appears to be stable compared to immediate postoperative films. The acetabular component appears to be in good position, no evidence of loosening. Anteversion and inclination angle appear to be within acceptable criteria. The stem appears to be appropriately sized without any evidence of subsidence. No evidence of proximal femoral periprosthetic fracture. Hip is reduced. 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 Plan on holding off on additional PT pending my follow up w Dr. Sheila Carrillo.     OBJECTIVE FINDINGS:  OBSERVATION:   POSTURE: The pelvis is level, LE alignment is symmetrical in the frontal and sagittal planes. SKIN INTEGRITY: Incision well healed           ATROPHY: R glute and thigh  PALPATION: Tender over the anterior thigh (RF) & posterolateral R hip about the > trochanter. Gait: Independent w/o assistive device. Antalgic. Tends to walk w flexed knees. HOS FUNCTIONAL QUESTIONNAIRE 12/16/22 1/13/23 2/13/23   RAW SCORE 27/68 45/68 44/60   % FUNCTION 40 66 73   % DYSFUNCTION/LIMITATION 60 34 27     PAIN RATING (0 - 10) 1/13/23 2/6/23 2/8/23    PRE  ? 1 1 2    POST          LEFT (NON - INVOLVED) RIGHT LQ AROM (?) 1/13/23 1/30/23   13 HIP ER (45°) 12 (+4) 17   13 HIP IR (45°) 19 (+1) 15   101 HIP FLEXION (120°) 93 (+8) 100   18 HIP ABDUCTION (45°) 15 (NC) 16   NT HIP EXTENSION (10 - 30°) NT NT   121 KNEE FLEXION (140°) 115(-7) 137      LEFT (NON - INVOLVED) LQ MMT (0 - 5) RIGHT (1/30/23)   5  HIP FLEXION 5    5 HIP ABDUCTION 5    5 HIP EXTENSION  NT    5 KNEE EXTENSION  5    5 KNEE FLEXION  5      Tool Used: STEADI  Test  1/16/23  1/30/23  3/2/23   TUG 8 seconds  10 sec  11 sec   30s Chair Stand 8 repetitions    9 reps    9 reps   4 Stage Balance Side by side: 10 seconds  Semi-tandem Right fwd: 10 seconds  Tandem Right fwd: 10 seconds  Single leg Right: 3 seconds Side by side: 10 seconds  Semi-tandem Right fwd: 10 seconds  Tandem Right fwd: 10 seconds  Single leg Right: 7 seconds Side by side: 10 seconds  Semi-tandem Left fwd: 10 seconds  Tandem Left fwd: 10 seconds  Single leg Right: 8 seconds   Interpretation of Score: The complete STEADI assessment is a measure of gait, strength, and balance used in conjunction with a fall risk screening to determine a person's risk and need for interventions. TUG: A score of greater than 14 seconds indicates an increased risk for falls.   35s Chair Stand: Scores related to fall risk are adjusted for age and gender; a person is at increased risk of falling if the complete:  Age Men Women   60-64 <14 <12   65-69 <12 <11   70-74 <12 <10   75-79 <11 <10   80-84 <10 <9   85-89 <8 <8   90-94 <7 <4     4 Stage Balance: If a person can hold a position for 10 seconds without moving their feet or needing support, go on to the  next position; if not, STOP the test. A person who cannot hold the tandem stand for at least 10 seconds is at increased risk of falling. TREATMENT PROVIDED TODAY:    PATIENT EDUCATION: PT supervised all exercises utilizing verbal and tactile cues as needed to correct technique. 30684 - MANUAL THERAPY: 8 MIN. PT utilized the following techniques to relieve pain, restore muscle length, restore joint accessory motion to improve PROM, AROM for the restoration of function. STM to the R lateral hip mm about the trochanter. 11312 - THERAPEUTIC EXERCISE: 35 MIN. To address pain and deficits related to ROM and mm function - force production, endurance, neuromuscular coordination for completing ADLs. EXERCISES:  Stationary bike (seat position/intensity/duration): 6/3/4  R hip flexor stretch in standing  R/L front step up: 2/10  R/L side step: 2/10  R/L hip hikin/15  Bridgin/25   R hip abduction:   R clamshell:        Access Code: [email or web address]  URL: [email or web address]  Date: 2022  Prepared by: Stephie Castellanos     Exercises  Supine Heel Slide - 2 x daily - 6 x weekly - 1 sets - 1 - 2 minutes duration:  Supine Bridge - 2 x daily - 6 x weekly - 1 sets - 5 reps  Supine Single Bent Knee Fallout - 2 x daily - 6 x weekly - 1 sets - 10 reps  Supine Hip Abduction on Slider - 2 x daily - 6 x weekly - 1 sets - 10 reps  Standing Hip Abduction AROM - 2 x daily - 6 x weekly - 1 sets - 10 reps  Squat with Counter Support - 2 x daily - 6 x weekly - 1 sets - 10 reps    ASSESSMENT     Today's session focused on relieving thigh pain and increasing hip mobility into extension w IASTM stretching, strength - endurance training of the R hip extensors, hip abductors and the quadriceps. Patient reports/demonstrates the following improvements:  R hip pain diminished session to session. The following impairments are present:  R hip/groin discomfort  Limited R hip ROM - P & A  R knee extension deficit  R hip strength  Balance - static & dynamic => fall risk    Flexed knee gait    These impairments result in the following activity limitations:  Wbing ADLs involving - standing, walking > 10 min, squatting, kneeling, lifting, carrying, negotiating hills or curbs, light or heavy work and recreational activities. Fall risk. PT GOAL(S) ATTAINED:  Patient will voice understanding of symptom limited activity principle. Patient will report maintaining pain ? 4/10 w symptom limited activity, use of modalities of cold and/or heat, positioning, meds and exercise. Patient will report compliance w home program of exercise, cold and elevation. Patient will demonstrate independent skill w initial HEP. Improve HOS score to ? 50% limitation. PT GOAL(S) NOT ATTAINED  2/13/23:  Improve HOS score to ? 20% limitation. 27% limitation. PLAN     Reassess at next session. Include balance training. GOALS      LONG - TERM GOALS TO BE ATTAINED BY 3/10/23:  Improve HOS score to ? 20% limitation. Patient will return to normal sleep pattern re quality and duration. Patient will demonstrate independent skill w maintenance exercise program.  Patient will attain asymptomatic hip function that allows patient to perform ADLs involving FBing, lifting, carrying, reaching and twisting. Patient will tolerate sustained position of sitting for ? 60 min for self - care/dressing/grooming, driving, travel, work, family or social activities. Patient will tolerate sustained position of standing for ? 45 min for self - care/dressing/grooming, preparing meals/cleaning home, work, family or social activities.   Patient will tolerate walking for as necessary for travel, work, family or social activities. Patient will be able to lift from the floor 20 lbs for travel, work, family or social activities. Patient will attain hip function - ROM, mm strength - endurance, coordination of movement - that allows patient to resume desired activities/attain patient goals of: resuming training for the [employer].      Albaro Olvera

## 2023-03-09 ENCOUNTER — OFFICE VISIT (OUTPATIENT)
Age: 88
End: 2023-03-09
Payer: MEDICARE

## 2023-03-09 DIAGNOSIS — R68.89 DECREASED ACTIVITY TOLERANCE: ICD-10-CM

## 2023-03-09 DIAGNOSIS — Z78.9 DECREASED ACTIVITIES OF DAILY LIVING (ADL): ICD-10-CM

## 2023-03-09 DIAGNOSIS — Z74.09 IMPAIRED FUNCTIONAL MOBILITY, BALANCE, GAIT, AND ENDURANCE: ICD-10-CM

## 2023-03-09 DIAGNOSIS — R29.898 IMPAIRED STRENGTH OF HIP MUSCLES: ICD-10-CM

## 2023-03-09 DIAGNOSIS — M25.559 HIP PAIN: Primary | ICD-10-CM

## 2023-03-09 DIAGNOSIS — M25.651 IMPAIRED RANGE OF MOTION OF RIGHT HIP: ICD-10-CM

## 2023-03-09 PROCEDURE — 97110 THERAPEUTIC EXERCISES: CPT | Performed by: PHYSICAL THERAPIST

## 2023-03-09 NOTE — PROGRESS NOTES
[facility]  [address] [facility] [place] [address]  Dept: [phone]    PHYSICAL THERAPY DAILY NOTE     DATE of EVAL: 12/16/22    POC ENDS: 3/10/23    PT SESSION: 20 of 20    DATE of LAST PROGRESS NOTE: 1/16/23    DATE of SURGERY: Fri. 11/18/22  WEEKS POST OP:     TOTAL TIMED PROCEDURE CODES: 39 MIN. TOTAL TIME: 45 MIN. REFERRING PROVIDER: Jatinder Solo MD  DIAGNOSIS:     ICD-10-CM    1. Hip pain  M25.559       2. Impaired range of motion of right hip  M25.651       3. Impaired strength of hip muscles  R29.898       4. Impaired functional mobility, balance, gait, and endurance  Z74.09       5. Decreased activities of daily living (ADL)  Z78.9       6. Decreased activity tolerance  R68.89             THERAPY PRECAUTIONS:None  CO - MORBIDITIES AFFECTING PLAN OF CARE: None    PERTINENT MEDICAL HISTORY     Past Medical History:   Diagnosis Date    Arthritis     mild    Arthropathy, unspecified, site unspecified 7/8/2015    Back pain     Chronic renal insufficiency     pt denies 10/25/22    Diverticulosis of large intestine without diverticulitis 2016    Essential hypertension, benign 7/8/2015    managed with medication    Full dentures     GERD (gastroesophageal reflux disease)     hx-no medication at this time (noted 10/25/22)    Hand pain, left     Hip pain, right     Hx of colonic polyps 2016    adenoma    Osteoarthritis of finger of left hand     Other abnormal glucose 7/8/2015    Other and unspecified hyperlipidemia 7/8/2015    Shoulder pain         HISTORY RELATED TO PRESENT COMPLAINT      CHIEF COMPLAINTS: Stiff R hip. Cant bend over to tie shoes, etc.     HISTORY OF INJURY:   2 - 3 hx of R hip pain w hx of falls prior to MARCELL. Date symptoms began: Fri. 11/18/22  Nel Needle of condition: Recent onset (initial onset within last 3 months)  Primary cause of current episode: Post-surgical  How did symptoms start: MARCELL  Describe current symptoms: Stiff R hip; aching, spasms. Received previous outpatient physical therapy for this problem? Yes; Number of therapy visits in the last 90 days: 8     PAIN ASSESSMENT:  Pain location: R anterior thigh pain    Current (0-10 pain scale): 0/10  Average Pain/symptom intensity (0-10 scale)  Last 24 hours: 2/10  Last week (1-7 days): 2/10  How often do you feel symptoms? Occasionally (26-50%)  Description: aching and spasm  Aggravating factors: Movement related turning LE a certain way  Alleviating factors: Rest, modified activities, Tylenol. NEURO SCREEN: denies numbness, tingling, and radiating pain     SOCIAL/FUNCTIONAL HISTORY:  How would you rate your overall health? very good  Pt lives with independent spouse in a(n) 1 story house with entry steps. 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 All hardware appears to be stable compared to immediate postoperative films. The acetabular component appears to be in good position, no evidence of loosening. Anteversion and inclination angle appear to be within acceptable criteria. The stem appears to be appropriately sized without any evidence of subsidence. No evidence of proximal femoral periprosthetic fracture. Hip is reduced. 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 I am not ready to get back to training for the [employer]. I can sit as I want.   I can stand and do stuff around the house or yard for ~2 hours. OBJECTIVE     FINDINGS:  OBSERVATION:   POSTURE: The pelvis is level, LE alignment is symmetrical in the frontal and sagittal planes. SKIN INTEGRITY: Incision well healed           ATROPHY: R glute and thigh  PALPATION: Tender over the anterior thigh (RF) & posterolateral R hip about the > trochanter. Gait: Independent w/o assistive device. Antalgic. Tends to walk w flexed knees. HOS FUNCTIONAL QUESTIONNAIRE 12/16/22 1/13/23 2/13/23 3/9/23   RAW SCORE 27/68 45/68 44/60 52/68   % FUNCTION 40 66 73 77   % DYSFUNCTION/LIMITATION 60 34 27 23     PAIN RATING (0 - 10) 1/13/23 2/6/23 2/8/23   PRE  ? 1 1 2   POST         LEFT (NON - INVOLVED) RIGHT LQ AROM (?) 1/13/23 1/30/23   13 HIP ER (45°) 12 (+4) 17   13 HIP IR (45°) 19 (+1) 15   101 HIP FLEXION (120°) 93 (+8) 100   18 HIP ABDUCTION (45°) 15 (NC) 16   NT HIP EXTENSION (10 - 30°) NT NT   121 KNEE FLEXION (140°) 115(-7) 137      LEFT (NON - INVOLVED) LQ MMT (0 - 5) RIGHT (1/30/23)   5  HIP FLEXION 5    5 HIP ABDUCTION 5    5 HIP EXTENSION  NT    5 KNEE EXTENSION  5    5 KNEE FLEXION  5      Tool Used: STEADI  Test  1/16/23  1/30/23  3/2/23   TUG 8 seconds  10 sec  11 sec   30s Chair Stand 8 repetitions    9 reps    9 reps   4 Stage Balance Side by side: 10 seconds  Semi-tandem Right fwd: 10 seconds  Tandem Right fwd: 10 seconds  Single leg Right: 3 seconds Side by side: 10 seconds  Semi-tandem Right fwd: 10 seconds  Tandem Right fwd: 10 seconds  Single leg Right: 7 seconds Side by side: 10 seconds  Semi-tandem Left fwd: 10 seconds  Tandem Left fwd: 10 seconds  Single leg Right: 8 seconds   Interpretation of Score: The complete STEADI assessment is a measure of gait, strength, and balance used in conjunction with a fall risk screening to determine a person's risk and need for interventions. TUG: A score of greater than 14 seconds indicates an increased risk for falls.   35s Chair Stand: Scores related to fall risk are adjusted for age and gender; a person is at increased risk of falling if the complete:  Age Men Women   60-64 <14 <12   65-69 <12 <11   70-74 <12 <10   75-79 <11 <10   80-84 <10 <9   85-89 <8 <8   90-94 <7 <4     4 Stage Balance: If a person can hold a position for 10 seconds without moving their feet or needing support, go on to the  next position; if not, STOP the test. A person who cannot hold the tandem stand for at least 10 seconds is at increased risk of falling. TREATMENT PROVIDED TODAY:    PATIENT EDUCATION: PT supervised all exercises utilizing verbal and tactile cues as needed to correct technique. Reviewed and practiced maintenance program.  Exercise illustrations provided by [address] [place] [address]. [id number] - THERAPEUTIC EXERCISE: 45 MIN. To address pain and deficits related to ROM and mm function - force production, endurance, neuromuscular coordination for completing ADLs. EXERCISES:  Stationary bike (seat position/intensity/duration): 6/3/6  R hip flexor stretch in standing  R/L front step up: 2/10  R/L side step: 2/10  R/L hip hikin/15  Door knob squats:   Bridgin/25   R hip abduction: 25  R clamshell: 25   Lateral stepping    Access Code: [email or web address]  URL: [email or web address]  Date: 2023  Prepared by: John Bills    Exercises  Hip Flexor Stretch with Chair - 1 x daily - 6 x weekly - 1 sets - 5 reps - 10 hold  Step Up - 1 x daily - 6 x weekly - 2 sets - 10 - 25 reps  Lateral Step Up - 1 x daily - 6 x weekly - 2 sets - 10 - 25 reps  Hip Hiking on Step - 1 x daily - 6 x weekly - 2 sets - 15 reps  Squat with Counter Support - 1 x daily - 6 x weekly - 2 sets - 10 - 25 reps  Sidestepping - 1 x daily - 6 x weekly - 1 sets - 2 - 10 reps  Supine Bridge - 1 x daily - 6 x weekly - 1 sets - 10 - 25 reps  Supine Extended Bridge on Heels - 1 x daily - 6 x weekly - 1 sets - 10 - 25 reps  Sidelying Hip Abduction - 1 x daily - 6 x weekly - 2 sets - 10 - 25 reps  Clamshell - 1 x daily - 6 x weekly - 2 sets - 10 - 25 reps    ASSESSMENT     20 PT sessions to improve R hip function following MARCELL. Continues to have anterolateral hip pain but reports being stronger and rates self as being ~85% of 'normal'. HOS indicates function @ ~77%. Chief limitation per patient report I still cannot run. Today's session focused on reassessment of function w the HOS f/b review and practice of maintenance program.    Patient reports/demonstrates the following improvements:  Function improved to 77% per HOS. Independent skill w maintenance program.    The following impairments are present:  R hip/groin discomfort  Limited R hip ROM - P & A  R knee extension deficit  R hip strength  Balance - static & dynamic => fall risk    Flexed knee gait    These impairments result in the following activity limitations:  Wbing ADLs involving - standing, walking, squatting, kneeling, lifting, carrying, negotiating hills or curbs, light or heavy work and recreational activities. Fall risk. PT GOAL(S) ATTAINED:  3/9/23:  Patient will return to normal sleep pattern re quality and duration. Patient will demonstrate independent skill w maintenance exercise program.  Patient will attain asymptomatic hip function that allows patient to perform ADLs involving FBing, lifting, carrying, reaching and twisting. Patient will tolerate sustained position of sitting for ? 60 min for self - care/dressing/grooming, driving, travel, work, family or social activities. Patient will tolerate sustained position of standing for ? 45 min for self - care/dressing/grooming, preparing meals/cleaning home, work, family or social activities. Patient will tolerate walking for as necessary for travel, work, family or social activities. PT GOAL(S) NOT ATTAINED  2/13/23:  Improve HOS score to ? 20% limitation. 27% limitation. 3/9/23:  Improve HOS score to ? 20% limitation. 23% limitation.   Patient will be able to lift from the floor 20 lbs for travel, work, family or social activities. Patient will attain hip function - ROM, mm strength - endurance, coordination of movement - that allows patient to resume desired activities/attain patient goals of: resuming training for the [employer].     PLAN     DC to maintenance program.     Jessica Cuellar

## 2023-03-16 ENCOUNTER — OFFICE VISIT (OUTPATIENT)
Dept: ORTHOPEDIC SURGERY | Age: 88
End: 2023-03-16

## 2023-03-16 DIAGNOSIS — Z96.641 STATUS POST RIGHT HIP REPLACEMENT: Primary | ICD-10-CM

## 2023-03-16 NOTE — PROGRESS NOTES
Patient ID:  Johnny Graves  [id number]  57 y.o.  5/17/1934    Today: March 16, 2023       CHIEF COMPLAINT:  Follow-up of right total hip replacement    HISTORY:  The patient presents today for 4 months follow-up after total hip replacement. The patient continues to improve gradually. Patient is continuing to work on strengthening and stretching. The patient has completed the full month of oral DVT prophylaxis. The patient continues to take medication appropriately. Past Medical History:  Past Medical History:   Diagnosis Date    Arthritis     mild    Arthropathy, unspecified, site unspecified 7/8/2015    Back pain     Chronic renal insufficiency     pt denies 10/25/22    Diverticulosis of large intestine without diverticulitis 2016    Essential hypertension, benign 7/8/2015    managed with medication    Full dentures     GERD (gastroesophageal reflux disease)     hx-no medication at this time (noted 10/25/22)    Hand pain, left     Hip pain, right     Hx of colonic polyps 2016    adenoma    Osteoarthritis of finger of left hand     Other abnormal glucose 7/8/2015    Other and unspecified hyperlipidemia 7/8/2015    Shoulder pain        Past Surgical History:  Past Surgical History:   Procedure Laterality Date    COLONOSCOPY  last 3/21/16    Dorie--single 3 mm asc TA--5 year recall if healthy and doing well    TOTAL HIP ARTHROPLASTY Right 11/18/2022    RIGHT HIP TOTAL ARTHROPLASTY  EARL performed by Elonda Cushing, MD at [facility]        Medications:     Prior to Admission medications    Medication Sig Start Date End Date Taking? Authorizing Provider   ondansetron (ZOFRAN) 4 MG tablet Take 4 mg by mouth every 8 hours as needed for Nausea. Elonda Cushing, MD   OXYCODONE ER PO Take 10 mg by mouth every 4 hours as needed (pain).     Elonda Cushing, MD   acetaminophen (TYLENOL) 500 MG tablet Take 2 tablets by mouth every 6 hours as needed for Pain  Patient taking differently: Take 2 tablets by mouth every 6 hours as needed for Pain (breakthrough pain). 22   Fabiola Oquendo MD   aspirin EC 81 MG EC tablet Take 1 tablet by mouth in the morning and at bedtime 22   Fabiola Oquendo MD   omeprazole (PRILOSEC) 40 MG delayed release capsule Take 1 capsule by mouth daily 22   Fabiola Oquendo MD   senna (SENOKOT) 8.6 MG tablet Take 1 tablet by mouth 2 times daily 22   Fabiola Oquendo MD   valsartan (DIOVAN) 160 MG tablet Take 160 mg by mouth daily 22   Historical Provider, MD   amLODIPine (NORVASC) 5 MG tablet Take 5 mg by mouth daily 17   Ar Automatic Reconciliation       Family History:     Family History   Problem Relation Age of Onset    Diabetes Daughter     Heart Disease Father        Social History:      Social History     Tobacco Use    Smoking status: Former     Packs/day: 3.00     Types: Cigarettes     Quit date: 1979     Years since quittin.2    Smokeless tobacco: Never   Substance Use Topics    Alcohol use: Yes     Alcohol/week: 6.0 standard drinks     Comment: social           Allergies:    No Known Allergies       ROS:  Patient Health Form has been filled out, reviewed, signed and included in the chart. ROS findings related to musculoskeletal problems were discussed with the patient. Patient advised to discuss non-orthopedic complaints with primary care physician. PE:  Incision is examined which is healing well. No erythema, induration or drainage. No significant fluid accumulation around the surgical site distally. Distally the patient is able to grossly plantar and dorsiflex foot and ankle. Sensation intact. Limb is perfused. No sign of DVT. X-RAYS:  NONE      ASSESSMENT:  4 months S/P Right TOTAL HIP REPLACEMENT    PLAN:  Continue activity and current weight bearing status. The patient will continue to work on stretching and strengthening of the operative extremity. The patient at this point has no hip precautions.  They are given a refill on their pain medication if deemed appropriate. At this point they may submerge the incision under water and can resume driving if they have not already. The patient has previously been given a script for antibiotics to be taken for dental prophylaxis. I will plan to check them back for 1 year follow-up or sooner if they have any issues, questions or concerns.          Signed By: Vivian Jurado MD  March 16, 2023

## 2024-03-21 ENCOUNTER — OFFICE VISIT (OUTPATIENT)
Dept: ORTHOPEDIC SURGERY | Age: 89
End: 2024-03-21
Payer: MEDICARE

## 2024-03-21 DIAGNOSIS — Z96.641 STATUS POST RIGHT HIP REPLACEMENT: ICD-10-CM

## 2024-03-21 DIAGNOSIS — M25.551 PAIN OF RIGHT HIP: Primary | ICD-10-CM

## 2024-03-21 PROCEDURE — 1123F ACP DISCUSS/DSCN MKR DOCD: CPT | Performed by: ORTHOPAEDIC SURGERY

## 2024-03-21 PROCEDURE — G8421 BMI NOT CALCULATED: HCPCS | Performed by: ORTHOPAEDIC SURGERY

## 2024-03-21 PROCEDURE — G8484 FLU IMMUNIZE NO ADMIN: HCPCS | Performed by: ORTHOPAEDIC SURGERY

## 2024-03-21 PROCEDURE — G8428 CUR MEDS NOT DOCUMENT: HCPCS | Performed by: ORTHOPAEDIC SURGERY

## 2024-03-21 PROCEDURE — 1036F TOBACCO NON-USER: CPT | Performed by: ORTHOPAEDIC SURGERY

## 2024-03-21 PROCEDURE — 99213 OFFICE O/P EST LOW 20 MIN: CPT | Performed by: ORTHOPAEDIC SURGERY

## 2024-03-21 NOTE — PROGRESS NOTES
and at bedtime 22   Mil Mustafa MD   omeprazole (PRILOSEC) 40 MG delayed release capsule Take 1 capsule by mouth daily 22   Mil Mustafa MD   senna (SENOKOT) 8.6 MG tablet Take 1 tablet by mouth 2 times daily 22   Mil Mustafa MD   valsartan (DIOVAN) 160 MG tablet Take 160 mg by mouth daily 22   Provider, MD Wil   amLODIPine (NORVASC) 5 MG tablet Take 5 mg by mouth daily 17   Automatic Reconciliation, Ar       Family History:     Family History   Problem Relation Age of Onset    Diabetes Daughter     Heart Disease Father        Social History:      Social History     Tobacco Use    Smoking status: Former     Current packs/day: 0.00     Types: Cigarettes     Quit date: 1979     Years since quittin.2    Smokeless tobacco: Never   Substance Use Topics    Alcohol use: Yes     Alcohol/week: 6.0 standard drinks of alcohol     Comment: social           Allergies:    No Known Allergies       ROS:  Patient Health Form has been filled out, reviewed, signed and included in the chart.  ROS findings related to musculoskeletal problems were discussed with the patient.  Patient advised to discuss non-orthopedic complaints with primary care physician.    ROS:Patient Health Form has been filled out, reviewed, signed and included in the chart.  ROS findings related to musculoskeletal problems were discussed with the patient.  Patient advised to discuss non-orthopedic complaints with primary care physician.    PE:  Incision is examined which is healing well.  No erythema, induration or drainage.  Distally the patient is able to grossly plantar and dorsiflex foot and ankle.  Sensation intact.  Limb is perfused.  No sign of DVT.    X-RAYS:    XR Pelvis 2/3 Views  Views Obtained: AP Pelvis and Frog leg lateral of right hip  Indication: Postop right Total hip Replacement  Findings:   X-rays are viewed which show total hip replacement in place on the right side.  All hardware

## 2024-08-04 ENCOUNTER — HOSPITAL ENCOUNTER (EMERGENCY)
Age: 89
Discharge: HOME | End: 2024-08-04
Payer: MEDICARE

## 2024-08-04 ENCOUNTER — APPOINTMENT (OUTPATIENT)
Dept: GENERAL RADIOLOGY | Age: 89
End: 2024-08-04
Payer: MEDICARE

## 2024-08-04 VITALS
HEART RATE: 70 BPM | SYSTOLIC BLOOD PRESSURE: 173 MMHG | DIASTOLIC BLOOD PRESSURE: 60 MMHG | TEMPERATURE: 97.8 F | OXYGEN SATURATION: 94 % | RESPIRATION RATE: 18 BRPM

## 2024-08-04 DIAGNOSIS — S68.119A FINGERTIP AMPUTATION, INITIAL ENCOUNTER: Primary | ICD-10-CM

## 2024-08-04 PROCEDURE — 99284 EMERGENCY DEPT VISIT MOD MDM: CPT

## 2024-08-04 PROCEDURE — 6360000002 HC RX W HCPCS: Performed by: PHYSICIAN ASSISTANT

## 2024-08-04 PROCEDURE — 96372 THER/PROPH/DIAG INJ SC/IM: CPT

## 2024-08-04 PROCEDURE — 73130 X-RAY EXAM OF HAND: CPT

## 2024-08-04 PROCEDURE — 90714 TD VACC NO PRESV 7 YRS+ IM: CPT | Performed by: PHYSICIAN ASSISTANT

## 2024-08-04 PROCEDURE — 90471 IMMUNIZATION ADMIN: CPT | Performed by: PHYSICIAN ASSISTANT

## 2024-08-04 PROCEDURE — 2580000003 HC RX 258: Performed by: PHYSICIAN ASSISTANT

## 2024-08-04 RX ORDER — BUPIVACAINE HYDROCHLORIDE 5 MG/ML
30 INJECTION, SOLUTION EPIDURAL; INTRACAUDAL
Status: COMPLETED | OUTPATIENT
Start: 2024-08-04 | End: 2024-08-04

## 2024-08-04 RX ORDER — CEPHALEXIN 500 MG/1
500 CAPSULE ORAL 4 TIMES DAILY
Qty: 28 CAPSULE | Refills: 0 | Status: SHIPPED | OUTPATIENT
Start: 2024-08-04 | End: 2024-08-11

## 2024-08-04 RX ORDER — HYDROCODONE BITARTRATE AND ACETAMINOPHEN 5; 325 MG/1; MG/1
1 TABLET ORAL EVERY 6 HOURS PRN
Qty: 12 TABLET | Refills: 0 | Status: SHIPPED | OUTPATIENT
Start: 2024-08-04 | End: 2024-08-07

## 2024-08-04 RX ADMIN — BUPIVACAINE HYDROCHLORIDE 150 MG: 5 INJECTION, SOLUTION EPIDURAL; INTRACAUDAL at 17:45

## 2024-08-04 RX ADMIN — CLOSTRIDIUM TETANI TOXOID ANTIGEN (FORMALDEHYDE INACTIVATED) AND CORYNEBACTERIUM DIPHTHERIAE TOXOID ANTIGEN (FORMALDEHYDE INACTIVATED) 0.5 ML: 5; 2 INJECTION, SUSPENSION INTRAMUSCULAR at 17:39

## 2024-08-04 RX ADMIN — CEFAZOLIN 1000 MG: 1 INJECTION, POWDER, FOR SOLUTION INTRAMUSCULAR; INTRAVENOUS at 17:41

## 2024-08-04 NOTE — DISCHARGE INSTRUCTIONS
I placed a referral for you to follow-up with the hand surgeon, their information is provided above.  If you do not hear from anybody tomorrow please call the number provided first thing Tuesday morning to schedule close follow-up appointment.  Do not get the dressing wet and leave on until removed by hand surgery.  Continue to wear splint.  I sent a prescription for Norco which is a pain medication that you can take for severe pain every 4-6 hours.  You may not drive or operate heavy machinery on this medication.  Also included a prescription for Keflex which is an antibiotic that you take 4 times daily.  Return immediately to the ER with any severely worsening pain redness spreading up the arm or fevers or concerning symptoms.

## 2024-08-04 NOTE — ED TRIAGE NOTES
Pt arrives to the ER with c/o or L. Hand thumb laceration r/t skill saw accident. Pt denies blood thinner. Pt unsure if up to date on tetanus. Laceration visualized and appear to have no uniformity when distinguishing length and width.

## 2024-08-04 NOTE — ED PROVIDER NOTES
Emergency Department Provider Note       PCP: Demetris Ledezma Jr., MD   Age: 89 y.o.   Sex: male     DISPOSITION Decision To Discharge 08/04/2024 06:48:39 PM       ICD-10-CM    1. Fingertip amputation, initial encounter  S68.119A HYDROcodone-acetaminophen (NORCO) 5-325 MG per tablet     [facility] (Hand Surgery)          Medical Decision Making     Patient is a 89-year-old male who presents with complaint of left thumb laceration.  He was using electric saw and accidentally hit his thumb.  He took off the distal tip of his thumb including distal half of the nail bed.  He does not take any aspirin or blood thinners.  Does not recall his last tetanus shot.  He has partial amputation of the left thumb with macerated edges involving the distal phalanx.  X-rays ordered which confirmed tuft fracture with comminuted fracture from portion of distal phalanx.  Tetanus shot was updated.  He was given 1 g of Ancef IM.  Wound was irrigated thoroughly and anesthetized using 4 cc of 0.5% Marcaine without epinephrine.    At this time to repair as this is more of a partial amputation injury.  Did place Surgicel dressing as well as bulky dressing and splint on finger.  He will be discharged home with [place] for pain and Keflex.  I did place outpatient referral for follow-up with hand surgery for further management.  Discussed reasons to return immediately to the ER.  Patient and family verbalized understanding and are agreeable to plan     1 acute complicated illness or injury.  Over the counter drug management performed.  Prescription drug management performed.  Shared medical decision making was utilized in creating the patients health plan today.    I independently ordered and reviewed each unique test.       I interpreted the X-rays x-rays of the left hand show distal tuft comminuted fracture comminuted fracture of the distal phalanx of the left thumb.    The management of this patient was discussed with an

## 2024-08-06 ENCOUNTER — ANESTHESIA EVENT (OUTPATIENT)
Dept: SURGERY | Age: 89
End: 2024-08-06
Payer: MEDICARE

## 2024-08-06 ENCOUNTER — OFFICE VISIT (OUTPATIENT)
Age: 89
End: 2024-08-06
Payer: MEDICARE

## 2024-08-06 DIAGNOSIS — S68.119A TRAUMATIC AMPUTATION OF FINGER, INITIAL ENCOUNTER: Primary | ICD-10-CM

## 2024-08-06 PROCEDURE — G8421 BMI NOT CALCULATED: HCPCS | Performed by: NURSE PRACTITIONER

## 2024-08-06 PROCEDURE — 1123F ACP DISCUSS/DSCN MKR DOCD: CPT | Performed by: NURSE PRACTITIONER

## 2024-08-06 PROCEDURE — 99214 OFFICE O/P EST MOD 30 MIN: CPT | Performed by: NURSE PRACTITIONER

## 2024-08-06 PROCEDURE — 1036F TOBACCO NON-USER: CPT | Performed by: NURSE PRACTITIONER

## 2024-08-06 PROCEDURE — G8427 DOCREV CUR MEDS BY ELIG CLIN: HCPCS | Performed by: NURSE PRACTITIONER

## 2024-08-06 NOTE — PERIOP NOTE
Patient and pt wife verified name and . Pt request PAT assessment to be completed with his wife. PAT assessment completed on speaker phone with patient and his wife.     Order for consent  found in EHR and matches case posting; patient verifies procedure.     Type 1B surgery, Phone assessment complete.  Orders  received.  Labs per surgeon: none  Labs per anesthesia protocol: none    Patient and patient wife answered medical/surgical history questions at their best of ability. All prior to admission medications documented in EPIC.    Patient and pt wife instructed to continue taking all prescription medications up to the day of surgery but to take only the following medications the day of surgery according to anesthesia guidelines with a small sip of water: Norvasc and keflex.       Patient and pt wife informed that all vitamins and supplements should be held 7 days prior to surgery and NSAIDS 5 days prior to surgery. Prescription meds to hold:no additional.    Patient and pt wife instructed on the following:    > Arrive at [facility] Entrance, time of arrival to be called the day before by 1700  > NPO after midnight, unless otherwise indicated, including gum, mints, and ice chips  > Responsible adult must drive patient to the hospital, stay during surgery, and patient will need supervision 24 hours after anesthesia  > Use non moisturizing soap in shower the night before surgery and on the morning of surgery  > All piercings must be removed prior to arrival.    > Leave all valuables (money and jewelry) at home but bring insurance card and ID on DOS.   > You may be required to pay a deductible or co-pay on the day of your procedure. You can pre-pay by calling [phone] if your surgery is at the [facility] or [phone] if your surgery is at the [facility].  > Do not wear make-up, nail polish, lotions, cologne, perfumes, powders, or oil on skin. Artificial nails are not permitted.

## 2024-08-06 NOTE — PROGRESS NOTES
benefits of surgery were addressed in detail with the patient and his wife.  They understand and wish to proceed.  We will get surgery set up for tomorrow with Dr. Waller.  We discussed postoperative recovery.  We will place a Xeroform dressing over his wound today.    Patient voiced accordance and understanding of the information provided and the formulated plan. All questions were answered to the patient's satisfaction during the encounter.    Treatment at this time:  Surgical intervention    CALE Leger - CNP  Orthopaedic Surgery  08/06/24  8:58 AM

## 2024-08-06 NOTE — PERIOP NOTE
Preop department called to notify patient of arrival time for scheduled procedure. Instructions given to   - Arrive at [facility] Entrance [address]  - Remain NPO after midnight, unless otherwise indicated, including gum, mints, and ice chips.   - Have a responsible adult to drive patient to the hospital, stay during surgery, and patient will need supervision 24 hours after anesthesia.   - Use antibacterial soap in shower the night before surgery and on the morning of surgery.       Was patient contacted: wife  Voicemail left:   Numbers contacted: [phone]   Arrival time: 0830

## 2024-08-07 ENCOUNTER — ANESTHESIA (OUTPATIENT)
Dept: SURGERY | Age: 89
End: 2024-08-07
Payer: MEDICARE

## 2024-08-07 ENCOUNTER — HOSPITAL ENCOUNTER (OUTPATIENT)
Age: 89
Setting detail: OUTPATIENT SURGERY
Discharge: HOME | End: 2024-08-07
Attending: ORTHOPAEDIC SURGERY | Admitting: ORTHOPAEDIC SURGERY
Payer: MEDICARE

## 2024-08-07 VITALS
OXYGEN SATURATION: 94 % | HEIGHT: 69 IN | RESPIRATION RATE: 15 BRPM | HEART RATE: 70 BPM | SYSTOLIC BLOOD PRESSURE: 160 MMHG | WEIGHT: 185 LBS | BODY MASS INDEX: 27.4 KG/M2 | TEMPERATURE: 98.2 F | DIASTOLIC BLOOD PRESSURE: 72 MMHG

## 2024-08-07 PROCEDURE — 3700000001 HC ADD 15 MINUTES (ANESTHESIA): Performed by: ORTHOPAEDIC SURGERY

## 2024-08-07 PROCEDURE — 2709999900 HC NON-CHARGEABLE SUPPLY: Performed by: ORTHOPAEDIC SURGERY

## 2024-08-07 PROCEDURE — 3600000002 HC SURGERY LEVEL 2 BASE: Performed by: ORTHOPAEDIC SURGERY

## 2024-08-07 PROCEDURE — 7100000011 HC PHASE II RECOVERY - ADDTL 15 MIN: Performed by: ORTHOPAEDIC SURGERY

## 2024-08-07 PROCEDURE — 7100000001 HC PACU RECOVERY - ADDTL 15 MIN: Performed by: ORTHOPAEDIC SURGERY

## 2024-08-07 PROCEDURE — 2580000003 HC RX 258: Performed by: NURSE ANESTHETIST, CERTIFIED REGISTERED

## 2024-08-07 PROCEDURE — 6360000002 HC RX W HCPCS: Performed by: ORTHOPAEDIC SURGERY

## 2024-08-07 PROCEDURE — 6370000000 HC RX 637 (ALT 250 FOR IP): Performed by: ANESTHESIOLOGY

## 2024-08-07 PROCEDURE — 3700000000 HC ANESTHESIA ATTENDED CARE: Performed by: ORTHOPAEDIC SURGERY

## 2024-08-07 PROCEDURE — 2580000003 HC RX 258: Performed by: ANESTHESIOLOGY

## 2024-08-07 PROCEDURE — 3600000012 HC SURGERY LEVEL 2 ADDTL 15MIN: Performed by: ORTHOPAEDIC SURGERY

## 2024-08-07 PROCEDURE — 6360000002 HC RX W HCPCS: Performed by: NURSE ANESTHETIST, CERTIFIED REGISTERED

## 2024-08-07 PROCEDURE — 7100000000 HC PACU RECOVERY - FIRST 15 MIN: Performed by: ORTHOPAEDIC SURGERY

## 2024-08-07 PROCEDURE — 7100000010 HC PHASE II RECOVERY - FIRST 15 MIN: Performed by: ORTHOPAEDIC SURGERY

## 2024-08-07 PROCEDURE — 2500000003 HC RX 250 WO HCPCS: Performed by: NURSE ANESTHETIST, CERTIFIED REGISTERED

## 2024-08-07 PROCEDURE — 2500000003 HC RX 250 WO HCPCS: Performed by: ORTHOPAEDIC SURGERY

## 2024-08-07 RX ORDER — ONDANSETRON 2 MG/ML
4 INJECTION INTRAMUSCULAR; INTRAVENOUS
Status: DISCONTINUED | OUTPATIENT
Start: 2024-08-07 | End: 2024-08-07 | Stop reason: HOSPADM

## 2024-08-07 RX ORDER — LIDOCAINE HYDROCHLORIDE 10 MG/ML
INJECTION, SOLUTION INFILTRATION; PERINEURAL PRN
Status: DISCONTINUED | OUTPATIENT
Start: 2024-08-07 | End: 2024-08-07 | Stop reason: ALTCHOICE

## 2024-08-07 RX ORDER — EPHEDRINE SULFATE 5 MG/ML
INJECTION INTRAVENOUS PRN
Status: DISCONTINUED | OUTPATIENT
Start: 2024-08-07 | End: 2024-08-07 | Stop reason: SDUPTHER

## 2024-08-07 RX ORDER — DEXTROSE MONOHYDRATE 100 MG/ML
INJECTION, SOLUTION INTRAVENOUS CONTINUOUS PRN
Status: DISCONTINUED | OUTPATIENT
Start: 2024-08-07 | End: 2024-08-07 | Stop reason: HOSPADM

## 2024-08-07 RX ORDER — HYDROMORPHONE HYDROCHLORIDE 2 MG/ML
0.25 INJECTION, SOLUTION INTRAMUSCULAR; INTRAVENOUS; SUBCUTANEOUS EVERY 5 MIN PRN
Status: DISCONTINUED | OUTPATIENT
Start: 2024-08-07 | End: 2024-08-07 | Stop reason: HOSPADM

## 2024-08-07 RX ORDER — HALOPERIDOL 5 MG/ML
1 INJECTION INTRAMUSCULAR
Status: DISCONTINUED | OUTPATIENT
Start: 2024-08-07 | End: 2024-08-07 | Stop reason: HOSPADM

## 2024-08-07 RX ORDER — BUPIVACAINE HYDROCHLORIDE 2.5 MG/ML
INJECTION, SOLUTION EPIDURAL; INFILTRATION; INTRACAUDAL PRN
Status: DISCONTINUED | OUTPATIENT
Start: 2024-08-07 | End: 2024-08-07 | Stop reason: ALTCHOICE

## 2024-08-07 RX ORDER — SODIUM CHLORIDE 9 MG/ML
INJECTION, SOLUTION INTRAVENOUS PRN
Status: DISCONTINUED | OUTPATIENT
Start: 2024-08-07 | End: 2024-08-07 | Stop reason: HOSPADM

## 2024-08-07 RX ORDER — ONDANSETRON 2 MG/ML
INJECTION INTRAMUSCULAR; INTRAVENOUS PRN
Status: DISCONTINUED | OUTPATIENT
Start: 2024-08-07 | End: 2024-08-07 | Stop reason: SDUPTHER

## 2024-08-07 RX ORDER — MIDAZOLAM HYDROCHLORIDE 2 MG/2ML
2 INJECTION, SOLUTION INTRAMUSCULAR; INTRAVENOUS
Status: DISCONTINUED | OUTPATIENT
Start: 2024-08-07 | End: 2024-08-07 | Stop reason: HOSPADM

## 2024-08-07 RX ORDER — OXYCODONE HYDROCHLORIDE 5 MG/1
5 TABLET ORAL
Status: DISCONTINUED | OUTPATIENT
Start: 2024-08-07 | End: 2024-08-07 | Stop reason: HOSPADM

## 2024-08-07 RX ORDER — LIDOCAINE HYDROCHLORIDE 10 MG/ML
1 INJECTION, SOLUTION INFILTRATION; PERINEURAL
Status: DISCONTINUED | OUTPATIENT
Start: 2024-08-07 | End: 2024-08-07 | Stop reason: HOSPADM

## 2024-08-07 RX ORDER — ACETAMINOPHEN 500 MG
1000 TABLET ORAL ONCE
Status: COMPLETED | OUTPATIENT
Start: 2024-08-07 | End: 2024-08-07

## 2024-08-07 RX ORDER — PROPOFOL 10 MG/ML
INJECTION, EMULSION INTRAVENOUS PRN
Status: DISCONTINUED | OUTPATIENT
Start: 2024-08-07 | End: 2024-08-07 | Stop reason: SDUPTHER

## 2024-08-07 RX ORDER — SODIUM CHLORIDE, SODIUM LACTATE, POTASSIUM CHLORIDE, CALCIUM CHLORIDE 600; 310; 30; 20 MG/100ML; MG/100ML; MG/100ML; MG/100ML
INJECTION, SOLUTION INTRAVENOUS CONTINUOUS
Status: DISCONTINUED | OUTPATIENT
Start: 2024-08-07 | End: 2024-08-07 | Stop reason: HOSPADM

## 2024-08-07 RX ORDER — SODIUM CHLORIDE 0.9 % (FLUSH) 0.9 %
5-40 SYRINGE (ML) INJECTION EVERY 12 HOURS SCHEDULED
Status: DISCONTINUED | OUTPATIENT
Start: 2024-08-07 | End: 2024-08-07 | Stop reason: HOSPADM

## 2024-08-07 RX ORDER — LIDOCAINE HYDROCHLORIDE 20 MG/ML
INJECTION, SOLUTION EPIDURAL; INFILTRATION; INTRACAUDAL; PERINEURAL PRN
Status: DISCONTINUED | OUTPATIENT
Start: 2024-08-07 | End: 2024-08-07 | Stop reason: SDUPTHER

## 2024-08-07 RX ORDER — NALOXONE HYDROCHLORIDE 0.4 MG/ML
INJECTION, SOLUTION INTRAMUSCULAR; INTRAVENOUS; SUBCUTANEOUS PRN
Status: DISCONTINUED | OUTPATIENT
Start: 2024-08-07 | End: 2024-08-07 | Stop reason: HOSPADM

## 2024-08-07 RX ORDER — FENTANYL CITRATE 50 UG/ML
INJECTION, SOLUTION INTRAMUSCULAR; INTRAVENOUS PRN
Status: DISCONTINUED | OUTPATIENT
Start: 2024-08-07 | End: 2024-08-07 | Stop reason: SDUPTHER

## 2024-08-07 RX ORDER — SODIUM CHLORIDE 0.9 % (FLUSH) 0.9 %
5-40 SYRINGE (ML) INJECTION PRN
Status: DISCONTINUED | OUTPATIENT
Start: 2024-08-07 | End: 2024-08-07 | Stop reason: HOSPADM

## 2024-08-07 RX ORDER — IBUPROFEN 600 MG/1
1 TABLET ORAL PRN
Status: DISCONTINUED | OUTPATIENT
Start: 2024-08-07 | End: 2024-08-07 | Stop reason: HOSPADM

## 2024-08-07 RX ADMIN — EPHEDRINE SULFATE 5 MG: 5 INJECTION INTRAVENOUS at 11:21

## 2024-08-07 RX ADMIN — FENTANYL CITRATE 50 MCG: 50 INJECTION, SOLUTION INTRAMUSCULAR; INTRAVENOUS at 11:16

## 2024-08-07 RX ADMIN — SODIUM CHLORIDE, POTASSIUM CHLORIDE, SODIUM LACTATE AND CALCIUM CHLORIDE: 600; 310; 30; 20 INJECTION, SOLUTION INTRAVENOUS at 09:09

## 2024-08-07 RX ADMIN — PROPOFOL 30 MG: 10 INJECTION, EMULSION INTRAVENOUS at 11:16

## 2024-08-07 RX ADMIN — ONDANSETRON 4 MG: 2 INJECTION INTRAMUSCULAR; INTRAVENOUS at 11:21

## 2024-08-07 RX ADMIN — Medication 2000 MG: at 11:18

## 2024-08-07 RX ADMIN — ACETAMINOPHEN 1000 MG: 500 TABLET, FILM COATED ORAL at 09:04

## 2024-08-07 RX ADMIN — PHENYLEPHRINE HYDROCHLORIDE 150 MCG: 10 INJECTION INTRAVENOUS at 11:38

## 2024-08-07 RX ADMIN — LIDOCAINE HYDROCHLORIDE 50 MG: 20 INJECTION, SOLUTION EPIDURAL; INFILTRATION; INTRACAUDAL; PERINEURAL at 11:16

## 2024-08-07 RX ADMIN — PROPOFOL 200 MCG/KG/MIN: 10 INJECTION, EMULSION INTRAVENOUS at 11:17

## 2024-08-07 ASSESSMENT — PAIN SCALES - GENERAL: PAINLEVEL_OUTOF10: 0

## 2024-08-07 NOTE — ANESTHESIA PRE PROCEDURE
Department of Anesthesiology  Preprocedure Note       Name:  Pradip Brady   Age:  89 y.o.  :  1934                                          MRN:  [id number]         Date:  2024      Surgeon: Surgeon(s):  Carly Waller MD    Procedure: Procedure(s):  Left thumb amputation revision    Medications prior to admission:   Prior to Admission medications    Medication Sig Start Date End Date Taking? Authorizing Provider   cephALEXin (KEFLEX) 500 MG capsule Take 1 capsule by mouth 4 times daily for 7 days 24  Katelyn Bravo PA   HYDROcodone-acetaminophen (NORCO) 5-325 MG per tablet Take 1 tablet by mouth every 6 hours as needed for Pain for up to 3 days. Intended supply: 3 days. Take lowest dose possible to manage pain Max Daily Amount: 4 tablets  Patient not taking: Reported on 2024  Katelyn Bravo PA   ondansetron (ZOFRAN) 4 MG tablet Take 4 mg by mouth every 8 hours as needed for Nausea.  Patient not taking: Reported on 2024    Mil Mustafa MD   OXYCODONE ER PO Take 10 mg by mouth every 4 hours as needed (pain).  Patient not taking: Reported on 2024    Mil Mustafa MD   acetaminophen (TYLENOL) 500 MG tablet Take 2 tablets by mouth every 6 hours as needed for Pain  Patient taking differently: Take 2 tablets by mouth every 6 hours as needed for Pain (breakthrough pain) 22   Mil Mustafa MD   aspirin EC 81 MG EC tablet Take 1 tablet by mouth in the morning and at bedtime  Patient not taking: Reported on 22   Mil Mustafa MD   omeprazole (PRILOSEC) 40 MG delayed release capsule Take 1 capsule by mouth daily  Patient not taking: Reported on 22   Mil Mustafa MD   senna (SENOKOT) 8.6 MG tablet Take 1 tablet by mouth 2 times daily  Patient not taking: Reported on 22   Mil Mustafa MD   valsartan (DIOVAN) 160 MG tablet Take 1 tablet by mouth daily 22   Provider, MD Wil

## 2024-08-07 NOTE — DISCHARGE INSTRUCTIONS
Hand Surgery Postoperative  Instructions:      Weightbearing or Lifting:  Limit  weight  lifting  to  less  than  1  pound  (coffee  mug)  for  the  first  2  weeks  after  surgery.      Dressing  instructions:    Keep  your  dressing  and/or  splint  clean  and  dry  at  all  times.    You  can  remove  your  dressing  on  post-operative  day  #5  and  change  with  a  dry/sterile  dressing  or  Band-Aids  as  needed  thereafter.   Alternatively, dressing can remain in place until the first post operative visit.       Showering  Instructions:  May  shower  But keep surgical dressing clean and dry until removed as explained above. After dressing is removed, you may allow soapy water to run through the incision during showers but do not scrub. After each shower, pat dry and apply a dry dressing. Do  not  soak  your  Incision in still water or bathtub  for  3  weeks  after  surgery.    If  the  incision  gets  wet otherwise,  pat  dry  and  do  not  scrub  the  incision.  Do  not  apply  cream, ointment  or  lotion  to  incision      Pain  Control:  - You  have  been  given  a  prescription  to  be  taken  as  directed  for  post-operative  pain  control.    In  addition,  elevate  the  operative  extremity  above  the  heart  at  all  times  to  prevent  swelling  and  throbbing  pain.   - If you develop constipation while taking narcotic pain medications (Norco, Hydrocodone, Percocet, Oxycodone, Dilaudid, Hydromorphone) take  over-the-counter  Colace,  100mg  by  mouth  twice  a  Day.     - Nausea  is  a  common  side  effect  of  many  pain  medications.  You  will  want  to  eat something  before  taking  your  pain  medicine  to  help  prevent  Nausea.  - If  you  are  taking  a  prescription  pain  medication  that  contains  acetaminophen,  we  recommend  that  you  do  not  take  additional  over  the  counter  acetaminophen  (Tylenol®).      Other  pain  relieving  options:   - Using  a  cold  pack  to

## 2024-08-08 NOTE — OP NOTE
ORTHOPAEDIC SURGICAL NOTE        Pradip Brady male 89 y.o.  [id number]   08/08/24    PRE-OP DIAGNOSIS: partial amputation of left thumb distal phalanx  POST-OP DIAGNOSIS: Same  LATERALITY: Left     PROCEDURES PERFORMED:   Revision amputation of left thumb, with direct wound closure   Nailbed ablation left thumb       SURGEON:   Carly Waller MD     IMPLANTS:   * No implants in log *   Procedure(s):  Left thumb amputation revision   Surgeon(s):  Carly Waller MD   Procedure(s) with comments:  Left thumb amputation revision - with local     ANESTHESIA: Monitor Anesthesia Care     STAFF:    Circulator: Arnav Pemberton RN  Scrub Person First: Ruth Raman     ESTIMATED BLOOD LOSS: Minimal       TOTAL IV FLUIDS : See anesthesia note    COMPLICATIONS: None     TOURNIQUET TIME:   Total Tourniquet Time Documented:  Arm  (Left) - 9 minutes  Total: Arm  (Left) - 9 minutes       INDICATION FOR PROCEDURE:     Pradip Brady sustained a complex wound of the left thumb as a result of a table saw injury. He has an open distal tuft fracture with partial amputation of the finger tip.  Surgical and non-surgical treatment options were discussed with the patient and their family, as well as the risk and benefits of each option. After thorough discussion, the patient decided to proceed with surgical management.  Specific to this treatment plan, we discussed in detail surgical risks including scar, pain, bleeding, infection, anesthetic risks, neurovascular injury, failure to achieve desired results, hardware problems, need for further surgery,  weakness, stiffness, risk of death and potential risk of other unforseen complication.       The patient consented to the procedure after discussion of the risks and benefits.         DESCRIPTION OF PROCEDURE:     The patient was identified in the holding room. The left thumb was marked and confirmed as the correct operative site. They were then brought to the OR and transferred onto the

## 2024-08-19 ENCOUNTER — OFFICE VISIT (OUTPATIENT)
Age: 89
End: 2024-08-19

## 2024-08-19 DIAGNOSIS — S68.119A TRAUMATIC AMPUTATION OF FINGER, INITIAL ENCOUNTER: Primary | ICD-10-CM

## 2024-08-19 PROCEDURE — 99024 POSTOP FOLLOW-UP VISIT: CPT | Performed by: ORTHOPAEDIC SURGERY

## 2024-08-19 NOTE — PROGRESS NOTES
Orthopaedic Hand Surgery Note    Name: Pradip Brady  YOB: 1934  Gender: male  MRN: [id number]    Post Operative Visit: Left thumb amputation revision - Left    HPI: Patient is status post Left thumb amputation revision - Left on 8/7/2024. Patient reports well controlled pain.    Physical Examination:  Surgical incision is clean, dry and intact.  Sutures are in place.  There is no erythema or drainage. Sensation is intact in all fingers. Motor exam reveals no deficits. Able to fire EPL and FPL.    Imaging:     none    Assessment:   1. Traumatic amputation of finger, initial encounter         Status post Left thumb amputation revision - Left on 8/7/2024    Plan:  We discussed the post operative course and progression.  Sutures removed today.  Patient's incision has healed very well.  Provided him with silicone finger pads to use as needed for comfort. I offered a referral to OT which he declined. He will follow up as needed        Carly Waller MD  08/19/24  1:31 PM

## (undated) DEVICE — SYSTEM SKIN CLSR 22CM DERMBND PRINEO

## (undated) DEVICE — ZIMMER® STERILE DISPOSABLE TOURNIQUET CUFF WITH PLC, DUAL PORT, SINGLE BLADDER, 18 IN. (46 CM)

## (undated) DEVICE — HAND PACK: Brand: MEDLINE INDUSTRIES, INC.

## (undated) DEVICE — GOWN,REINFORCED,POLY,AURORA,XXLARGE,STR: Brand: MEDLINE

## (undated) DEVICE — GLOVE SURG SZ 65 L12IN FNGR THK79MIL GRN LTX FREE

## (undated) DEVICE — BANDAGE COMPR W1INXL5YD FOAM COHESIVE QUIK STK SELF ADH SFT

## (undated) DEVICE — PADDING CAST W3INXL4YD COT BLEND MIC PLEAT UNDERCAST SPEC

## (undated) DEVICE — STERILE SYNTHETIC POLYISOPRENE POWDER-FREE SURGICAL GLOVES WITH HYDROGEL COATING, SMOOTH FINISH, STRAIGHT FINGER: Brand: PROTEXIS

## (undated) DEVICE — DRAPE,TOP,102X53,STERILE: Brand: MEDLINE

## (undated) DEVICE — SUTURE VICRYL RAPIDE SZ 4-0 L18IN ABSRB UD PS-3 L13MM 3/8 CIR VR494

## (undated) DEVICE — SUTURE STRATAFIX SZ 3-0 L30CM NONABSORBABLE UD L19MM FS-2 SXMP2B408

## (undated) DEVICE — DRESSING,GAUZE,XEROFORM,CURAD,1"X8",ST: Brand: CURAD

## (undated) DEVICE — DRAPE,U/SHT,SPLIT,FILM,60X84,STERILE: Brand: MEDLINE

## (undated) DEVICE — 450 ML BOTTLE OF 0.05% CHLORHEXIDINE GLUCONATE IN 99.95% STERILE WATER FOR IRRIGATION, USP AND APPLICATOR.: Brand: IRRISEPT ANTIMICROBIAL WOUND LAVAGE

## (undated) DEVICE — SUTURE FIBERWIRE SZ 2 W/ TAPERED NEEDLE BLUE L38IN NONABSORB BLU L26.5MM 1/2 CIRCLE AR7200

## (undated) DEVICE — SUTURE MCRYL SZ 2-0 L27IN ABSRB UD CP-1 1 L36MM 1/2 CIR REV Y266H

## (undated) DEVICE — GLOVE SURG SZ 65 THK91MIL LTX FREE SYN POLYISOPRENE

## (undated) DEVICE — SOLUTION IV 250ML 0.9% SOD CHL PH 5 INJ USP VIAFLX PLAS

## (undated) DEVICE — TOTAL HIP DR WATSON: Brand: MEDLINE INDUSTRIES, INC.

## (undated) DEVICE — STERILE PVP: Brand: MEDLINE INDUSTRIES, INC.

## (undated) DEVICE — SPONGE GZ W4XL4IN COT 12 PLY TYP VII WVN C FLD DSGN

## (undated) DEVICE — SYRINGE IRRIG 60ML SFT PLIABLE BLB EZ TO GRP 1 HND USE W/

## (undated) DEVICE — YANKAUER,FLEXIBLE HANDLE,REGLR CAPACITY: Brand: MEDLINE INDUSTRIES, INC.

## (undated) DEVICE — SOLUTION IRRIG 3000ML 0.9% SOD CHL USP UROMATIC PLAS CONT

## (undated) DEVICE — BNDG,ELSTC,MATRIX,STRL,2"X5YD,LF,HOOK&LP: Brand: MEDLINE

## (undated) DEVICE — SUTURE STRATAFIX SPRL SZ 1 L14IN ABSRB VLT L48CM CTX 1/2 SXPD2B405

## (undated) DEVICE — BANDAGE GZ W2XL75IN ST RAYON POLY CNFRM STRTCH LTWT

## (undated) DEVICE — DISPOSABLE BIPOLAR CODE, 12' (3.66 M): Brand: CONMED

## (undated) DEVICE — HOOD: Brand: FLYTE

## (undated) DEVICE — CARDINAL HEALTH FLEXIBLE LIGHT HANDLE COVER: Brand: CARDINAL HEALTH

## (undated) DEVICE — PIN FIX TROCAR PT 1 END 9/64X9 IN 1 PT STYL SMOOTH PLN STRL
Type: IMPLANTABLE DEVICE | Site: HIP | Status: NON-FUNCTIONAL
Removed: 2022-11-18

## (undated) DEVICE — SOLUTION IRRIG 1000ML 0.9% SOD CHL USP POUR PLAS BTL

## (undated) DEVICE — BIPOLAR SEALER 23-112-1 AQM 6.0: Brand: AQUAMANTYS ®